# Patient Record
Sex: FEMALE | Race: WHITE | NOT HISPANIC OR LATINO | Employment: OTHER | URBAN - METROPOLITAN AREA
[De-identification: names, ages, dates, MRNs, and addresses within clinical notes are randomized per-mention and may not be internally consistent; named-entity substitution may affect disease eponyms.]

---

## 2017-01-04 PROBLEM — R42 DIZZINESS: Status: ACTIVE | Noted: 2017-01-04

## 2017-01-13 PROBLEM — K21.9 GASTROESOPHAGEAL REFLUX DISEASE WITHOUT ESOPHAGITIS: Status: ACTIVE | Noted: 2017-01-13

## 2017-03-03 ENCOUNTER — OFFICE VISIT (OUTPATIENT)
Dept: PAIN MEDICINE | Facility: CLINIC | Age: 72
End: 2017-03-03
Payer: MEDICARE

## 2017-03-03 VITALS — DIASTOLIC BLOOD PRESSURE: 71 MMHG | HEIGHT: 61 IN | SYSTOLIC BLOOD PRESSURE: 147 MMHG | HEART RATE: 63 BPM

## 2017-03-03 DIAGNOSIS — M51.36 DDD (DEGENERATIVE DISC DISEASE), LUMBAR: ICD-10-CM

## 2017-03-03 DIAGNOSIS — M51.36 DDD (DEGENERATIVE DISC DISEASE), LUMBAR: Primary | ICD-10-CM

## 2017-03-03 PROCEDURE — 99213 OFFICE O/P EST LOW 20 MIN: CPT | Mod: PBBFAC,PO | Performed by: PHYSICIAN ASSISTANT

## 2017-03-03 PROCEDURE — 99999 PR PBB SHADOW E&M-EST. PATIENT-LVL III: CPT | Mod: PBBFAC,,, | Performed by: PHYSICIAN ASSISTANT

## 2017-03-03 PROCEDURE — 99214 OFFICE O/P EST MOD 30 MIN: CPT | Mod: S$PBB,,, | Performed by: PHYSICIAN ASSISTANT

## 2017-03-03 RX ORDER — TRAMADOL HYDROCHLORIDE 50 MG/1
50 TABLET ORAL EVERY 8 HOURS PRN
Qty: 60 TABLET | Refills: 2 | Status: SHIPPED | OUTPATIENT
Start: 2017-03-28 | End: 2017-05-31 | Stop reason: SDUPTHER

## 2017-03-03 NOTE — MR AVS SNAPSHOT
[facility]  [address] [place] [address]  [address]  [place] [address]  Phone: [phone]                  Suki Macias   3/3/2017 11:00 AM   Office Visit    Description:  Female : 1945   Provider:  Reyna Becker PA-C   Department:  [facility]           Reason for Visit     Follow-up           Diagnoses this Visit        Comments    DDD (degenerative disc disease), lumbar    -  Primary            To Do List           Future Appointments        Provider Department Dept Phone    2017 11:00 AM MD [facility] [phone]      Goals (5 Years of Data)     None      [facility]     [facility] On [facility] Care Line -  Assistance  Registered nurses in the [facility] Center provide clinical advisement, health education, appointment booking, and other advisory services.  Call for this free service at [phone].             Medications           Message regarding Medications     Verify the changes and/or additions to your medication regime listed below are the same as discussed with your clinician today.  If any of these changes or additions are incorrect, please notify your healthcare provider.             Verify that the below list of medications is an accurate representation of the medications you are currently taking.  If none reported, the list may be blank. If incorrect, please contact your healthcare provider. Carry this list with you in case of emergency.           Current Medications     aspirin (ECOTRIN) 81 MG EC tablet Take 81 mg by mouth once daily.    celecoxib (CELEBREX) 200 MG capsule Take 1 capsule (200 mg total) by mouth 2 (two) times daily as needed for Pain.    estrogens, conjugated, (PREMARIN) 0.9 MG Tab Take 0.9 mg by mouth once daily.    fenofibric acid (FIBRICOR) 135 mg CpDR Take 1 capsule (135 mg total) by mouth once daily.    levothyroxine (SYNTHROID) 125 MCG tablet TAKE 1 TABLET EVERY MORNING ON AN EMPTY  "STOMACH FOR THYROID    lisinopril (PRINIVIL,ZESTRIL) 2.5 MG tablet Take 2.5 mg by mouth once daily.    omeprazole (PRILOSEC) 40 MG capsule Take 1 capsule (40 mg total) by mouth once daily.    pitavastatin (LIVALO) 2 mg Tab tablet Take 1 tablet (2 mg total) by mouth once daily.    tramadol (ULTRAM) 50 mg tablet Starting on Mar 28, 2017. Take 1 tablet (50 mg total) by mouth every 8 (eight) hours as needed for Pain.    azelastine (ASTELIN) 137 mcg (0.1 %) nasal spray 1 spray (137 mcg total) by Nasal route 2 (two) times daily.           Clinical Reference Information           Your Vitals Were     BP Pulse Height             147/71 (BP Location: Right arm, Patient Position: Sitting, BP Method: Automatic) 63 5' 1" (1.549 m)         Blood Pressure          Most Recent Value    BP  (!)  147/71      Allergies as of 3/3/2017     Codeine    Crestor [Rosuvastatin]    Lipitor [Atorvastatin]      Immunizations Administered on Date of Encounter - 3/3/2017     None      [facility] Sign-Up     Activating your [facility] account is as easy as 1-2-3!     1) Visit [email or web address], select Sign Up Now, enter this activation code and your date of birth, then select Next.  [email or web address]  Expires: 4/17/2017 11:02 AM      2) Create a username and password to use when you visit [facility] in the future and select a security question in case you lose your password and select Next.    3) Enter your e-mail address and click Sign Up!    Additional Information  If you have questions, please e-mail [email or web address] or call [phone] to talk to our [facility] staff. Remember, [facility] is NOT to be used for urgent needs. For medical emergencies, dial 911.         Language Assistance Services     ATTENTION: Language assistance services are available, free of charge. Please call [phone].      ATENCIÓN: Si habla español, tiene a ramesh disposición servicios gratuitos de asistencia lingüística. Llame al [phone].     ROGERS Ý: N?u b?n nói " Ti?ng Vi?t, có các d?ch v? h? tr? ngôn ng? mi?n phí dành cho b?n. G?i s? [phone].         [facility] complies with applicable Federal civil rights laws and does not discriminate on the basis of race, color, national origin, age, disability, or sex.

## 2017-03-03 NOTE — PROGRESS NOTES
Referring Physician: No ref. provider found    PCP: Sera Rodriguez III, MD      CC: Lower back pain    Interval History:  Ms. Macias is a 72 y.o. female with chronic low back pain who presents today for f/u and medication refills. Pain is unchanged in quality or location. Tramadol 50 mg q 12 h and Celebrex provides moderate benefit. She is able to perform her ADLs and play golf. She is happy with her pain control and does not desire any interventions. Pain today is rated 0/10.  Pt has been seen in the clinic before, however pt is new to me.     History below per Dr. Starr    HPI:   She is a 71-year-old female referred to us for lower back pain.  Is been present for over 10 years.  She states having constant aching pain in her lower back.  Pain radiates to her right hip at times.  Pain worsens with standing, walking and getting up.  She's had moderate benefit from physical therapy in the past.  She's also had lumbar BLAKE's in the past with moderate benefit.  Pain is currently tolerable with medications.  She takes tramadol 50 mg every 12 hours as needed.  She is able to perform her ADLs with the medication.  She also takes Celebrex with mild benefits.  Tramadol was provided by her PCP until recently.  She states her current PCP is unwilling to continue her tramadol even though he has prescribed to her for over 2 years.  She denies any weakness.  No bowel bladder changes.  She rates her pain 1/10 today.    ROS:  CONSTITUTIONAL: No fevers, chills, night sweats, wt. loss, appetite changes  SKIN: no rashes or itching  ENT: No headaches, head trauma, vision changes, or eye pain  LYMPH NODES: None noticed   CV: No chest pain, palpitations.   RESP: No shortness of breath, dyspnea on exertion, cough, wheezing, or hemoptysis  GI: No nausea, emesis, diarrhea, constipation, melena, hematochezia, pain.    : No dysuria, hematuria, urgency, or frequency   HEME: No easy bruising, bleeding problems  PSYCHIATRIC: No depression, anxiety,  "psychosis, hallucinations.  NEURO: No seizures, memory loss, dizziness or difficulty sleeping  MSK: + History of present illness      Past Medical History:   Diagnosis Date    Hypertension     Hypothyroid     Hypothyroidism, postablative      Past Surgical History:   Procedure Laterality Date    BACK SURGERY  1973    BLADDER SUSPENSION  1987    COLONOSCOPY  2011    procedure aborted. unable to complete    HYSTERECTOMY  1985    bso     Family History   Problem Relation Age of Onset    Colon cancer Mother     Lung cancer Father     Breast cancer Sister     Lung cancer Brother     Heart failure Brother     Stroke Brother      Social History     Social History    Marital status: Unknown     Spouse name: N/A    Number of children: N/A    Years of education: N/A     Social History Main Topics    Smoking status: Former Smoker     Quit date: 1/1/1970    Smokeless tobacco: None    Alcohol use None    Drug use: None    Sexual activity: Not Asked     Other Topics Concern    None     Social History Narrative         Medications/Allergies: See med card    Vitals:    03/03/17 1113   BP: (!) 147/71   Pulse: 63   Height: 5' 1" (1.549 m)   PainSc: 0-No pain         Physical exam:    GENERAL: A and O x3, the patient appears well groomed and is in no acute distress.  Skin: No rashes or obvious lesions  HEENT: normocephalic, atraumatic  CARDIOVASCULAR:  RRR  LUNGS: non labored breathing  ABDOMEN: soft, nontender   UPPER EXTREMITIES: Normal alignment, normal range of motion, no atrophy, no skin changes,  hair growth and nail growth normal and equal bilaterally. No swelling, no tenderness.    LOWER EXTREMITIES:  Normal alignment, normal range of motion, no atrophy, no skin changes,  hair growth and nail growth normal and equal bilaterally. No swelling, no tenderness.    LUMBAR SPINE  Lumbar spine: ROM is full with flexion extension and oblique extension with mild increased pain.    Andry's test causes no increased " pain on either side.    Supine straight leg raise is negative bilaterally.    Internal and external rotation of the hip causes no increased pain on either side.  Myofascial exam: No tenderness to palpation across lumbar paraspinous muscles.      MENTAL STATUS: normal orientation, speech, language, and fund of knowledge for social situation.  Emotional state appropriate.    CRANIAL NERVES:  II:  PERRL bilaterally,   III,IV,VI: EOMI.    V:  Facial sensation equal bilaterally  VII:  Facial motor function normal.  VIII:  Hearing equal to finger rub bilaterally  IX/X: Gag normal, palate symmetric  XI:  Shoulder shrug equal, head turn equal  XII:  Tongue midline without fasciculations      MOTOR: Tone and bulk: normal bilateral upper and lower Strength: normal   Delt Bi Tri WE WF     R 5 5 5 5 5 5   L 5 5 5 5 5 5     IP ADD ABD Quad TA Gas HAM  R 5 5 5 5 5 5 5  L 5 5 5 5 5 5 5    SENSATION: Light touch and pinprick intact bilaterally  REFLEXES: normal, symmetric, nonbrisk.  Toes down, no clonus. No hoffmans.  GAIT: normal rise, base, steps, and arm swing.        Imaging:  None available    Assessment:  Ms. Macias is a 72 y.o. female with low back pain  1. DDD (degenerative disc disease), lumbar        Plan:  1. I have stressed the importance of physical activity and exercise to improve overall health  2. Tramadol 50 mg twice a day as needed.  This is very reasonable.   reviewed.  No aberrant behavior.   3. She has had moderate benefit from lumbar BLAKE's in the past. Pain is currently tolerable. She does not desire interventions currently. May consider repeat procedure in future  4. F/u 3 months or sooner  All medication management was performed by Dr. Kashif Starr

## 2017-03-27 PROBLEM — R19.7 DIARRHEA: Status: ACTIVE | Noted: 2017-03-27

## 2017-05-31 DIAGNOSIS — M51.36 DDD (DEGENERATIVE DISC DISEASE), LUMBAR: ICD-10-CM

## 2017-05-31 RX ORDER — TRAMADOL HYDROCHLORIDE 50 MG/1
50 TABLET ORAL EVERY 8 HOURS PRN
Qty: 60 TABLET | Refills: 2 | Status: SHIPPED | OUTPATIENT
Start: 2017-06-05 | End: 2017-07-04

## 2017-06-05 ENCOUNTER — OFFICE VISIT (OUTPATIENT)
Dept: PAIN MEDICINE | Facility: CLINIC | Age: 72
End: 2017-06-05
Payer: MEDICARE

## 2017-06-05 VITALS
HEART RATE: 64 BPM | DIASTOLIC BLOOD PRESSURE: 69 MMHG | WEIGHT: 107 LBS | HEIGHT: 61 IN | SYSTOLIC BLOOD PRESSURE: 144 MMHG | BODY MASS INDEX: 20.2 KG/M2

## 2017-06-05 DIAGNOSIS — M51.36 DDD (DEGENERATIVE DISC DISEASE), LUMBAR: Primary | ICD-10-CM

## 2017-06-05 PROCEDURE — 99214 OFFICE O/P EST MOD 30 MIN: CPT | Mod: S$PBB,,, | Performed by: PHYSICIAN ASSISTANT

## 2017-06-05 PROCEDURE — 99999 PR PBB SHADOW E&M-EST. PATIENT-LVL III: CPT | Mod: PBBFAC,,, | Performed by: PHYSICIAN ASSISTANT

## 2017-06-05 PROCEDURE — 99213 OFFICE O/P EST LOW 20 MIN: CPT | Mod: PBBFAC,PO | Performed by: PHYSICIAN ASSISTANT

## 2017-06-05 PROCEDURE — 1125F AMNT PAIN NOTED PAIN PRSNT: CPT | Mod: ,,, | Performed by: PHYSICIAN ASSISTANT

## 2017-06-05 PROCEDURE — 1159F MED LIST DOCD IN RCRD: CPT | Mod: ,,, | Performed by: PHYSICIAN ASSISTANT

## 2017-06-05 RX ORDER — LEVOTHYROXINE SODIUM 125 UG/1
TABLET ORAL
COMMUNITY
Start: 2017-02-27 | End: 2017-08-31 | Stop reason: DRUGHIGH

## 2017-06-05 NOTE — PROGRESS NOTES
Referring Physician: No ref. provider found    PCP: Sera Rodriguez III, MD      CC: Lower back pain    Interval History:  Ms. Macias is a 72 y.o. female with chronic low back pain who presents today for f/u and medication refills. Pain is unchanged in quality or location. Tramadol 50 mg q 12 h and Celebrex provides moderate benefit. She is able to perform her ADLs and play golf. She is happy with her pain control and does not desire any interventions. Pain today is rated 4/10.    HPI:   She is a 71-year-old female referred to us for lower back pain.  Is been present for over 10 years.  She states having constant aching pain in her lower back.  Pain radiates to her right hip at times.  Pain worsens with standing, walking and getting up.  She's had moderate benefit from physical therapy in the past.  She's also had lumbar BLAKE's in the past with moderate benefit.  Pain is currently tolerable with medications.  She takes tramadol 50 mg every 12 hours as needed.  She is able to perform her ADLs with the medication.  She also takes Celebrex with mild benefits.  Tramadol was provided by her PCP until recently.  She states her current PCP is unwilling to continue her tramadol even though he has prescribed to her for over 2 years.  She denies any weakness.  No bowel bladder changes.  She rates her pain 1/10 today.    ROS:  CONSTITUTIONAL: No fevers, chills, night sweats, wt. loss, appetite changes  SKIN: no rashes or itching  ENT: No headaches, head trauma, vision changes, or eye pain  LYMPH NODES: None noticed   CV: No chest pain, palpitations.   RESP: No shortness of breath, dyspnea on exertion, cough, wheezing, or hemoptysis  GI: No nausea, emesis, diarrhea, constipation, melena, hematochezia, pain.    : No dysuria, hematuria, urgency, or frequency   HEME: No easy bruising, bleeding problems  PSYCHIATRIC: No depression, anxiety, psychosis, hallucinations.  NEURO: No seizures, memory loss, dizziness or difficulty  "sleeping  MSK: + History of present illness      Past Medical History:   Diagnosis Date    Hypertension     Hypothyroid     Hypothyroidism, postablative      Past Surgical History:   Procedure Laterality Date    BACK SURGERY  1973    BLADDER SUSPENSION  1987    COLONOSCOPY  2011    procedure aborted. unable to complete    HYSTERECTOMY  1985    bso     Family History   Problem Relation Age of Onset    Colon cancer Mother     Lung cancer Father     Breast cancer Sister     Lung cancer Brother     Heart failure Brother     Stroke Brother      Social History     Social History    Marital status: Unknown     Spouse name: N/A    Number of children: N/A    Years of education: N/A     Social History Main Topics    Smoking status: Former Smoker     Quit date: 1/1/1970    Smokeless tobacco: None    Alcohol use None    Drug use: Unknown    Sexual activity: Not Asked     Other Topics Concern    None     Social History Narrative    None         Medications/Allergies: See med card    Vitals:    06/05/17 1011   BP: (!) 144/69   Pulse: 64   Weight: 48.5 kg (107 lb)   Height: 5' 1" (1.549 m)   PainSc:   4   PainLoc: Back         Physical exam:    GENERAL: A and O x3, the patient appears well groomed and is in no acute distress.  Skin: No rashes or obvious lesions  HEENT: normocephalic, atraumatic  CARDIOVASCULAR:  RRR  LUNGS: non labored breathing  ABDOMEN: soft, nontender   UPPER EXTREMITIES: Normal alignment, normal range of motion, no atrophy, no skin changes,  hair growth and nail growth normal and equal bilaterally. No swelling, no tenderness.    LOWER EXTREMITIES:  Normal alignment, normal range of motion, no atrophy, no skin changes,  hair growth and nail growth normal and equal bilaterally. No swelling, no tenderness.    LUMBAR SPINE  Lumbar spine: ROM is full with flexion extension and oblique extension with mild increased pain.    Andry's test causes no increased pain on either side.    Supine " straight leg raise is negative bilaterally.    Internal and external rotation of the hip causes no increased pain on either side.  Myofascial exam: No tenderness to palpation across lumbar paraspinous muscles.      MENTAL STATUS: normal orientation, speech, language, and fund of knowledge for social situation.  Emotional state appropriate.    CRANIAL NERVES:  II:  PERRL bilaterally,   III,IV,VI: EOMI.    V:  Facial sensation equal bilaterally  VII:  Facial motor function normal.  VIII:  Hearing equal to finger rub bilaterally  IX/X: Gag normal, palate symmetric  XI:  Shoulder shrug equal, head turn equal  XII:  Tongue midline without fasciculations      MOTOR: Tone and bulk: normal bilateral upper and lower Strength: normal   Delt Bi Tri WE WF     R 5 5 5 5 5 5   L 5 5 5 5 5 5     IP ADD ABD Quad TA Gas HAM  R 5 5 5 5 5 5 5  L 5 5 5 5 5 5 5    SENSATION: Light touch and pinprick intact bilaterally  REFLEXES: normal, symmetric, nonbrisk.  Toes down, no clonus. No hoffmans.  GAIT: normal rise, base, steps, and arm swing.        Imaging:  None available    Assessment:  Ms. Macias is a 72 y.o. female with low back pain  1. DDD (degenerative disc disease), lumbar        Plan:  1. I have stressed the importance of physical activity and exercise to improve overall health  2. Tramadol 50 mg twice a day as needed.  2 refills.   reviewed.  No aberrant behavior.   3. She has had moderate benefit from lumbar BLAKE's in the past. Pain is currently tolerable. She does not desire interventions currently. May consider repeat procedure in future  4.  Follow up with PCP regarding elevated BP  5. F/u 3 months or sooner  All medication management was performed by Dr. Kashif Starr

## 2017-07-10 PROBLEM — R09.82 POSTNASAL DRIP: Status: ACTIVE | Noted: 2017-07-10

## 2017-07-10 PROBLEM — R05.9 COUGH: Status: ACTIVE | Noted: 2017-07-10

## 2017-07-10 PROBLEM — R06.7 SNEEZING: Status: ACTIVE | Noted: 2017-07-10

## 2017-07-10 PROBLEM — J34.89 RHINORRHEA: Status: ACTIVE | Noted: 2017-07-10

## 2017-07-10 PROBLEM — J02.9 SORE THROAT: Status: ACTIVE | Noted: 2017-07-10

## 2017-10-02 DIAGNOSIS — M51.36 DDD (DEGENERATIVE DISC DISEASE), LUMBAR: ICD-10-CM

## 2017-10-02 RX ORDER — TRAMADOL HYDROCHLORIDE 50 MG/1
50 TABLET ORAL EVERY 8 HOURS PRN
Qty: 60 TABLET | Refills: 2 | Status: CANCELLED | OUTPATIENT
Start: 2017-10-02 | End: 2017-10-31

## 2017-10-09 ENCOUNTER — OFFICE VISIT (OUTPATIENT)
Dept: PAIN MEDICINE | Facility: CLINIC | Age: 72
End: 2017-10-09
Payer: MEDICARE

## 2017-10-09 VITALS
HEIGHT: 61 IN | WEIGHT: 107 LBS | DIASTOLIC BLOOD PRESSURE: 74 MMHG | SYSTOLIC BLOOD PRESSURE: 171 MMHG | HEART RATE: 57 BPM | BODY MASS INDEX: 20.2 KG/M2

## 2017-10-09 DIAGNOSIS — M51.36 DDD (DEGENERATIVE DISC DISEASE), LUMBAR: Primary | ICD-10-CM

## 2017-10-09 DIAGNOSIS — M47.896 OTHER SPONDYLOSIS, LUMBAR REGION: ICD-10-CM

## 2017-10-09 PROCEDURE — 99213 OFFICE O/P EST LOW 20 MIN: CPT | Mod: PBBFAC,PO | Performed by: PHYSICIAN ASSISTANT

## 2017-10-09 PROCEDURE — 99214 OFFICE O/P EST MOD 30 MIN: CPT | Mod: S$PBB,,, | Performed by: PHYSICIAN ASSISTANT

## 2017-10-09 PROCEDURE — 99999 PR PBB SHADOW E&M-EST. PATIENT-LVL III: CPT | Mod: PBBFAC,,, | Performed by: PHYSICIAN ASSISTANT

## 2017-10-09 RX ORDER — TRAMADOL HYDROCHLORIDE 50 MG/1
TABLET ORAL
COMMUNITY
Start: 2017-10-02 | End: 2017-10-30 | Stop reason: SDUPTHER

## 2017-10-09 NOTE — PROGRESS NOTES
Referring Physician: No ref. provider found    PCP: Sera Rodriguez III, MD      CC: Lower back pain    Interval History:  Ms. Macias is a 72 y.o. female with chronic low back pain who presents today for f/u and medication refills. Pain is unchanged in quality or location. Tramadol 50 mg q 8 h and Celebrex provides moderate benefit. Sje reports waking up in pain. She is able to perform her ADLs and play golf. She is happy with her pain control and does not desire any interventions. Pain today is rated 6/10.    HPI:   She is a 71-year-old female referred to us for lower back pain.  Is been present for over 10 years.  She states having constant aching pain in her lower back.  Pain radiates to her right hip at times.  Pain worsens with standing, walking and getting up.  She's had moderate benefit from physical therapy in the past.  She's also had lumbar BLAKE's in the past with moderate benefit.  Pain is currently tolerable with medications.  She takes tramadol 50 mg every 12 hours as needed.  She is able to perform her ADLs with the medication.  She also takes Celebrex with mild benefits.  Tramadol was provided by her PCP until recently.  She states her current PCP is unwilling to continue her tramadol even though he has prescribed to her for over 2 years.  She denies any weakness.  No bowel bladder changes.  She rates her pain 1/10 today.    ROS:  CONSTITUTIONAL: No fevers, chills, night sweats, wt. loss, appetite changes  SKIN: no rashes or itching  ENT: No headaches, head trauma, vision changes, or eye pain  LYMPH NODES: None noticed   CV: No chest pain, palpitations.   RESP: No shortness of breath, dyspnea on exertion, cough, wheezing, or hemoptysis  GI: No nausea, emesis, diarrhea, constipation, melena, hematochezia, pain.    : No dysuria, hematuria, urgency, or frequency   HEME: No easy bruising, bleeding problems  PSYCHIATRIC: No depression, anxiety, psychosis, hallucinations.  NEURO: No seizures, memory loss,  "dizziness or difficulty sleeping  MSK: + History of present illness      Past Medical History:   Diagnosis Date    Hypertension     Hypothyroid     Hypothyroidism, postablative      Past Surgical History:   Procedure Laterality Date    BACK SURGERY  1973    BLADDER SUSPENSION  1987    COLONOSCOPY  2011    procedure aborted. unable to complete    HYSTERECTOMY  1985    bso     Family History   Problem Relation Age of Onset    Colon cancer Mother     Lung cancer Father     Breast cancer Sister     Lung cancer Brother     Heart failure Brother     Stroke Brother      Social History     Social History    Marital status: Unknown     Spouse name: N/A    Number of children: N/A    Years of education: N/A     Social History Main Topics    Smoking status: Former Smoker     Quit date: 1/1/1970    Smokeless tobacco: Former User    Alcohol use None    Drug use: Unknown    Sexual activity: Not Asked     Other Topics Concern    None     Social History Narrative    None         Medications/Allergies: See med card    Vitals:    10/09/17 1309   BP: (!) 171/74   Pulse: (!) 57   Weight: 48.5 kg (107 lb)   Height: 5' 1" (1.549 m)   PainSc:   6   PainLoc: Back         Physical exam:    GENERAL: A and O x3, the patient appears well groomed and is in no acute distress.  Skin: No rashes or obvious lesions  HEENT: normocephalic, atraumatic  CARDIOVASCULAR:  Bradycardia  LUNGS: non labored breathing  ABDOMEN: soft, nontender   UPPER EXTREMITIES: Normal alignment, normal range of motion, no atrophy, no skin changes,  hair growth and nail growth normal and equal bilaterally. No swelling, no tenderness.    LOWER EXTREMITIES:  Normal alignment, normal range of motion, no atrophy, no skin changes,  hair growth and nail growth normal and equal bilaterally. No swelling, no tenderness.    LUMBAR SPINE  Lumbar spine: ROM is full with flexion extension and oblique extension with mild increased pain.    Andry's test causes no " increased pain on either side.    Supine straight leg raise is negative bilaterally.    Internal and external rotation of the hip causes no increased pain on either side.  Myofascial exam: No tenderness to palpation across lumbar paraspinous muscles.      MENTAL STATUS: normal orientation, speech, language, and fund of knowledge for social situation.  Emotional state appropriate.    CRANIAL NERVES:  II:  PERRL bilaterally,   III,IV,VI: EOMI.    V:  Facial sensation equal bilaterally  VII:  Facial motor function normal.  VIII:  Hearing equal to finger rub bilaterally  IX/X: Gag normal, palate symmetric  XI:  Shoulder shrug equal, head turn equal  XII:  Tongue midline without fasciculations      MOTOR: Tone and bulk: normal bilateral upper and lower Strength: normal   Delt Bi Tri WE WF     R 5 5 5 5 5 5   L 5 5 5 5 5 5     IP ADD ABD Quad TA Gas HAM  R 5 5 5 5 5 5 5  L 5 5 5 5 5 5 5    SENSATION: Light touch and pinprick intact bilaterally  REFLEXES: normal, symmetric, nonbrisk.  Toes down, no clonus. No hoffmans.  GAIT: normal rise, base, steps, and arm swing.      Imaging:  None available    Assessment:  Ms. Macias is a 72 y.o. female with low back pain  1. DDD (degenerative disc disease), lumbar    2. Other spondylosis, lumbar region      Plan:  1. I have stressed the importance of physical activity and exercise to improve overall health  2. Increase to Tramadol 50 mg am and 100 mg nightly as needed. .   reviewed.  No aberrant behavior. Will call for prescription with 2 refills  3. She has had moderate benefit from lumbar BLAKE's in the past. Pain is currently tolerable. She does not desire interventions currently. May consider repeat procedure in future  4.  Follow up with PCP regarding elevated BP  5. F/u February  All medication management was performed by Dr. Kashif Starr

## 2017-10-30 RX ORDER — TRAMADOL HYDROCHLORIDE 50 MG/1
TABLET ORAL
Qty: 90 TABLET | Refills: 1 | Status: SHIPPED | OUTPATIENT
Start: 2017-10-30 | End: 2017-12-22 | Stop reason: SDUPTHER

## 2017-12-22 RX ORDER — TRAMADOL HYDROCHLORIDE 50 MG/1
TABLET ORAL
Qty: 90 TABLET | Refills: 1 | Status: SHIPPED | OUTPATIENT
Start: 2017-12-22 | End: 2018-02-14 | Stop reason: SDUPTHER

## 2018-01-19 PROBLEM — R06.7 SNEEZING: Status: RESOLVED | Noted: 2017-07-10 | Resolved: 2018-01-19

## 2018-01-19 PROBLEM — R05.9 COUGH: Status: RESOLVED | Noted: 2017-07-10 | Resolved: 2018-01-19

## 2018-01-19 PROBLEM — R09.82 POSTNASAL DRIP: Status: RESOLVED | Noted: 2017-07-10 | Resolved: 2018-01-19

## 2018-01-19 PROBLEM — J02.9 SORE THROAT: Status: RESOLVED | Noted: 2017-07-10 | Resolved: 2018-01-19

## 2018-01-19 PROBLEM — R19.7 DIARRHEA: Status: RESOLVED | Noted: 2017-03-27 | Resolved: 2018-01-19

## 2018-01-30 ENCOUNTER — OFFICE VISIT (OUTPATIENT)
Dept: DERMATOLOGY | Facility: CLINIC | Age: 73
End: 2018-01-30
Payer: MEDICARE

## 2018-01-30 VITALS — HEIGHT: 61 IN | BODY MASS INDEX: 20.39 KG/M2 | WEIGHT: 108 LBS

## 2018-01-30 DIAGNOSIS — L82.1 SEBORRHEIC KERATOSES: ICD-10-CM

## 2018-01-30 DIAGNOSIS — D22.9 MULTIPLE BENIGN NEVI: ICD-10-CM

## 2018-01-30 DIAGNOSIS — L23.2 ALLERGIC CONTACT DERMATITIS DUE TO COSMETICS: ICD-10-CM

## 2018-01-30 DIAGNOSIS — L81.4 SOLAR LENTIGO: ICD-10-CM

## 2018-01-30 DIAGNOSIS — D48.9 NEOPLASM OF UNCERTAIN BEHAVIOR: Primary | ICD-10-CM

## 2018-01-30 DIAGNOSIS — D18.01 CHERRY ANGIOMA: ICD-10-CM

## 2018-01-30 PROCEDURE — 11100 PR BIOPSY OF SKIN LESION: CPT | Mod: S$PBB,,, | Performed by: DERMATOLOGY

## 2018-01-30 PROCEDURE — 99213 OFFICE O/P EST LOW 20 MIN: CPT | Mod: PBBFAC,PO | Performed by: DERMATOLOGY

## 2018-01-30 PROCEDURE — 1126F AMNT PAIN NOTED NONE PRSNT: CPT | Mod: ,,, | Performed by: DERMATOLOGY

## 2018-01-30 PROCEDURE — 11100 PR BIOPSY OF SKIN LESION: CPT | Mod: PBBFAC,PO | Performed by: DERMATOLOGY

## 2018-01-30 PROCEDURE — 1159F MED LIST DOCD IN RCRD: CPT | Mod: ,,, | Performed by: DERMATOLOGY

## 2018-01-30 PROCEDURE — 88305 TISSUE EXAM BY PATHOLOGIST: CPT | Performed by: PATHOLOGY

## 2018-01-30 PROCEDURE — 99999 PR PBB SHADOW E&M-EST. PATIENT-LVL III: CPT | Mod: PBBFAC,,, | Performed by: DERMATOLOGY

## 2018-01-30 PROCEDURE — 99203 OFFICE O/P NEW LOW 30 MIN: CPT | Mod: 25,S$PBB,, | Performed by: DERMATOLOGY

## 2018-01-30 RX ORDER — AMOXICILLIN 500 MG
CAPSULE ORAL DAILY
COMMUNITY
End: 2020-12-10

## 2018-01-30 NOTE — PATIENT INSTRUCTIONS
Shave Biopsy Wound Care    Your doctor has performed a shave biopsy today.  A band aid and vaseline ointment has been placed over the site.  This should remain in place for 24 hours.  It is recommended that you keep the area dry for the first 24 hours.  After 24 hours, you may remove the band aid and wash the area with warm soap and water and apply Vaseline jelly.  Many patients prefer to use Neosporin or Bacitracin ointment.  This is acceptable; however, know that you can develop an allergy to this medication even if you have used it safely for years.  It is important to keep the area moist.  Letting it dry out and get air slows healing time, and will worsen the scar.  Band aid is optional after first 24 hours.      If you notice increasing redness, tenderness, pain, or yellow drainage at the biopsy site, please notify your doctor.  These are signs of an infection.    If your biopsy site is bleeding, apply firm pressure for 15 minutes straight.  Repeat for another 15 minutes, if it is still bleeding.   If the surgical site continues to bleed, then please contact your doctor.       [facility]  [facility]  [address] [place] [address]  Dept: [phone]

## 2018-01-30 NOTE — PROGRESS NOTES
"  Subjective:       Patient ID:  Suki Macias is a 73 y.o. 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   Skin: Positive for rash, activity-related sunscreen use and wears hat. Negative for daily sunscreen use.   Hematologic/Lymphatic: Bruises/bleeds easily.        Objective:    Physical Exam   Constitutional: She appears well-developed and well-nourished. No distress.   Neurological: She is alert and oriented to person, place, and time. She is not disoriented.   Psychiatric: She has a normal mood and affect.   Skin:   Areas Examined (abnormalities noted in diagram):   Scalp / Hair Palpated and Inspected  Head / Face Inspection Performed  Neck Inspection Performed  Chest / Axilla Inspection Performed  Abdomen Inspection Performed  Genitals / Buttocks / Groin Inspection Performed  Back Inspection Performed  RUE Inspected  LUE Inspection Performed  RLE Inspected  LLE Inspection Performed  Nails and Digits Inspection Performed                   Diagram Legend     Erythematous scaling macule/papule c/w actinic keratosis       Vascular papule c/w angioma      Pigmented verrucoid papule/plaque c/w seborrheic keratosis      Yellow umbilicated papule c/w sebaceous hyperplasia      Irregularly " shaped tan macule c/w lentigo     1-2 mm smooth white papules consistent with Milia      Movable subcutaneous cyst with punctum c/w epidermal inclusion cyst      Subcutaneous movable cyst c/w pilar cyst      Firm pink to brown papule c/w dermatofibroma      Pedunculated fleshy papule(s) c/w skin tag(s)      Evenly pigmented macule c/w junctional nevus     Mildly variegated pigmented, slightly irregular-bordered macule c/w mildly atypical nevus      Flesh colored to evenly pigmented papule c/w intradermal nevus       Pink pearly papule/plaque c/w basal cell carcinoma      Erythematous hyperkeratotic cursted plaque c/w SCC      Surgical scar with no sign of skin cancer recurrence      Open and closed comedones      Inflammatory papules and pustules      Verrucoid papule consistent consistent with wart     Erythematous eczematous patches and plaques     Dystrophic onycholytic nail with subungual debris c/w onychomycosis     Umbilicated papule    Erythematous-base heme-crusted tan verrucoid plaque consistent with inflamed seborrheic keratosis     Erythematous Silvery Scaling Plaque c/w Psoriasis     See annotation          Assessment / Plan:      Pathology Orders:     Normal Orders This Visit    Tissue Specimen To Pathology, Dermatology     Questions:    Directional Terms:  Other(comment)    Clinical information:  Hyperkeratotic papule, ISK vs SCC    Specific Site:  anterior scalp        Neoplasm of uncertain behavior  -     Tissue Specimen To Pathology, Dermatology  Shave biopsy procedure note:    Shave biopsy performed after verbal consent including risk of infection, scar, recurrence, need for additional treatment of site. Area prepped with alcohol, anesthetized with approximately 1.0cc of 1% lidocaine with epinephrine. Lesional tissue shaved with razor blade. Hemostasis achieved with application of aluminum chloride followed by hyfrecation. No complications. Dressing applied. Wound care explained.    Solar  lentigo  This is a benign hyperpigmented sun induced lesion. Daily sun protection will reduce the number of new lesions. Treatment of these benign lesions are considered cosmetic.    Seborrheic keratoses  These are benign inherited growths without a malignant potential. Reassurance given to patient. No treatment is necessary.     Cherry angioma  This is a benign vascular lesion. Reassurance given. No treatment required.     Multiple benign nevi  Monitor for new mole or moles that are becoming bigger, darker, irritated, or developing irregular borders.     Allergic contact dermatitis due to cosmetics  Stop boom new product  No marked eruption today,   vanicream moisturizer or lite lotion    Patient instructed in importance in daily sun protection of at least spf 30. Sun avoidance and topical protection discussed.   Recommend Elta MD (physician office) COTZ sensitive (available online) for daily use on face and neck.  Patient encouraged to wear hat for all outdoor exposure.   Also discussed sun protective clothing.             Follow-up in about 1 year (around 1/30/2019).

## 2018-02-14 RX ORDER — TRAMADOL HYDROCHLORIDE 50 MG/1
50 TABLET ORAL EVERY 8 HOURS PRN
Qty: 90 TABLET | Refills: 2 | Status: SHIPPED | OUTPATIENT
Start: 2018-02-14 | End: 2018-02-16 | Stop reason: SDUPTHER

## 2018-02-16 RX ORDER — TRAMADOL HYDROCHLORIDE 50 MG/1
50 TABLET ORAL EVERY 8 HOURS PRN
Qty: 90 TABLET | Refills: 2 | Status: SHIPPED | OUTPATIENT
Start: 2018-02-16 | End: 2018-05-14 | Stop reason: SDUPTHER

## 2018-02-19 ENCOUNTER — OFFICE VISIT (OUTPATIENT)
Dept: PAIN MEDICINE | Facility: CLINIC | Age: 73
End: 2018-02-19
Payer: MEDICARE

## 2018-02-19 VITALS
DIASTOLIC BLOOD PRESSURE: 71 MMHG | SYSTOLIC BLOOD PRESSURE: 149 MMHG | WEIGHT: 108 LBS | BODY MASS INDEX: 20.39 KG/M2 | HEIGHT: 61 IN | HEART RATE: 65 BPM

## 2018-02-19 DIAGNOSIS — M51.36 DDD (DEGENERATIVE DISC DISEASE), LUMBAR: Primary | ICD-10-CM

## 2018-02-19 DIAGNOSIS — M47.896 OTHER SPONDYLOSIS, LUMBAR REGION: ICD-10-CM

## 2018-02-19 PROCEDURE — 1125F AMNT PAIN NOTED PAIN PRSNT: CPT | Mod: ,,, | Performed by: PHYSICIAN ASSISTANT

## 2018-02-19 PROCEDURE — 99213 OFFICE O/P EST LOW 20 MIN: CPT | Mod: PBBFAC,PO | Performed by: PHYSICIAN ASSISTANT

## 2018-02-19 PROCEDURE — 1159F MED LIST DOCD IN RCRD: CPT | Mod: ,,, | Performed by: PHYSICIAN ASSISTANT

## 2018-02-19 PROCEDURE — 99999 PR PBB SHADOW E&M-EST. PATIENT-LVL III: CPT | Mod: PBBFAC,,, | Performed by: PHYSICIAN ASSISTANT

## 2018-02-19 PROCEDURE — 99214 OFFICE O/P EST MOD 30 MIN: CPT | Mod: S$PBB,,, | Performed by: PHYSICIAN ASSISTANT

## 2018-02-19 RX ORDER — IMIQUIMOD 12.5 MG/.25G
CREAM TOPICAL
COMMUNITY
Start: 2018-02-15 | End: 2018-11-19 | Stop reason: ALTCHOICE

## 2018-02-19 NOTE — PROGRESS NOTES
Referring Physician: No ref. provider found    PCP: Sera Rodriguez III, MD      CC: Lower back pain    Interval History:  Ms. Macias is a 73 y.o. female with chronic low back pain who presents today for f/u and medication refills. Pain is unchanged in quality or location. Tramadol 50 mg q 8 h and Celebrex provides moderate benefit. She reports waking up in pain. She is able to perform her ADLs and play golf. She is happy with her pain control and does not desire any interventions. Pain today is rated 4/10.    HPI:   She is a 71-year-old female referred to us for lower back pain.  Is been present for over 10 years.  She states having constant aching pain in her lower back.  Pain radiates to her right hip at times.  Pain worsens with standing, walking and getting up.  She's had moderate benefit from physical therapy in the past.  She's also had lumbar BLAKE's in the past with moderate benefit.  Pain is currently tolerable with medications.  She takes tramadol 50 mg every 12 hours as needed.  She is able to perform her ADLs with the medication.  She also takes Celebrex with mild benefits.  Tramadol was provided by her PCP until recently.  She states her current PCP is unwilling to continue her tramadol even though he has prescribed to her for over 2 years.  She denies any weakness.  No bowel bladder changes.  She rates her pain 1/10 today.    ROS:  CONSTITUTIONAL: No fevers, chills, night sweats, wt. loss, appetite changes  SKIN: no rashes or itching  ENT: No headaches, head trauma, vision changes, or eye pain  LYMPH NODES: None noticed   CV: No chest pain, palpitations.   RESP: No shortness of breath, dyspnea on exertion, cough, wheezing, or hemoptysis  GI: No nausea, emesis, diarrhea, constipation, melena, hematochezia, pain.    : No dysuria, hematuria, urgency, or frequency   HEME: No easy bruising, bleeding problems  PSYCHIATRIC: No depression, anxiety, psychosis, hallucinations.  NEURO: No seizures, memory loss,  "dizziness or difficulty sleeping  MSK: + History of present illness      Past Medical History:   Diagnosis Date    Hypertension     Hypothyroid     Hypothyroidism, postablative      Past Surgical History:   Procedure Laterality Date    BACK SURGERY  1973    BLADDER SUSPENSION  1987    COLONOSCOPY  2011    procedure aborted. unable to complete    HYSTERECTOMY  1985    bso     Family History   Problem Relation Age of Onset    Colon cancer Mother     Lung cancer Father     Breast cancer Sister     Lung cancer Brother     Heart failure Brother     Stroke Brother     Melanoma Neg Hx     Psoriasis Neg Hx     Lupus Neg Hx     Eczema Neg Hx      Social History     Social History    Marital status: Unknown     Spouse name: N/A    Number of children: N/A    Years of education: N/A     Social History Main Topics    Smoking status: Former Smoker     Quit date: 1/1/1970    Smokeless tobacco: Former User    Alcohol use None    Drug use: Unknown    Sexual activity: Not Asked     Other Topics Concern    None     Social History Narrative    None         Medications/Allergies: See med card    Vitals:    02/19/18 0957   BP: (!) 149/71   Pulse: 65   Weight: 49 kg (108 lb)   Height: 5' 1" (1.549 m)   PainSc:   4   PainLoc: Back         Physical exam:    GENERAL: A and O x3, the patient appears well groomed and is in no acute distress.  Skin: No rashes or obvious lesions  HEENT: normocephalic, atraumatic  CARDIOVASCULAR:  RRR  LUNGS: non labored breathing  ABDOMEN: soft, nontender   UPPER EXTREMITIES: Normal alignment, normal range of motion, no atrophy, no skin changes,  hair growth and nail growth normal and equal bilaterally. No swelling, no tenderness.    LOWER EXTREMITIES:  Normal alignment, normal range of motion, no atrophy, no skin changes,  hair growth and nail growth normal and equal bilaterally. No swelling, no tenderness.    LUMBAR SPINE  Lumbar spine: ROM is full with flexion extension and oblique " extension with mild increased pain.    Andry's test causes no increased pain on either side.    Supine straight leg raise is negative bilaterally.    Internal and external rotation of the hip causes no increased pain on either side.  Myofascial exam: No tenderness to palpation across lumbar paraspinous muscles.      MENTAL STATUS: normal orientation, speech, language, and fund of knowledge for social situation.  Emotional state appropriate.    CRANIAL NERVES:  II:  PERRL bilaterally,   III,IV,VI: EOMI.    V:  Facial sensation equal bilaterally  VII:  Facial motor function normal.  VIII:  Hearing equal to finger rub bilaterally  IX/X: Gag normal, palate symmetric  XI:  Shoulder shrug equal, head turn equal  XII:  Tongue midline without fasciculations      MOTOR: Tone and bulk: normal bilateral upper and lower Strength: normal   Delt Bi Tri WE WF     R 5 5 5 5 5 5   L 5 5 5 5 5 5     IP ADD ABD Quad TA Gas HAM  R 5 5 5 5 5 5 5  L 5 5 5 5 5 5 5    SENSATION: Light touch and pinprick intact bilaterally  REFLEXES: normal, symmetric, nonbrisk.  Toes down, no clonus. No hoffmans.  GAIT: normal rise, base, steps, and arm swing.      Imaging:  None available    Assessment:  Ms. Macias is a 73 y.o. female with low back pain  1. DDD (degenerative disc disease), lumbar    2. Other spondylosis, lumbar region      Plan:  1. I have stressed the importance of physical activity and exercise to improve overall health  2. Increase to Tramadol 50 mg am and 100 mg nightly as needed. .   reviewed.  No aberrant behavior.   3. She has had moderate benefit from lumbar BLAKE's in the past. Pain is currently tolerable. She does not desire interventions currently. May consider repeat procedure in future  4. F/u 3 months  All medication management was performed by Dr. Kashif Starr

## 2018-02-25 RX ORDER — TRAMADOL HYDROCHLORIDE 50 MG/1
TABLET ORAL
Qty: 90 TABLET | OUTPATIENT
Start: 2018-02-25

## 2018-03-19 ENCOUNTER — TELEPHONE (OUTPATIENT)
Dept: DERMATOLOGY | Facility: CLINIC | Age: 73
End: 2018-03-19

## 2018-03-19 NOTE — TELEPHONE ENCOUNTER
----- Message from Renetta Lucas sent at 3/19/2018 10:58 AM CDT -----  Contact: pt  Pt state the Dr gave her a cream and told her to follow up in 6 weeks which would be the end of march,nothing is coming up until after May...[phone]

## 2018-03-19 NOTE — TELEPHONE ENCOUNTER
Spoke to pt. Pt is upset because her 3 month f/u is scheduled 5/28/18, feels it should be sooner. Reminded pt I spoke with her on 2/15/2018. Discussed biopsy result and plan to treat with imiquimod cream for 6 weeks and f/u in 3 months. Pt verbalized understanding, at that time I scheduled her to come back 5/28/2018. Today pt is upset, states she has 2 weeks of treatment left and wanted to be seen once completed. Pt has a robust reaction as expected, informed Dr Car will need to assess once the site is healed and not inflamed. Pt states she will discuss with Dr Car when she comes in

## 2018-05-14 ENCOUNTER — OFFICE VISIT (OUTPATIENT)
Dept: PAIN MEDICINE | Facility: CLINIC | Age: 73
End: 2018-05-14
Payer: MEDICARE

## 2018-05-14 VITALS
WEIGHT: 108 LBS | HEART RATE: 64 BPM | HEIGHT: 61 IN | BODY MASS INDEX: 20.39 KG/M2 | DIASTOLIC BLOOD PRESSURE: 61 MMHG | SYSTOLIC BLOOD PRESSURE: 176 MMHG

## 2018-05-14 DIAGNOSIS — M47.896 OTHER SPONDYLOSIS, LUMBAR REGION: ICD-10-CM

## 2018-05-14 DIAGNOSIS — M51.36 DDD (DEGENERATIVE DISC DISEASE), LUMBAR: Primary | ICD-10-CM

## 2018-05-14 PROCEDURE — 99214 OFFICE O/P EST MOD 30 MIN: CPT | Mod: S$PBB,,, | Performed by: PHYSICIAN ASSISTANT

## 2018-05-14 PROCEDURE — 99999 PR PBB SHADOW E&M-EST. PATIENT-LVL III: CPT | Mod: PBBFAC,,, | Performed by: PHYSICIAN ASSISTANT

## 2018-05-14 PROCEDURE — 99213 OFFICE O/P EST LOW 20 MIN: CPT | Mod: PBBFAC,PN | Performed by: PHYSICIAN ASSISTANT

## 2018-05-14 RX ORDER — TRAMADOL HYDROCHLORIDE 50 MG/1
50 TABLET ORAL EVERY 8 HOURS PRN
Qty: 90 TABLET | Refills: 2 | Status: SHIPPED | OUTPATIENT
Start: 2018-06-07 | End: 2018-08-03 | Stop reason: ALTCHOICE

## 2018-05-14 RX ORDER — CELECOXIB 200 MG/1
CAPSULE ORAL
COMMUNITY
Start: 2018-03-27 | End: 2018-07-11 | Stop reason: SDUPTHER

## 2018-05-14 RX ORDER — ESTROGENS, CONJUGATED 0.9 MG/1
TABLET, FILM COATED ORAL
COMMUNITY
Start: 2018-03-27 | End: 2018-07-23 | Stop reason: SDUPTHER

## 2018-05-14 NOTE — PROGRESS NOTES
Referring Physician: No ref. provider found    PCP: Sera Rodriguez III, MD      CC: Lower back pain    Interval History:  Ms. Macias is a 73 y.o. female with chronic low back pain who presents today for f/u and medication refills. Pain is unchanged in quality or location. Tramadol 50 mg q 8 h and Celebrex provides moderate benefit. She reports waking up in pain. She is able to perform her ADLs and play golf. She is happy with her pain control and does not desire any interventions. Pain today is rated 6/10.    HPI:   She is a 71-year-old female referred to us for lower back pain.  Is been present for over 10 years.  She states having constant aching pain in her lower back.  Pain radiates to her right hip at times.  Pain worsens with standing, walking and getting up.  She's had moderate benefit from physical therapy in the past.  She's also had lumbar BLAKE's in the past with moderate benefit.  Pain is currently tolerable with medications.  She takes tramadol 50 mg every 12 hours as needed.  She is able to perform her ADLs with the medication.  She also takes Celebrex with mild benefits.  Tramadol was provided by her PCP until recently.  She states her current PCP is unwilling to continue her tramadol even though he has prescribed to her for over 2 years.  She denies any weakness.  No bowel bladder changes.  She rates her pain 1/10 today.    ROS:  CONSTITUTIONAL: No fevers, chills, night sweats, wt. loss, appetite changes  SKIN: no rashes or itching  ENT: No headaches, head trauma, vision changes, or eye pain  LYMPH NODES: None noticed   CV: No chest pain, palpitations.   RESP: No shortness of breath, dyspnea on exertion, cough, wheezing, or hemoptysis  GI: No nausea, emesis, diarrhea, constipation, melena, hematochezia, pain.    : No dysuria, hematuria, urgency, or frequency   HEME: No easy bruising, bleeding problems  PSYCHIATRIC: No depression, anxiety, psychosis, hallucinations.  NEURO: No seizures, memory loss,  "dizziness or difficulty sleeping  MSK: + History of present illness      Past Medical History:   Diagnosis Date    Hypertension     Hypothyroid     Hypothyroidism, postablative      Past Surgical History:   Procedure Laterality Date    BACK SURGERY  1973    BLADDER SUSPENSION  1987    COLONOSCOPY  2011    procedure aborted. unable to complete    HYSTERECTOMY  1985    bso     Family History   Problem Relation Age of Onset    Colon cancer Mother     Lung cancer Father     Breast cancer Sister     Lung cancer Brother     Heart failure Brother     Stroke Brother     Melanoma Neg Hx     Psoriasis Neg Hx     Lupus Neg Hx     Eczema Neg Hx      Social History     Social History    Marital status: Unknown     Spouse name: N/A    Number of children: N/A    Years of education: N/A     Social History Main Topics    Smoking status: Former Smoker     Quit date: 1/1/1970    Smokeless tobacco: Former User    Alcohol use None    Drug use: Unknown    Sexual activity: Not Asked     Other Topics Concern    None     Social History Narrative    None         Medications/Allergies: See med card    Vitals:    05/14/18 1010   BP: (!) 176/61   Pulse: 64   Weight: 49 kg (108 lb)   Height: 5' 1" (1.549 m)   PainSc:   6   PainLoc: Back         Physical exam:    GENERAL: A and O x3, the patient appears well groomed and is in no acute distress.  Skin: No rashes or obvious lesions  HEENT: normocephalic, atraumatic  CARDIOVASCULAR:  RRR  LUNGS: non labored breathing  ABDOMEN: soft, nontender   UPPER EXTREMITIES: Normal alignment, normal range of motion, no atrophy, no skin changes,  hair growth and nail growth normal and equal bilaterally. No swelling, no tenderness.    LOWER EXTREMITIES:  Normal alignment, normal range of motion, no atrophy, no skin changes,  hair growth and nail growth normal and equal bilaterally. No swelling, no tenderness.    LUMBAR SPINE  Lumbar spine: ROM is full with flexion extension and oblique " extension with mild increased pain.    Andry's test causes no increased pain on either side.    Supine straight leg raise is negative bilaterally.    Internal and external rotation of the hip causes no increased pain on either side.  Myofascial exam: No tenderness to palpation across lumbar paraspinous muscles.      MENTAL STATUS: normal orientation, speech, language, and fund of knowledge for social situation.  Emotional state appropriate.    CRANIAL NERVES:  II:  PERRL bilaterally,   III,IV,VI: EOMI.    V:  Facial sensation equal bilaterally  VII:  Facial motor function normal.  VIII:  Hearing equal to finger rub bilaterally  IX/X: Gag normal, palate symmetric  XI:  Shoulder shrug equal, head turn equal  XII:  Tongue midline without fasciculations      MOTOR: Tone and bulk: normal bilateral upper and lower Strength: normal   Delt Bi Tri WE WF     R 5 5 5 5 5 5   L 5 5 5 5 5 5     IP ADD ABD Quad TA Gas HAM  R 5 5 5 5 5 5 5  L 5 5 5 5 5 5 5    SENSATION: Light touch and pinprick intact bilaterally  REFLEXES: normal, symmetric, nonbrisk.  Toes down, no clonus. No hoffmans.  GAIT: normal rise, base, steps, and arm swing.      Imaging:  None available    Assessment:  Ms. Macias is a 73 y.o. female with low back pain  1. DDD (degenerative disc disease), lumbar    2. Other spondylosis, lumbar region      Plan:  1. I have stressed the importance of physical activity and exercise to improve overall health  2.  Tramadol 50 mg q 8 h prn.   reviewed.  No aberrant behavior.   3. She has had moderate benefit from lumbar BLAKE's in the past. Pain is currently tolerable. She does not desire interventions currently. May consider repeat procedure in future  4.  Follow up with PCP regarding elevated BP  5. F/u 3 months  All medication management was performed by Dr. Kashif Starr

## 2018-05-28 ENCOUNTER — OFFICE VISIT (OUTPATIENT)
Dept: DERMATOLOGY | Facility: CLINIC | Age: 73
End: 2018-05-28
Payer: MEDICARE

## 2018-05-28 VITALS — HEIGHT: 61 IN | BODY MASS INDEX: 20.39 KG/M2 | WEIGHT: 108 LBS

## 2018-05-28 DIAGNOSIS — D04.4 SQUAMOUS CELL CARCINOMA IN SITU (SCCIS) OF SCALP: Primary | ICD-10-CM

## 2018-05-28 PROCEDURE — 99999 PR PBB SHADOW E&M-EST. PATIENT-LVL III: CPT | Mod: PBBFAC,,, | Performed by: DERMATOLOGY

## 2018-05-28 PROCEDURE — 99213 OFFICE O/P EST LOW 20 MIN: CPT | Mod: S$PBB,,, | Performed by: DERMATOLOGY

## 2018-05-28 PROCEDURE — 99213 OFFICE O/P EST LOW 20 MIN: CPT | Mod: PBBFAC,PO | Performed by: DERMATOLOGY

## 2018-05-28 NOTE — PROGRESS NOTES
"  Subjective:       Patient ID:  Suki Macias is a 73 y.o. female who presents for   Chief Complaint   Patient presents with    Follow-up     chemo cream follow up on anterior scalp     Patient last seen 01/30/2018)  Lesion biopsied on scalp  FINAL PATHOLOGIC DIAGNOSIS  1. Skin, anterior scalp, shave biopsy:  - SQUAMOUS CELL CARCINOMA IN SITU.  - THE TUMOR EXTENDS TO THE LATERAL BIOPSY MARGINS.    Treated with imiquimod 5 times weekly for 6 weeks, with robust reaction (last applied mid march)  C/o hair loss at the site      No prior skin cancer, no lesion biopsied in the past, golfer, intense sun exposure  C/o skin "flaking" on face, changed makeup to "supposedly organic", boomstick mositurizer, color and glimmer  Acute rash the next morning, brought list of ingredients, wishes to know which one caused it            Review of Systems   Constitutional: Negative for fever, chills, weight loss, weight gain, fatigue, night sweats and malaise.   Skin: Positive for daily sunscreen use, activity-related sunscreen use and wears hat.   Hematologic/Lymphatic: Bruises/bleeds easily.        Objective:    Physical Exam   Constitutional: She appears well-developed and well-nourished. No distress.   Neurological: She is alert and oriented to person, place, and time. She is not disoriented.   Psychiatric: She has a normal mood and affect.   Skin:   Areas Examined (abnormalities noted in diagram):   Scalp / Hair Palpated and Inspected  Head / Face Inspection Performed                       Diagram Legend     Erythematous scaling macule/papule c/w actinic keratosis       Vascular papule c/w angioma      Pigmented verrucoid papule/plaque c/w seborrheic keratosis      Yellow umbilicated papule c/w sebaceous hyperplasia      Irregularly shaped tan macule c/w lentigo     1-2 mm smooth white papules consistent with Milia      Movable subcutaneous cyst with punctum c/w epidermal inclusion cyst      Subcutaneous movable cyst c/w " pilar cyst      Firm pink to brown papule c/w dermatofibroma      Pedunculated fleshy papule(s) c/w skin tag(s)      Evenly pigmented macule c/w junctional nevus     Mildly variegated pigmented, slightly irregular-bordered macule c/w mildly atypical nevus      Flesh colored to evenly pigmented papule c/w intradermal nevus       Pink pearly papule/plaque c/w basal cell carcinoma      Erythematous hyperkeratotic cursted plaque c/w SCC      Surgical scar with no sign of skin cancer recurrence      Open and closed comedones      Inflammatory papules and pustules      Verrucoid papule consistent consistent with wart     Erythematous eczematous patches and plaques     Dystrophic onycholytic nail with subungual debris c/w onychomycosis     Umbilicated papule    Erythematous-base heme-crusted tan verrucoid plaque consistent with inflamed seborrheic keratosis     Erythematous Silvery Scaling Plaque c/w Psoriasis     See annotation              Assessment / Plan:        Squamous cell carcinoma in situ (SCCIS) of scalp    post imiquimod 5 times weekly x 6 weeks  Area of alopecia bothersome to patient  Circular yet not AA on dermoscopy  Case reports of imiquimod induced TE   Area of daron thinning at the time of biopsy  rogaine 5% solution or foam           Follow-up in about 6 months (around 11/28/2018).

## 2018-08-01 ENCOUNTER — HOSPITAL ENCOUNTER (OUTPATIENT)
Dept: RADIOLOGY | Facility: HOSPITAL | Age: 73
Discharge: HOME | End: 2018-08-01
Attending: NURSE PRACTITIONER
Payer: MEDICARE

## 2018-08-01 VITALS — HEIGHT: 62 IN | WEIGHT: 108 LBS | BODY MASS INDEX: 19.88 KG/M2

## 2018-08-01 DIAGNOSIS — Z12.31 ENCOUNTER FOR SCREENING MAMMOGRAM FOR BREAST CANCER: ICD-10-CM

## 2018-08-01 PROCEDURE — 77067 SCR MAMMO BI INCL CAD: CPT | Mod: TC

## 2018-08-01 PROCEDURE — 77067 SCR MAMMO BI INCL CAD: CPT | Mod: 26,,, | Performed by: RADIOLOGY

## 2018-08-27 ENCOUNTER — OFFICE VISIT (OUTPATIENT)
Dept: PAIN MEDICINE | Facility: CLINIC | Age: 73
End: 2018-08-27
Payer: MEDICARE

## 2018-08-27 VITALS
BODY MASS INDEX: 20.39 KG/M2 | WEIGHT: 108 LBS | HEART RATE: 54 BPM | DIASTOLIC BLOOD PRESSURE: 77 MMHG | HEIGHT: 61 IN | SYSTOLIC BLOOD PRESSURE: 160 MMHG

## 2018-08-27 DIAGNOSIS — M47.896 OTHER SPONDYLOSIS, LUMBAR REGION: ICD-10-CM

## 2018-08-27 DIAGNOSIS — M51.36 DDD (DEGENERATIVE DISC DISEASE), LUMBAR: Primary | ICD-10-CM

## 2018-08-27 PROCEDURE — 99999 PR PBB SHADOW E&M-EST. PATIENT-LVL III: CPT | Mod: PBBFAC,,, | Performed by: PHYSICIAN ASSISTANT

## 2018-08-27 PROCEDURE — 99214 OFFICE O/P EST MOD 30 MIN: CPT | Mod: S$PBB,,, | Performed by: PHYSICIAN ASSISTANT

## 2018-08-27 PROCEDURE — 99213 OFFICE O/P EST LOW 20 MIN: CPT | Mod: PBBFAC,PN | Performed by: PHYSICIAN ASSISTANT

## 2018-08-27 RX ORDER — TRAMADOL HYDROCHLORIDE 50 MG/1
50 TABLET ORAL EVERY 8 HOURS PRN
Qty: 90 TABLET | Refills: 2 | Status: SHIPPED | OUTPATIENT
Start: 2018-08-27 | End: 2018-11-26 | Stop reason: SDUPTHER

## 2018-08-27 NOTE — PROGRESS NOTES
Referring Physician: No ref. provider found    PCP: Sera Rodriguez III, MD      CC: Lower back pain    Interval History:  Ms. Macias is a 73 y.o. female with chronic low back pain who presents today for f/u and medication refills. Pain is unchanged in quality or location. Tramadol 50 mg q 8 h and Celebrex provides moderate benefit. She reports waking up in pain. She is able to perform her ADLs and play golf. She is happy with her pain control and does not desire any interventions. Pain today is rated 5/10.    HPI:   She is a 71-year-old female referred to us for lower back pain.  Is been present for over 10 years.  She states having constant aching pain in her lower back.  Pain radiates to her right hip at times.  Pain worsens with standing, walking and getting up.  She's had moderate benefit from physical therapy in the past.  She's also had lumbar BLAKE's in the past with moderate benefit.  Pain is currently tolerable with medications.  She takes tramadol 50 mg every 12 hours as needed.  She is able to perform her ADLs with the medication.  She also takes Celebrex with mild benefits.  Tramadol was provided by her PCP until recently.  She states her current PCP is unwilling to continue her tramadol even though he has prescribed to her for over 2 years.  She denies any weakness.  No bowel bladder changes.  She rates her pain 1/10 today.    ROS:  CONSTITUTIONAL: No fevers, chills, night sweats, wt. loss, appetite changes  SKIN: no rashes or itching  ENT: No headaches, head trauma, vision changes, or eye pain  LYMPH NODES: None noticed   CV: No chest pain, palpitations.   RESP: No shortness of breath, dyspnea on exertion, cough, wheezing, or hemoptysis  GI: No nausea, emesis, diarrhea, constipation, melena, hematochezia, pain.    : No dysuria, hematuria, urgency, or frequency   HEME: No easy bruising, bleeding problems  PSYCHIATRIC: No depression, anxiety, psychosis, hallucinations.  NEURO: No seizures, memory loss,  "dizziness or difficulty sleeping  MSK: + History of present illness      Past Medical History:   Diagnosis Date    Hypertension     Hypothyroid     Hypothyroidism, postablative      Past Surgical History:   Procedure Laterality Date    BACK SURGERY      BLADDER SUSPENSION      COLONOSCOPY      procedure aborted. unable to complete    HYSTERECTOMY      bso    OOPHORECTOMY       Family History   Problem Relation Age of Onset    Colon cancer Mother     Lung cancer Father     Breast cancer Sister     Lung cancer Brother     Heart failure Brother     Stroke Brother     Breast cancer Sister     Melanoma Neg Hx     Psoriasis Neg Hx     Lupus Neg Hx     Eczema Neg Hx      Social History     Socioeconomic History    Marital status: Unknown     Spouse name: None    Number of children: None    Years of education: None    Highest education level: None   Social Needs    Financial resource strain: None    Food insecurity - worry: None    Food insecurity - inability: None    Transportation needs - medical: None    Transportation needs - non-medical: None   Occupational History    None   Tobacco Use    Smoking status: Former Smoker     Last attempt to quit: 1970     Years since quittin.6    Smokeless tobacco: Former User   Substance and Sexual Activity    Alcohol use: None    Drug use: None    Sexual activity: None   Other Topics Concern    Are you pregnant or think you may be? Not Asked    Breast-feeding Not Asked   Social History Narrative    None         Medications/Allergies: See med card    Vitals:    18 1029   BP: (!) 160/77   Pulse: (!) 54   Weight: 49 kg (108 lb)   Height: 5' 1" (1.549 m)   PainSc:   5   PainLoc: Back         Physical exam:    GENERAL: A and O x3, the patient appears well groomed and is in no acute distress.  Skin: No rashes or obvious lesions  HEENT: normocephalic, atraumatic  CARDIOVASCULAR:  bradycardia  LUNGS: non labored " breathing  ABDOMEN: soft, nontender   UPPER EXTREMITIES: Normal alignment, normal range of motion, no atrophy, no skin changes,  hair growth and nail growth normal and equal bilaterally. No swelling, no tenderness.    LOWER EXTREMITIES:  Normal alignment, normal range of motion, no atrophy, no skin changes,  hair growth and nail growth normal and equal bilaterally. No swelling, no tenderness.    LUMBAR SPINE  Lumbar spine: ROM is full with flexion extension and oblique extension with mild increased pain.    Andry's test causes no increased pain on either side.    Supine straight leg raise is negative bilaterally.    Internal and external rotation of the hip causes no increased pain on either side.  Myofascial exam: No tenderness to palpation across lumbar paraspinous muscles.      MENTAL STATUS: normal orientation, speech, language, and fund of knowledge for social situation.  Emotional state appropriate.    CRANIAL NERVES:  II:  PERRL bilaterally,   III,IV,VI: EOMI.    V:  Facial sensation equal bilaterally  VII:  Facial motor function normal.  VIII:  Hearing equal to finger rub bilaterally  IX/X: Gag normal, palate symmetric  XI:  Shoulder shrug equal, head turn equal  XII:  Tongue midline without fasciculations      MOTOR: Tone and bulk: normal bilateral upper and lower Strength: normal   Delt Bi Tri WE WF     R 5 5 5 5 5 5   L 5 5 5 5 5 5     IP ADD ABD Quad TA Gas HAM  R 5 5 5 5 5 5 5  L 5 5 5 5 5 5 5    SENSATION: Light touch and pinprick intact bilaterally  REFLEXES: normal, symmetric, nonbrisk.  Toes down, no clonus. No hoffmans.  GAIT: normal rise, base, steps, and arm swing.      Imaging:  None available    Assessment:  Ms. Macias is a 73 y.o. female with low back pain  1. DDD (degenerative disc disease), lumbar    2. Other spondylosis, lumbar region      Plan:  1. I have stressed the importance of physical activity and exercise to improve overall health  2.  Tramadol 50 mg q 8 h prn.   reviewed.   No aberrant behavior.   3. She has had moderate benefit from lumbar BLAKE's in the past. Pain is currently tolerable. She does not desire interventions currently. May consider repeat procedure in future  4. Follow up with PCP regarding elevated BP  5. F/u 3 months  All medication management was performed by Dr. Kashif Starr

## 2018-11-26 ENCOUNTER — OFFICE VISIT (OUTPATIENT)
Dept: PAIN MEDICINE | Facility: CLINIC | Age: 73
End: 2018-11-26
Payer: MEDICARE

## 2018-11-26 VITALS
BODY MASS INDEX: 20.58 KG/M2 | DIASTOLIC BLOOD PRESSURE: 73 MMHG | SYSTOLIC BLOOD PRESSURE: 154 MMHG | WEIGHT: 109 LBS | HEART RATE: 61 BPM | HEIGHT: 61 IN

## 2018-11-26 DIAGNOSIS — M51.36 DDD (DEGENERATIVE DISC DISEASE), LUMBAR: Primary | ICD-10-CM

## 2018-11-26 DIAGNOSIS — M47.896 OTHER SPONDYLOSIS, LUMBAR REGION: ICD-10-CM

## 2018-11-26 PROCEDURE — 99214 OFFICE O/P EST MOD 30 MIN: CPT | Mod: S$PBB,,, | Performed by: PHYSICIAN ASSISTANT

## 2018-11-26 PROCEDURE — 99214 OFFICE O/P EST MOD 30 MIN: CPT | Mod: PBBFAC,PN | Performed by: PHYSICIAN ASSISTANT

## 2018-11-26 PROCEDURE — 99999 PR PBB SHADOW E&M-EST. PATIENT-LVL IV: CPT | Mod: PBBFAC,,, | Performed by: PHYSICIAN ASSISTANT

## 2018-11-26 RX ORDER — TRAMADOL HYDROCHLORIDE 50 MG/1
50 TABLET ORAL EVERY 8 HOURS PRN
Qty: 90 TABLET | Refills: 2 | Status: SHIPPED | OUTPATIENT
Start: 2018-11-26 | End: 2018-12-25

## 2018-11-26 NOTE — PROGRESS NOTES
Referring Physician: No ref. provider found    PCP: Sera Rodriguez III, MD      CC: Lower back pain    Interval History:  Ms. Macias is a 73 y.o. female with chronic low back pain who presents today for f/u and medication refills. Pain is unchanged in quality or location. Tramadol 50 mg q 8 h and Celebrex provides moderate benefit. She reports waking up in pain. She is able to perform her ADLs and play golf. She is happy with her pain control and does not desire any interventions. Pain today is rated 6/10.    HPI:   She is a 71-year-old female referred to us for lower back pain.  Is been present for over 10 years.  She states having constant aching pain in her lower back.  Pain radiates to her right hip at times.  Pain worsens with standing, walking and getting up.  She's had moderate benefit from physical therapy in the past.  She's also had lumbar BLAKE's in the past with moderate benefit.  Pain is currently tolerable with medications.  She takes tramadol 50 mg every 12 hours as needed.  She is able to perform her ADLs with the medication.  She also takes Celebrex with mild benefits.  Tramadol was provided by her PCP until recently.  She states her current PCP is unwilling to continue her tramadol even though he has prescribed to her for over 2 years.  She denies any weakness.  No bowel bladder changes.  She rates her pain 1/10 today.    ROS:  CONSTITUTIONAL: No fevers, chills, night sweats, wt. loss, appetite changes  SKIN: no rashes or itching  ENT: No headaches, head trauma, vision changes, or eye pain  LYMPH NODES: None noticed   CV: No chest pain, palpitations.   RESP: No shortness of breath, dyspnea on exertion, cough, wheezing, or hemoptysis  GI: No nausea, emesis, diarrhea, constipation, melena, hematochezia, pain.    : No dysuria, hematuria, urgency, or frequency   HEME: No easy bruising, bleeding problems  PSYCHIATRIC: No depression, anxiety, psychosis, hallucinations.  NEURO: No seizures, memory loss,  "dizziness or difficulty sleeping  MSK: + History of present illness      Past Medical History:   Diagnosis Date    Hypertension     Hypothyroid     Hypothyroidism, postablative      Past Surgical History:   Procedure Laterality Date    BACK SURGERY      BLADDER SUSPENSION      COLONOSCOPY      procedure aborted. unable to complete    HYSTERECTOMY      bso    OOPHORECTOMY       Family History   Problem Relation Age of Onset    Colon cancer Mother     Lung cancer Father     Breast cancer Sister     Lung cancer Brother     Heart failure Brother     Stroke Brother     Breast cancer Sister     Melanoma Neg Hx     Psoriasis Neg Hx     Lupus Neg Hx     Eczema Neg Hx      Social History     Socioeconomic History    Marital status: Unknown     Spouse name: None    Number of children: None    Years of education: None    Highest education level: None   Social Needs    Financial resource strain: None    Food insecurity - worry: None    Food insecurity - inability: None    Transportation needs - medical: None    Transportation needs - non-medical: None   Occupational History    None   Tobacco Use    Smoking status: Former Smoker     Last attempt to quit: 1970     Years since quittin.9    Smokeless tobacco: Former User   Substance and Sexual Activity    Alcohol use: None    Drug use: None    Sexual activity: None   Other Topics Concern    Are you pregnant or think you may be? Not Asked    Breast-feeding Not Asked   Social History Narrative    None         Medications/Allergies: See med card    Vitals:    18 1040   BP: (!) 154/73   Pulse: 61   Weight: 49.4 kg (109 lb)   Height: 5' 1" (1.549 m)   PainSc:   6   PainLoc: Back         Physical exam:    GENERAL: A and O x3, the patient appears well groomed and is in no acute distress.  Skin: No rashes or obvious lesions  HEENT: normocephalic, atraumatic  CARDIOVASCULAR:  RRR  LUNGS: non labored breathing  ABDOMEN: " soft, nontender   UPPER EXTREMITIES: Normal alignment, normal range of motion, no atrophy, no skin changes,  hair growth and nail growth normal and equal bilaterally. No swelling, no tenderness.    LOWER EXTREMITIES:  Normal alignment, normal range of motion, no atrophy, no skin changes,  hair growth and nail growth normal and equal bilaterally. No swelling, no tenderness.    LUMBAR SPINE  Lumbar spine: ROM is full with flexion extension and oblique extension with mild increased pain.    Andry's test causes no increased pain on either side.    Supine straight leg raise is negative bilaterally.    Internal and external rotation of the hip causes no increased pain on either side.  Myofascial exam: No tenderness to palpation across lumbar paraspinous muscles.      MENTAL STATUS: normal orientation, speech, language, and fund of knowledge for social situation.  Emotional state appropriate.    CRANIAL NERVES:  II:  PERRL bilaterally,   III,IV,VI: EOMI.    V:  Facial sensation equal bilaterally  VII:  Facial motor function normal.  VIII:  Hearing equal to finger rub bilaterally  IX/X: Gag normal, palate symmetric  XI:  Shoulder shrug equal, head turn equal  XII:  Tongue midline without fasciculations      MOTOR: Tone and bulk: normal bilateral upper and lower Strength: normal   Delt Bi Tri WE WF     R 5 5 5 5 5 5   L 5 5 5 5 5 5     IP ADD ABD Quad TA Gas HAM  R 5 5 5 5 5 5 5  L 5 5 5 5 5 5 5    SENSATION: Light touch and pinprick intact bilaterally  REFLEXES: normal, symmetric, nonbrisk.  Toes down, no clonus. No hoffmans.  GAIT: normal rise, base, steps, and arm swing.      Imaging:  None available    Assessment:  Ms. Macias is a 73 y.o. female with low back pain  1. DDD (degenerative disc disease), lumbar    2. Other spondylosis, lumbar region      Plan:  1. I have stressed the importance of physical activity and exercise to improve overall health  2.  Tramadol 50 mg q 8 h prn.   reviewed.  No aberrant  behavior.   3. She has had moderate benefit from lumbar BLAKE's in the past. Pain is currently tolerable. She does not desire interventions currently. May consider repeat procedure in future  4. F/u 3 months  All medication management was performed by Dr. Kashif Starr

## 2018-12-03 ENCOUNTER — OFFICE VISIT (OUTPATIENT)
Dept: DERMATOLOGY | Facility: CLINIC | Age: 73
End: 2018-12-03
Payer: MEDICARE

## 2018-12-03 DIAGNOSIS — D22.9 MULTIPLE BENIGN NEVI: ICD-10-CM

## 2018-12-03 DIAGNOSIS — D18.01 CHERRY ANGIOMA: ICD-10-CM

## 2018-12-03 DIAGNOSIS — L82.1 SEBORRHEIC KERATOSES: ICD-10-CM

## 2018-12-03 DIAGNOSIS — L57.0 ACTINIC KERATOSES: Primary | ICD-10-CM

## 2018-12-03 DIAGNOSIS — Z85.828 HISTORY OF NONMELANOMA SKIN CANCER: ICD-10-CM

## 2018-12-03 DIAGNOSIS — L81.4 SOLAR LENTIGO: ICD-10-CM

## 2018-12-03 PROCEDURE — 17003 DESTRUCT PREMALG LES 2-14: CPT | Mod: S$PBB,,, | Performed by: DERMATOLOGY

## 2018-12-03 PROCEDURE — 17000 DESTRUCT PREMALG LESION: CPT | Mod: S$PBB,,, | Performed by: DERMATOLOGY

## 2018-12-03 PROCEDURE — 99999 PR PBB SHADOW E&M-EST. PATIENT-LVL II: CPT | Mod: PBBFAC,,, | Performed by: DERMATOLOGY

## 2018-12-03 PROCEDURE — 99212 OFFICE O/P EST SF 10 MIN: CPT | Mod: PBBFAC,PO | Performed by: DERMATOLOGY

## 2018-12-03 PROCEDURE — 17003 DESTRUCT PREMALG LES 2-14: CPT | Mod: PBBFAC,PO | Performed by: DERMATOLOGY

## 2018-12-03 PROCEDURE — 99213 OFFICE O/P EST LOW 20 MIN: CPT | Mod: 25,S$PBB,, | Performed by: DERMATOLOGY

## 2018-12-03 PROCEDURE — 17000 DESTRUCT PREMALG LESION: CPT | Mod: PBBFAC,PO | Performed by: DERMATOLOGY

## 2018-12-03 NOTE — PROGRESS NOTES
Subjective:       Patient ID:  Suki Macias is a 73 y.o. female who presents for   Chief Complaint   Patient presents with    Skin Check     FU, TBSE    Spot     Chest      Patient last seen 5/2018  H/o SCCIS scalp, s/p imiquimod with resultant patchy alopecia, improved since last visit  This is a high risk patient here to check for the development of new lesions.  Avid golfer, lifelong intense sun exposure        Spot  - Follow-up  Diagnosis: Spot.  Symptom course: unchanged  Affected locations: chest  Signs / symptoms: asymptomatic  Severity: mild        Review of Systems   Constitutional: Negative for fever, chills and fatigue.   Skin: Positive for daily sunscreen use, activity-related sunscreen use and wears hat.   Hematologic/Lymphatic: Bruises/bleeds easily.        Objective:    Physical Exam   Constitutional: She appears well-developed and well-nourished. No distress.   Neurological: She is alert and oriented to person, place, and time. She is not disoriented.   Psychiatric: She has a normal mood and affect.   Skin:   Areas Examined (abnormalities noted in diagram):   Scalp / Hair Palpated and Inspected  Head / Face Inspection Performed  Neck Inspection Performed  Chest / Axilla Inspection Performed  Abdomen Inspection Performed  Genitals / Buttocks / Groin Inspection Performed  Back Inspection Performed  RUE Inspected  LUE Inspection Performed  RLE Inspected  LLE Inspection Performed  Nails and Digits Inspection Performed                       Diagram Legend     Erythematous scaling macule/papule c/w actinic keratosis       Vascular papule c/w angioma      Pigmented verrucoid papule/plaque c/w seborrheic keratosis      Yellow umbilicated papule c/w sebaceous hyperplasia      Irregularly shaped tan macule c/w lentigo     1-2 mm smooth white papules consistent with Milia      Movable subcutaneous cyst with punctum c/w epidermal inclusion cyst      Subcutaneous movable cyst c/w pilar cyst      Firm  pink to brown papule c/w dermatofibroma      Pedunculated fleshy papule(s) c/w skin tag(s)      Evenly pigmented macule c/w junctional nevus     Mildly variegated pigmented, slightly irregular-bordered macule c/w mildly atypical nevus      Flesh colored to evenly pigmented papule c/w intradermal nevus       Pink pearly papule/plaque c/w basal cell carcinoma      Erythematous hyperkeratotic cursted plaque c/w SCC      Surgical scar with no sign of skin cancer recurrence      Open and closed comedones      Inflammatory papules and pustules      Verrucoid papule consistent consistent with wart     Erythematous eczematous patches and plaques     Dystrophic onycholytic nail with subungual debris c/w onychomycosis     Umbilicated papule    Erythematous-base heme-crusted tan verrucoid plaque consistent with inflamed seborrheic keratosis     Erythematous Silvery Scaling Plaque c/w Psoriasis     See annotation      Assessment / Plan:        Actinic keratoses, chest  Cryosurgery Procedure Note    Verbal consent from the patient is obtained and the patient is aware of the precancerous quality and need for treatment of these lesions. Liquid nitrogen cryosurgery is applied to the 2 actinic keratoses, as detailed in the physical exam, to produce a freeze injury. The patient is aware that blisters may form and is instructed on wound care with gentle cleansing and use of vaseline ointment to keep moist until healed. The patient is supplied a handout on cryosurgery and is instructed to call if lesions do not completely resolve.    Seborrheic keratoses  These are benign inherited growths without a malignant potential. Reassurance given to patient. No treatment is necessary.     History of nonmelanoma skin cancer  Area of previous SCCIS (ant scalp) examined. Site well healed with no signs of recurrence.  Total body skin examination performed today including at least 12 points as noted in physical examination. No lesions suspicious for  malignancy noted.    Solar lentigo  This is a benign hyperpigmented sun induced lesion. Daily sun protection will reduce the number of new lesions. Treatment of these benign lesions are considered cosmetic.    Cherry angioma  This is a benign vascular lesion. Reassurance given. No treatment required.     Multiple benign nevi  Monitor for new mole or moles that are becoming bigger, darker, irritated, or developing irregular borders.     Patient instructed in importance in daily sun protection of at least spf 30. Mineral sunscreen ingredients preferred (Zinc +/- Titanium).   Recommend Elta MD for daily use on face and neck.  Patient encouraged to wear hat for all outdoor exposure.   Also discussed sun avoidance and use of protective clothing.               Follow-up in about 1 year (around 12/3/2019), or if symptoms worsen or fail to improve.

## 2018-12-03 NOTE — PATIENT INSTRUCTIONS

## 2019-01-22 ENCOUNTER — HOSPITAL ENCOUNTER (OUTPATIENT)
Dept: RADIOLOGY | Facility: HOSPITAL | Age: 74
Discharge: HOME | End: 2019-01-22
Attending: INTERNAL MEDICINE
Payer: MEDICARE

## 2019-01-22 ENCOUNTER — OFFICE VISIT (OUTPATIENT)
Dept: RHEUMATOLOGY | Facility: CLINIC | Age: 74
End: 2019-01-22
Payer: MEDICARE

## 2019-01-22 VITALS
BODY MASS INDEX: 20.19 KG/M2 | DIASTOLIC BLOOD PRESSURE: 74 MMHG | SYSTOLIC BLOOD PRESSURE: 151 MMHG | HEIGHT: 61 IN | WEIGHT: 106.94 LBS | HEART RATE: 72 BPM

## 2019-01-22 DIAGNOSIS — M25.50 POLYARTHRALGIA: Primary | ICD-10-CM

## 2019-01-22 DIAGNOSIS — M15.1 HEBERDEN'S NODES: ICD-10-CM

## 2019-01-22 DIAGNOSIS — M25.542 ARTHRALGIA OF BOTH HANDS: ICD-10-CM

## 2019-01-22 DIAGNOSIS — M25.541 ARTHRALGIA OF BOTH HANDS: ICD-10-CM

## 2019-01-22 DIAGNOSIS — M25.50 POLYARTHRALGIA: ICD-10-CM

## 2019-01-22 DIAGNOSIS — M79.10 MYALGIA: ICD-10-CM

## 2019-01-22 DIAGNOSIS — M35.00 SICCA COMPLEX: ICD-10-CM

## 2019-01-22 DIAGNOSIS — R53.83 FATIGUE, UNSPECIFIED TYPE: ICD-10-CM

## 2019-01-22 DIAGNOSIS — M15.2 BOUCHARD'S NODES: ICD-10-CM

## 2019-01-22 PROCEDURE — 99204 OFFICE O/P NEW MOD 45 MIN: CPT | Mod: S$PBB,,, | Performed by: INTERNAL MEDICINE

## 2019-01-22 PROCEDURE — 73130 X-RAY EXAM OF HAND: CPT | Mod: 26,RT,, | Performed by: RADIOLOGY

## 2019-01-22 PROCEDURE — 99999 PR PBB SHADOW E&M-EST. PATIENT-LVL III: CPT | Mod: PBBFAC,,, | Performed by: INTERNAL MEDICINE

## 2019-01-22 PROCEDURE — 73130 X-RAY EXAM OF HAND: CPT | Mod: 50,TC,FY

## 2019-01-22 PROCEDURE — 99999 PR PBB SHADOW E&M-EST. PATIENT-LVL III: ICD-10-PCS | Mod: PBBFAC,,, | Performed by: INTERNAL MEDICINE

## 2019-01-22 PROCEDURE — 99213 OFFICE O/P EST LOW 20 MIN: CPT | Mod: PBBFAC,25,PO | Performed by: INTERNAL MEDICINE

## 2019-01-22 PROCEDURE — 99204 PR OFFICE/OUTPT VISIT, NEW, LEVL IV, 45-59 MIN: ICD-10-PCS | Mod: S$PBB,,, | Performed by: INTERNAL MEDICINE

## 2019-01-22 PROCEDURE — 73130 X-RAY EXAM OF HAND: CPT | Mod: 26,59,LT, | Performed by: RADIOLOGY

## 2019-01-22 PROCEDURE — 73130 PR  X-RAY HAND 3+ VW: ICD-10-PCS | Mod: 26,59,LT, | Performed by: RADIOLOGY

## 2019-01-22 RX ORDER — TRAMADOL HYDROCHLORIDE 50 MG/1
TABLET ORAL
COMMUNITY
Start: 2019-01-16 | End: 2019-03-20 | Stop reason: SDUPTHER

## 2019-01-22 ASSESSMENT — ROUTINE ASSESSMENT OF PATIENT INDEX DATA (RAPID3)
TOTAL RAPID3 SCORE: 4.17
MDHAQ FUNCTION SCORE: .3
FATIGUE SCORE: 5
WHEN YOU AWAKENED IN THE MORNING OVER THE LAST WEEK, PLEASE INDICATE THE AMOUNT OF TIME IT TAKES UNTIL YOU ARE AS LIMBER AS YOU WILL BE FOR THE DAY: ALL DAY
PSYCHOLOGICAL DISTRESS SCORE: 0
PAIN SCORE: 5
AM STIFFNESS SCORE: 1, YES
PATIENT GLOBAL ASSESSMENT SCORE: 6.5

## 2019-01-22 NOTE — PROGRESS NOTES
"Subjective:       Patient ID: Suki Macias is a 74 y.o. female.    Chief Complaint: Polyarthralgia   HPI 73 yo female with hypothyroidism and HTN is seen in consultation for joint pain. Patient complains of polyarthralgia for the last 10 years.  Hands hurt the most. Pain is aching type, continuous,  worse as the day progresses, worse with activity, better with rest.  Denies any joint effusion.  Early morning stiffness lasts for less than 1hr.  +SICCA symptoms   She denies fever, weight loss,  photosensitivity, rash, ulcers, Raynaud's phenomenon, alopecia, dysphagia, diarrhea or blood in the stools    Reviewed PMH, FH, SH and past labs      Review of Systems   Constitutional: Positive for fatigue. Negative for fever.   HENT: Negative for ear discharge and ear pain.    Eyes: Negative for pain and redness.   Respiratory: Negative for cough and shortness of breath.    Cardiovascular: Negative for chest pain and palpitations.   Gastrointestinal: Negative for abdominal distention and abdominal pain.   Genitourinary: Negative for genital sores and hematuria.   Musculoskeletal: Positive for arthralgias and myalgias.   Skin: Negative for color change and wound.   Neurological: Negative for tremors and seizures.   Psychiatric/Behavioral: Negative for agitation and hallucinations.         Objective:   BP (!) 151/74 (BP Location: Left arm, Patient Position: Sitting)   Pulse 72   Ht 5' 1" (1.549 m)   Wt 48.5 kg (106 lb 14.8 oz)   BMI 20.20 kg/m²      Physical Exam   Nursing note and vitals reviewed.  Constitutional: She is oriented to person, place, and time and well-developed, well-nourished, and in no distress.   HENT:   Head: Normocephalic and atraumatic.   Eyes: Conjunctivae and EOM are normal. Pupils are equal, round, and reactive to light.   Neck: Normal range of motion. Neck supple. No thyromegaly present.   Cardiovascular: Normal rate and regular rhythm.  Exam reveals no friction rub.    Pulmonary/Chest: Effort " normal and breath sounds normal.   Abdominal: Soft. Bowel sounds are normal. She exhibits no mass.   Neurological: She is alert and oriented to person, place, and time. She exhibits normal muscle tone.   Skin: Skin is warm and dry.     Psychiatric: Memory and affect normal.   Musculoskeletal: She exhibits no edema.   Neck with decreased range of motion in all directions   Bilateral shoulders with intact ROM   Elbows without any swelling or tenderness  Bilateral first CMC tenderness  Diffused Krystal's and Heberden's nodes  Bilateral hips with external rotation 25° and  internal rotation 15°   Bilateral knee crepitus  Both ankles and feet nontender, without synovitis           Lab Results   Component Value Date    WBC 7.3 10/29/2018    HGB 12.8 10/29/2018    HCT 38.8 10/29/2018    MCV 93.3 10/29/2018     10/29/2018     CMP  Sodium   Date Value Ref Range Status   10/29/2018 139 135 - 146 mmol/L Final     Potassium   Date Value Ref Range Status   10/29/2018 4.6 3.5 - 5.3 mmol/L Final     Chloride   Date Value Ref Range Status   10/29/2018 103 98 - 110 mmol/L Final     CO2   Date Value Ref Range Status   10/29/2018 27 20 - 32 mmol/L Final     Glucose   Date Value Ref Range Status   10/29/2018 157 (H) 65 - 99 mg/dL Final     Comment:                   Fasting reference interval     For someone without known diabetes, a glucose  value >125 mg/dL indicates that they may have  diabetes and this should be confirmed with a  follow-up test.          BUN, Bld   Date Value Ref Range Status   10/29/2018 30 (H) 7 - 25 mg/dL Final     Creatinine   Date Value Ref Range Status   10/29/2018 1.01 (H) 0.60 - 0.93 mg/dL Final     Comment:     For patients >49 years of age, the reference limit  for Creatinine is approximately 13% higher for people  identified as -American.          Calcium   Date Value Ref Range Status   10/29/2018 11.2 (H) 8.6 - 10.4 mg/dL Final     Total Protein   Date Value Ref Range Status   10/29/2018  6.2 6.1 - 8.1 g/dL Final     Albumin   Date Value Ref Range Status   10/29/2018 4.0 3.6 - 5.1 g/dL Final     Total Bilirubin   Date Value Ref Range Status   10/29/2018 0.4 0.2 - 1.2 mg/dL Final     Alkaline Phosphatase   Date Value Ref Range Status   10/29/2018 48 33 - 130 U/L Final     AST   Date Value Ref Range Status   10/29/2018 16 10 - 35 U/L Final     ALT   Date Value Ref Range Status   10/29/2018 10 6 - 29 U/L Final     eGFR if    Date Value Ref Range Status   10/29/2018 64 > OR = 60 mL/min/1.73m2 Final     eGFR if non    Date Value Ref Range Status   10/29/2018 55 (L) > OR = 60 mL/min/1.73m2 Final     Lab Results   Component Value Date    SEDRATE 1 07/23/2018     Lab Results   Component Value Date    CRP 3.3 07/23/2018        Results for SUSAN MICHELLE (MRN [id number]) as of 1/22/2019 09:55   Ref. 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

## 2019-01-22 NOTE — LETTER
January 25, 2019      Kashif Starr MD  [address] [place] [address] [address]  [address]  [place] [address]           [place] [facility]  [facility] [facility]  [place] [address]  Phone: [phone]  Fax: [phone]          Patient: Suki Macias   MR Number: [id number]   YOB: 1945   Date of Visit: 1/22/2019       Dear Dr. Kashif Starr:    Thank you for referring Suki Macias to me for evaluation. Attached you will find relevant portions of my assessment and plan of care.    If you have questions, please do not hesitate to call me. I look forward to following Suki Macias along with you.    Sincerely,    uRby Krueger MD    Enclosure  CC:  No Recipients    If you would like to receive this communication electronically, please contact [email or web address] or [phone] to request more information on [employer] access.    For providers and/or their staff who would like to refer a patient to [facility], please contact us through our one-stop-shop provider referral line, [facility], at [phone].    If you feel you have received this communication in error or would no longer like to receive these types of communications, please e-mail [email or web address]

## 2019-01-23 ENCOUNTER — TELEPHONE (OUTPATIENT)
Dept: RHEUMATOLOGY | Facility: CLINIC | Age: 74
End: 2019-01-23

## 2019-01-28 ENCOUNTER — HOSPITAL ENCOUNTER (OUTPATIENT)
Dept: RADIOLOGY | Facility: HOSPITAL | Age: 74
Discharge: HOME | End: 2019-01-28
Attending: NURSE PRACTITIONER
Payer: MEDICARE

## 2019-01-28 DIAGNOSIS — R19.7 DIARRHEA, UNSPECIFIED TYPE: ICD-10-CM

## 2019-01-28 DIAGNOSIS — R68.83 CHILLS: ICD-10-CM

## 2019-01-28 DIAGNOSIS — R10.33 PERIUMBILICAL ABDOMINAL PAIN: ICD-10-CM

## 2019-01-28 DIAGNOSIS — R53.83 FATIGUE, UNSPECIFIED TYPE: ICD-10-CM

## 2019-01-28 DIAGNOSIS — Z87.19 HISTORY OF COLITIS: ICD-10-CM

## 2019-01-28 PROCEDURE — 25500020 PHARM REV CODE 255: Performed by: NURSE PRACTITIONER

## 2019-01-28 PROCEDURE — 74177 CT ABD & PELVIS W/CONTRAST: CPT | Mod: 26,,, | Performed by: RADIOLOGY

## 2019-01-28 PROCEDURE — 74177 CT ABD & PELVIS W/CONTRAST: CPT | Mod: TC

## 2019-01-28 PROCEDURE — 74177 CT ABDOMEN PELVIS WITH CONTRAST: ICD-10-PCS | Mod: 26,,, | Performed by: RADIOLOGY

## 2019-01-28 RX ADMIN — IOHEXOL 30 ML: 300 INJECTION, SOLUTION INTRAVENOUS at 09:01

## 2019-01-28 RX ADMIN — IOHEXOL 50 ML: 350 INJECTION, SOLUTION INTRAVENOUS at 11:01

## 2019-02-18 ENCOUNTER — OFFICE VISIT (OUTPATIENT)
Dept: PAIN MEDICINE | Facility: CLINIC | Age: 74
End: 2019-02-18
Payer: MEDICARE

## 2019-02-18 VITALS
SYSTOLIC BLOOD PRESSURE: 128 MMHG | DIASTOLIC BLOOD PRESSURE: 65 MMHG | HEIGHT: 61 IN | BODY MASS INDEX: 20.01 KG/M2 | HEART RATE: 70 BPM | WEIGHT: 106 LBS

## 2019-02-18 DIAGNOSIS — M47.896 OTHER SPONDYLOSIS, LUMBAR REGION: ICD-10-CM

## 2019-02-18 DIAGNOSIS — M51.36 DDD (DEGENERATIVE DISC DISEASE), LUMBAR: Primary | ICD-10-CM

## 2019-02-18 PROCEDURE — 99213 OFFICE O/P EST LOW 20 MIN: CPT | Mod: PBBFAC,PN | Performed by: PHYSICIAN ASSISTANT

## 2019-02-18 PROCEDURE — 99214 PR OFFICE/OUTPT VISIT, EST, LEVL IV, 30-39 MIN: ICD-10-PCS | Mod: S$PBB,,, | Performed by: PHYSICIAN ASSISTANT

## 2019-02-18 PROCEDURE — 99214 OFFICE O/P EST MOD 30 MIN: CPT | Mod: S$PBB,,, | Performed by: PHYSICIAN ASSISTANT

## 2019-02-18 PROCEDURE — 99999 PR PBB SHADOW E&M-EST. PATIENT-LVL III: CPT | Mod: PBBFAC,,, | Performed by: PHYSICIAN ASSISTANT

## 2019-02-18 PROCEDURE — 99999 PR PBB SHADOW E&M-EST. PATIENT-LVL III: ICD-10-PCS | Mod: PBBFAC,,, | Performed by: PHYSICIAN ASSISTANT

## 2019-02-18 RX ORDER — DICLOFENAC SODIUM 10 MG/G
2 GEL TOPICAL DAILY
Qty: 1 TUBE | Refills: 5 | Status: SHIPPED | OUTPATIENT
Start: 2019-02-18 | End: 2019-12-13

## 2019-02-18 RX ORDER — TRAMADOL HYDROCHLORIDE 50 MG/1
50 TABLET ORAL EVERY 8 HOURS PRN
Qty: 90 TABLET | Refills: 1 | Status: SHIPPED | OUTPATIENT
Start: 2019-02-18 | End: 2019-03-20

## 2019-02-18 NOTE — PROGRESS NOTES
Referring Physician: No ref. provider found    PCP: Sera Rodriguez III, MD      CC: Lower back pain    Interval History:  Ms. Macias is a 74 y.o. female with chronic low back pain who presents today for f/u and medication refills. Pain is unchanged in quality or location. Tramadol 50 mg q 8 h and Celebrex provides moderate benefit. She reports waking up in pain. She is able to perform her ADLs and play golf. She is happy with her pain control and does not desire any interventions. Pain today is rated 6/10.    HPI:   She is a 71-year-old female referred to us for lower back pain.  Is been present for over 10 years.  She states having constant aching pain in her lower back.  Pain radiates to her right hip at times.  Pain worsens with standing, walking and getting up.  She's had moderate benefit from physical therapy in the past.  She's also had lumbar BLAKE's in the past with moderate benefit.  Pain is currently tolerable with medications.  She takes tramadol 50 mg every 12 hours as needed.  She is able to perform her ADLs with the medication.  She also takes Celebrex with mild benefits.  Tramadol was provided by her PCP until recently.  She states her current PCP is unwilling to continue her tramadol even though he has prescribed to her for over 2 years.  She denies any weakness.  No bowel bladder changes.  She rates her pain 1/10 today.    ROS:  CONSTITUTIONAL: No fevers, chills, night sweats, wt. loss, appetite changes  SKIN: no rashes or itching  ENT: No headaches, head trauma, vision changes, or eye pain  LYMPH NODES: None noticed   CV: No chest pain, palpitations.   RESP: No shortness of breath, dyspnea on exertion, cough, wheezing, or hemoptysis  GI: No nausea, emesis, diarrhea, constipation, melena, hematochezia, pain.    : No dysuria, hematuria, urgency, or frequency   HEME: No easy bruising, bleeding problems  PSYCHIATRIC: No depression, anxiety, psychosis, hallucinations.  NEURO: No seizures, memory loss,  dizziness or difficulty sleeping  MSK: + History of present illness      Past Medical History:   Diagnosis Date    Hypertension     Hypothyroid     Hypothyroidism, postablative     Squamous cell carcinoma 01/2018    SCCIS anterior scalp, imiquimod     Past Surgical History:   Procedure Laterality Date    BACK SURGERY  1973    BLADDER SUSPENSION  1987    COLONOSCOPY  2011    procedure aborted. unable to complete    HYSTERECTOMY  1985    bso    OOPHORECTOMY      SPINE SURGERY       Family History   Problem Relation Age of Onset    Colon cancer Mother     Thyroid disease Mother     Cancer Mother     Diabetes Mellitus Mother     Kidney disease Mother     Hypertension Mother     Heart disease Mother     Lung cancer Father     Rheum arthritis Father     Cancer Father     Breast cancer Sister     Hypertension Sister     Hyperlipidemia Sister     Lung cancer Brother     Heart failure Brother     Stroke Brother     Cancer Brother     COPD Brother     Heart disease Brother     Hyperlipidemia Brother     Breast cancer Sister     Osteoarthritis Sister     Hypertension Sister     Hyperlipidemia Sister     Rheum arthritis Daughter     Diabetes Mellitus Maternal Aunt     Rheum arthritis Paternal Aunt     Melanoma Neg Hx     Psoriasis Neg Hx     Lupus Neg Hx     Eczema Neg Hx     Inflammatory bowel disease Neg Hx     Chronic back pain Neg Hx     Asthma Neg Hx     Alcohol abuse Neg Hx      Social History     Socioeconomic History    Marital status: Unknown     Spouse name: None    Number of children: None    Years of education: None    Highest education level: None   Social Needs    Financial resource strain: None    Food insecurity - worry: None    Food insecurity - inability: None    Transportation needs - medical: None    Transportation needs - non-medical: None   Occupational History    None   Tobacco Use    Smoking status: Former Smoker     Last attempt to quit: 1/1/1970  "    Years since quittin.1    Smokeless tobacco: Former User   Substance and Sexual Activity    Alcohol use: Yes     Alcohol/week: 0.0 oz     Frequency: Never     Comment: RARELY    Drug use: No    Sexual activity: Not Currently   Other Topics Concern    Are you pregnant or think you may be? Not Asked    Breast-feeding Not Asked   Social History Narrative    None         Medications/Allergies: See med card    Vitals:    19 1312   BP: 128/65   Pulse: 70   Weight: 48.1 kg (106 lb)   Height: 5' 1" (1.549 m)   PainSc:   4   PainLoc: Back         Physical exam:    GENERAL: A and O x3, the patient appears well groomed and is in no acute distress.  Skin: No rashes or obvious lesions  HEENT: normocephalic, atraumatic  CARDIOVASCULAR:  RRR  LUNGS: non labored breathing  ABDOMEN: soft, nontender   UPPER EXTREMITIES: Normal alignment, normal range of motion, no atrophy, no skin changes,  hair growth and nail growth normal and equal bilaterally. No swelling, no tenderness.    LOWER EXTREMITIES:  Normal alignment, normal range of motion, no atrophy, no skin changes,  hair growth and nail growth normal and equal bilaterally. No swelling, no tenderness.    LUMBAR SPINE  Lumbar spine: ROM is full with flexion extension and oblique extension with mild increased pain.    Andry's test causes no increased pain on either side.    Supine straight leg raise is negative bilaterally.    Internal and external rotation of the hip causes no increased pain on either side.  Myofascial exam: No tenderness to palpation across lumbar paraspinous muscles.      MENTAL STATUS: normal orientation, speech, language, and fund of knowledge for social situation.  Emotional state appropriate.    CRANIAL NERVES:  II:  PERRL bilaterally,   III,IV,VI: EOMI.    V:  Facial sensation equal bilaterally  VII:  Facial motor function normal.  VIII:  Hearing equal to finger rub bilaterally  IX/X: Gag normal, palate symmetric  XI:  Shoulder shrug " equal, head turn equal  XII:  Tongue midline without fasciculations      MOTOR: Tone and bulk: normal bilateral upper and lower Strength: normal   Delt Bi Tri WE WF     R 5 5 5 5 5 5   L 5 5 5 5 5 5     IP ADD ABD Quad TA Gas HAM  R 5 5 5 5 5 5 5  L 5 5 5 5 5 5 5    SENSATION: Light touch and pinprick intact bilaterally  REFLEXES: normal, symmetric, nonbrisk.  Toes down, no clonus. No hoffmans.  GAIT: normal rise, base, steps, and arm swing.      Imaging:  None available    Assessment:  Ms. Macias is a 74 y.o. female with low back pain  1. DDD (degenerative disc disease), lumbar    2. Other spondylosis, lumbar region       Plan:  1. I have stressed the importance of physical activity and exercise to improve overall health  2.  Tramadol 50 mg q 8 h prn.   reviewed.  No aberrant behavior.   3. She has had moderate benefit from lumbar BLAKE's in the past. Pain is currently tolerable. She does not desire interventions currently. May consider repeat procedure in future  4. F/u 3 months  All medication management was performed by Dr. Kashif Starr

## 2019-05-13 ENCOUNTER — OFFICE VISIT (OUTPATIENT)
Dept: PAIN MEDICINE | Facility: CLINIC | Age: 74
End: 2019-05-13
Payer: MEDICARE

## 2019-05-13 VITALS
SYSTOLIC BLOOD PRESSURE: 112 MMHG | BODY MASS INDEX: 19.63 KG/M2 | WEIGHT: 104 LBS | HEIGHT: 61 IN | DIASTOLIC BLOOD PRESSURE: 66 MMHG | HEART RATE: 76 BPM

## 2019-05-13 DIAGNOSIS — M25.551 RIGHT HIP PAIN: Primary | ICD-10-CM

## 2019-05-13 DIAGNOSIS — M51.36 DDD (DEGENERATIVE DISC DISEASE), LUMBAR: ICD-10-CM

## 2019-05-13 DIAGNOSIS — M47.896 OTHER SPONDYLOSIS, LUMBAR REGION: ICD-10-CM

## 2019-05-13 PROCEDURE — 99214 OFFICE O/P EST MOD 30 MIN: CPT | Mod: S$PBB,,, | Performed by: PHYSICIAN ASSISTANT

## 2019-05-13 PROCEDURE — 99999 PR PBB SHADOW E&M-EST. PATIENT-LVL IV: ICD-10-PCS | Mod: PBBFAC,,, | Performed by: PHYSICIAN ASSISTANT

## 2019-05-13 PROCEDURE — 99999 PR PBB SHADOW E&M-EST. PATIENT-LVL IV: CPT | Mod: PBBFAC,,, | Performed by: PHYSICIAN ASSISTANT

## 2019-05-13 PROCEDURE — 99214 PR OFFICE/OUTPT VISIT, EST, LEVL IV, 30-39 MIN: ICD-10-PCS | Mod: S$PBB,,, | Performed by: PHYSICIAN ASSISTANT

## 2019-05-13 PROCEDURE — 99214 OFFICE O/P EST MOD 30 MIN: CPT | Mod: PBBFAC,PN | Performed by: PHYSICIAN ASSISTANT

## 2019-05-13 RX ORDER — TRAMADOL HYDROCHLORIDE 50 MG/1
50 TABLET ORAL EVERY 8 HOURS PRN
Qty: 90 TABLET | Refills: 2 | Status: SHIPPED | OUTPATIENT
Start: 2019-05-13 | End: 2019-06-11

## 2019-05-13 NOTE — PROGRESS NOTES
Referring Physician: No ref. provider found    PCP: Sera Rodriguez III, MD    CC: Lower back pain    Interval History:  Ms. Macias is a 74 y.o. female with chronic low back pain who presents today for f/u and medication refills. Pain is unchanged in quality or location. Tramadol 50 mg q 8 h and Celebrex provides moderate benefit. She reports waking up in pain. She is able to perform her ADLs and play golf. She is happy with her pain control and does not desire any interventions. She is trying CBD capsules.  Pain today is rated 5/10.    HPI:   She is a 71-year-old female referred to us for lower back pain.  Is been present for over 10 years.  She states having constant aching pain in her lower back.  Pain radiates to her right hip at times.  Pain worsens with standing, walking and getting up.  She's had moderate benefit from physical therapy in the past.  She's also had lumbar BLAKE's in the past with moderate benefit.  Pain is currently tolerable with medications.  She takes tramadol 50 mg every 12 hours as needed.  She is able to perform her ADLs with the medication.  She also takes Celebrex with mild benefits.  Tramadol was provided by her PCP until recently.  She states her current PCP is unwilling to continue her tramadol even though he has prescribed to her for over 2 years.  She denies any weakness.  No bowel bladder changes.  She rates her pain 1/10 today.    ROS:  CONSTITUTIONAL: No fevers, chills, night sweats, wt. loss, appetite changes  SKIN: no rashes or itching  ENT: No headaches, head trauma, vision changes, or eye pain  LYMPH NODES: None noticed   CV: No chest pain, palpitations.   RESP: No shortness of breath, dyspnea on exertion, cough, wheezing, or hemoptysis  GI: No nausea, emesis, diarrhea, constipation, melena, hematochezia, pain.    : No dysuria, hematuria, urgency, or frequency   HEME: No easy bruising, bleeding problems  PSYCHIATRIC: No depression, anxiety, psychosis, hallucinations.  NEURO: No  seizures, memory loss, dizziness or difficulty sleeping  MSK: + History of present illness      Past Medical History:   Diagnosis Date    Hypertension     Hypothyroid     Hypothyroidism, postablative     Squamous cell carcinoma 01/2018    SCCIS anterior scalp, imiquimod     Past Surgical History:   Procedure Laterality Date    BACK SURGERY  1973    BLADDER SUSPENSION  1987    COLONOSCOPY  2011    procedure aborted. unable to complete    HYSTERECTOMY  1985    bso    OOPHORECTOMY      SPINE SURGERY       Family History   Problem Relation Age of Onset    Colon cancer Mother     Thyroid disease Mother     Cancer Mother     Diabetes Mellitus Mother     Kidney disease Mother     Hypertension Mother     Heart disease Mother     Lung cancer Father     Rheum arthritis Father     Cancer Father     Breast cancer Sister     Hypertension Sister     Hyperlipidemia Sister     Lung cancer Brother     Heart failure Brother     Stroke Brother     Cancer Brother     COPD Brother     Heart disease Brother     Hyperlipidemia Brother     Breast cancer Sister     Osteoarthritis Sister     Hypertension Sister     Hyperlipidemia Sister     Rheum arthritis Daughter     Diabetes Mellitus Maternal Aunt     Rheum arthritis Paternal Aunt     Melanoma Neg Hx     Psoriasis Neg Hx     Lupus Neg Hx     Eczema Neg Hx     Inflammatory bowel disease Neg Hx     Chronic back pain Neg Hx     Asthma Neg Hx     Alcohol abuse Neg Hx      Social History     Socioeconomic History    Marital status: Unknown     Spouse name: Not on file    Number of children: Not on file    Years of education: Not on file    Highest education level: Not on file   Occupational History    Not on file   Social Needs    Financial resource strain: Not on file    Food insecurity:     Worry: Not on file     Inability: Not on file    Transportation needs:     Medical: Not on file     Non-medical: Not on file   Tobacco Use    Smoking  "status: Former Smoker     Last attempt to quit: 1970     Years since quittin.3    Smokeless tobacco: Former User   Substance and Sexual Activity    Alcohol use: Yes     Alcohol/week: 0.0 oz     Frequency: Never     Comment: RARELY    Drug use: No    Sexual activity: Not Currently   Lifestyle    Physical activity:     Days per week: Not on file     Minutes per session: Not on file    Stress: Not on file   Relationships    Social connections:     Talks on phone: Not on file     Gets together: Not on file     Attends Restorationist service: Not on file     Active member of club or organization: Not on file     Attends meetings of clubs or organizations: Not on file     Relationship status: Not on file   Other Topics Concern    Are you pregnant or think you may be? Not Asked    Breast-feeding Not Asked   Social History Narrative    Not on file         Medications/Allergies: See med card    Vitals:    19 1333   BP: 112/66   Pulse: 76   Weight: 47.2 kg (104 lb)   Height: 5' 1" (1.549 m)   PainSc:   5   PainLoc: Back         Physical exam:    GENERAL: A and O x3, the patient appears well groomed and is in no acute distress.  Skin: No rashes or obvious lesions  HEENT: normocephalic, atraumatic  CARDIOVASCULAR:  RRR  LUNGS: non labored breathing  ABDOMEN: soft, nontender   UPPER EXTREMITIES: Normal alignment, normal range of motion, no atrophy, no skin changes,  hair growth and nail growth normal and equal bilaterally. No swelling, no tenderness.    LOWER EXTREMITIES:  Normal alignment, normal range of motion, no atrophy, no skin changes,  hair growth and nail growth normal and equal bilaterally. No swelling, no tenderness.    LUMBAR SPINE  Lumbar spine: ROM is full with flexion extension and oblique extension with mild increased pain.    Andry's test causes no increased pain on either side.    Supine straight leg raise is negative bilaterally.    Internal and external rotation of the hip causes no " increased pain on either side.  Myofascial exam: No tenderness to palpation across lumbar paraspinous muscles.      MENTAL STATUS: normal orientation, speech, language, and fund of knowledge for social situation.  Emotional state appropriate.    CRANIAL NERVES:  II:  PERRL bilaterally,   III,IV,VI: EOMI.    V:  Facial sensation equal bilaterally  VII:  Facial motor function normal.  VIII:  Hearing equal to finger rub bilaterally  IX/X: Gag normal, palate symmetric  XI:  Shoulder shrug equal, head turn equal  XII:  Tongue midline without fasciculations      MOTOR: Tone and bulk: normal bilateral upper and lower Strength: normal   Delt Bi Tri WE WF     R 5 5 5 5 5 5   L 5 5 5 5 5 5     IP ADD ABD Quad TA Gas HAM  R 5 5 5 5 5 5 5  L 5 5 5 5 5 5 5    SENSATION: Light touch and pinprick intact bilaterally  REFLEXES: normal, symmetric, nonbrisk.  Toes down, no clonus. No hoffmans.  GAIT: normal rise, base, steps, and arm swing.      Imaging:  None available    Assessment:  Ms. Macias is a 74 y.o. female with low back pain  1. Right hip pain    2. DDD (degenerative disc disease), lumbar    3. Other spondylosis, lumbar region       Plan:  1. I have stressed the importance of physical activity and exercise to improve overall health  2.  Tramadol 50 mg q 8 h prn.   reviewed.  No aberrant behavior. UDS next visit. She does use CBD tablets.   3. She has had moderate benefit from lumbar BLAKE's in the past. Pain is currently tolerable. She does not desire interventions currently. May consider repeat procedure in future  4. F/u 3 months  All medication management was performed by Dr. Kashif Starr

## 2019-05-15 ENCOUNTER — OFFICE VISIT (OUTPATIENT)
Dept: GASTROENTEROLOGY | Facility: CLINIC | Age: 74
End: 2019-05-15
Payer: MEDICARE

## 2019-05-15 VITALS
HEIGHT: 61 IN | OXYGEN SATURATION: 98 % | BODY MASS INDEX: 20.01 KG/M2 | SYSTOLIC BLOOD PRESSURE: 146 MMHG | HEART RATE: 70 BPM | DIASTOLIC BLOOD PRESSURE: 75 MMHG | WEIGHT: 106 LBS

## 2019-05-15 DIAGNOSIS — K52.9 CHRONIC DIARRHEA: Primary | ICD-10-CM

## 2019-05-15 DIAGNOSIS — K44.9 HIATAL HERNIA: ICD-10-CM

## 2019-05-15 DIAGNOSIS — Z87.19 HISTORY OF ULCERATIVE COLITIS: Primary | ICD-10-CM

## 2019-05-15 DIAGNOSIS — R19.7 DIARRHEA, UNSPECIFIED TYPE: ICD-10-CM

## 2019-05-15 DIAGNOSIS — K21.9 GASTROESOPHAGEAL REFLUX DISEASE WITHOUT ESOPHAGITIS: ICD-10-CM

## 2019-05-15 PROCEDURE — 99213 OFFICE O/P EST LOW 20 MIN: CPT | Mod: PBBFAC,PN | Performed by: INTERNAL MEDICINE

## 2019-05-15 PROCEDURE — 99203 PR OFFICE/OUTPT VISIT, NEW, LEVL III, 30-44 MIN: ICD-10-PCS | Mod: S$PBB,,, | Performed by: INTERNAL MEDICINE

## 2019-05-15 PROCEDURE — 99999 PR PBB SHADOW E&M-EST. PATIENT-LVL III: ICD-10-PCS | Mod: PBBFAC,,, | Performed by: INTERNAL MEDICINE

## 2019-05-15 PROCEDURE — 99999 PR PBB SHADOW E&M-EST. PATIENT-LVL III: CPT | Mod: PBBFAC,,, | Performed by: INTERNAL MEDICINE

## 2019-05-15 PROCEDURE — 99203 OFFICE O/P NEW LOW 30 MIN: CPT | Mod: S$PBB,,, | Performed by: INTERNAL MEDICINE

## 2019-05-15 NOTE — PROGRESS NOTES
Subjective:       Patient ID: Suki Macias is a 74 y.o. female.    Chief Complaint: Diarrhea    She is here because of diarrhea.  She states in January she had the onset of urgency diarrhea flatulence and crampiness but she did not have hematochezia or melena.  She states that the episode lasted for 5 days and then she was given prednisone.  To 3 weeks after this she had another episode was given prednisone again and just recently had another episode of the diarrhea and is on a tapering prednisone dose   When she was in her 30s she had abdominal pain cramping and diarrhea and was told she had ulcerative colitis.  About 5 or 6 years ago she had at colonoscopy which was unsuccessful she has had multiple pelvic surgeries and she was told that the colonoscopy could not be performed. She had very minimal which he stated was unrevealing although probably show diverticulosis  she cannot pinpoint a precipitating factor.  She denies fever chills but she is eating less.  She has lost a few lb.  She has severe arthritis the neck back hips hands and knees.  She was told she had osteoarthritis.  She is on Celebrex twice a day.  She is trying to decrease that with alternating CBD oil she thinks this is helping her joints. This CT scan showed the she has a hiatal hernia. She does complain of pyrosis in the evenings.  She denies dysphagia or aspiration or cardiopulmonary symptoms   her mother  of colon cancer in her 80s      Allergies:  Review of patient's allergies indicates:   Allergen Reactions    Codeine Other (See Comments)     Hallucinations    Crestor [rosuvastatin]     Doxycycline Diarrhea    Lipitor [atorvastatin]        Medications:    Current Outpatient Medications:     aspirin (ECOTRIN) 81 MG EC tablet, Take 81 mg by mouth once daily., Disp: , Rfl:     azelastine (ASTELIN) 137 mcg (0.1 %) nasal spray, 1 spray (137 mcg total) by Nasal route 2 (two) times daily as needed for Rhinitis., Disp: 90 mL, Rfl:  3    celecoxib (CELEBREX) 200 MG capsule, Take 1 capsule (200 mg total) by mouth 2 (two) times daily as needed., Disp: 180 capsule, Rfl: 3    co-enzyme Q-10 30 mg capsule, Take 30 mg by mouth 3 (three) times daily., Disp: , Rfl:     fenofibric acid (FIBRICOR) 135 mg CpDR, TAKE 1 CAPSULE ONCE DAILY WITH EVENING MEAL FOR CHOLESTEROL, Disp: 90 capsule, Rfl: 3    levothyroxine (SYNTHROID) 112 MCG tablet, TAKE 1 TABLET EVERY MORNING ON AN EMPTY STOMACH FOR THYROID, Disp: 90 tablet, Rfl: 3    lisinopril (PRINIVIL,ZESTRIL) 2.5 MG tablet, Take 1 tablet (2.5 mg total) by mouth once daily., Disp: 90 tablet, Rfl: 3    predniSONE (DELTASONE) 20 MG tablet, Take 1 tablet (20 mg total) by mouth once daily. for 14 days, Disp: 14 tablet, Rfl: 0    PREMARIN 0.9 mg Tab, Take 1 tablet (0.9 mg total) by mouth once daily., Disp: 90 tablet, Rfl: 3    traMADol (ULTRAM) 50 mg tablet, Take 1 tablet (50 mg total) by mouth every 8 (eight) hours as needed for Pain., Disp: 90 tablet, Rfl: 2    BIOTIN ORAL, Take 5,000 mcg by mouth once daily., Disp: , Rfl:     cyanocobalamin (VITAMIN B-12) 1000 MCG tablet, Take 100 mcg by mouth once daily., Disp: , Rfl:     diclofenac sodium (VOLTAREN) 1 % Gel, Apply 2 g topically once daily., Disp: 1 Tube, Rfl: 5    fish oil-omega-3 fatty acids 300-1,000 mg capsule, Take by mouth once daily., Disp: , Rfl:     Past Medical History:   Diagnosis Date    Chronic diarrhea     Hypertension     Hypothyroid     Hypothyroidism, postablative     Squamous cell carcinoma 01/2018    SCCIS anterior scalp, imiquimod       Past Surgical History:   Procedure Laterality Date    BACK SURGERY  1973    BLADDER SUSPENSION  1987    COLONOSCOPY  2011    procedure aborted. unable to complete    HYSTERECTOMY  1985    bso    OOPHORECTOMY      SPINE SURGERY           Review of Systems   Constitutional: Positive for fatigue. Negative for appetite change, fever and unexpected weight change.   HENT: Negative for trouble  swallowing.         No jaundice.   Respiratory: Negative for cough, shortness of breath and wheezing.         No Rales, Rhonchi, or Dyspnea.   Cardiovascular: Negative for chest pain.         hypertension hyperlipidemia well controlled   Gastrointestinal: Positive for abdominal distention, abdominal pain and diarrhea. Negative for constipation.        She complains of the cramping is diarrhea and urgency with the episodes.  They generally lasts 5-8 days.  The prednisone did help     prior to January she did not have any bowel difficulties and had daily bowel movements   Endocrine:        She has hypothyroidism and she states that she is well controlled with her current medication   Genitourinary:        She has had a bladder suspension   Musculoskeletal: Positive for arthralgias and back pain. Negative for neck pain.   Skin: Negative for pallor and rash.   Neurological: Negative for dizziness, seizures, syncope, speech difficulty, weakness and numbness.   Hematological: Negative for adenopathy.   Psychiatric/Behavioral: Negative for confusion.       Objective:      Physical Exam   Constitutional: She is oriented to person, place, and time.   The an elderly appearing nonicteric white female   HENT:   Head: Normocephalic.   Eyes: Pupils are equal, round, and reactive to light. EOM are normal.   Neck: Normal range of motion. Neck supple. No tracheal deviation present. No thyromegaly present.   Cardiovascular: Normal rate, regular rhythm and normal heart sounds.   Pulmonary/Chest: Effort normal and breath sounds normal.   Abdominal: Soft. Bowel sounds are normal. She exhibits no distension and no mass. There is tenderness. There is no rebound and no guarding.   The abdomen is soft it is on plane with healed surgical scars.  There is mild tenderness in both lower quadrants.  If the masses or organomegaly not detected.  Bowel sounds are normal   Musculoskeletal:   She has symmetrical arthritic deformity of the hands wrist  and knees there is decreased range of motion   Lymphadenopathy:     She has no cervical adenopathy.   Neurological: She is alert and oriented to person, place, and time. No cranial nerve deficit.   Skin: Skin is warm and dry.   Psychiatric: She has a normal mood and affect. Her behavior is normal.   Vitals reviewed.        Plan:       There are no diagnoses linked to this encounter.

## 2019-05-15 NOTE — LETTER
May 15, 2019      Fiordaliza Martinez, NP-C  [address]  [place] [address]           [facility]  [place] [facility]  [address]  [place] [address]  Phone: [phone]  Fax: [phone]          Patient: Suki Macias   MR Number: [id number]   YOB: 1945   Date of Visit: 5/15/2019       Dear Fiordaliza Martinez:    Thank you for referring Suki Macias to me for evaluation. Attached you will find relevant portions of my assessment and plan of care.    If you have questions, please do not hesitate to call me. I look forward to following Suki Macias along with you.    Sincerely,    Ishaan Mayfield MD    Enclosure  CC:  No Recipients    If you would like to receive this communication electronically, please contact [email or web address] or [phone] to request more information on [employer] access.    For providers and/or their staff who would like to refer a patient to [facility], please contact us through our one-stop-shop provider referral line, [facility], at [phone].    If you feel you have received this communication in error or would no longer like to receive these types of communications, please e-mail [email or web address]

## 2019-05-15 NOTE — PATIENT INSTRUCTIONS
She will continue current medication diet and activities.  A sigmoidoscopy will be scheduled.  Labs will be obtained looking for ulcerative colitis

## 2019-05-21 ENCOUNTER — ANESTHESIA (OUTPATIENT)
Dept: SURGERY | Facility: HOSPITAL | Age: 74
End: 2019-05-21
Payer: MEDICARE

## 2019-05-21 ENCOUNTER — HOSPITAL ENCOUNTER (OUTPATIENT)
Facility: HOSPITAL | Age: 74
Discharge: HOME | End: 2019-05-21
Attending: INTERNAL MEDICINE | Admitting: INTERNAL MEDICINE
Payer: MEDICARE

## 2019-05-21 ENCOUNTER — ANESTHESIA EVENT (OUTPATIENT)
Dept: SURGERY | Facility: HOSPITAL | Age: 74
End: 2019-05-21
Payer: MEDICARE

## 2019-05-21 VITALS
SYSTOLIC BLOOD PRESSURE: 120 MMHG | WEIGHT: 105 LBS | RESPIRATION RATE: 14 BRPM | TEMPERATURE: 97 F | OXYGEN SATURATION: 100 % | HEIGHT: 61 IN | BODY MASS INDEX: 19.83 KG/M2 | DIASTOLIC BLOOD PRESSURE: 58 MMHG | HEART RATE: 56 BPM

## 2019-05-21 DIAGNOSIS — K52.9 CHRONIC DIARRHEA: ICD-10-CM

## 2019-05-21 PROBLEM — K57.30 DIVERTICULOSIS OF LARGE INTESTINE WITHOUT HEMORRHAGE: Status: ACTIVE | Noted: 2019-05-21

## 2019-05-21 PROBLEM — K64.8 INTERNAL HEMORRHOIDS: Status: ACTIVE | Noted: 2019-05-21

## 2019-05-21 LAB
C DIFF GDH STL QL: NEGATIVE
C DIFF TOX A+B STL QL IA: NEGATIVE

## 2019-05-21 PROCEDURE — 25000003 PHARM REV CODE 250: Performed by: ANESTHESIOLOGY

## 2019-05-21 PROCEDURE — 88305 TISSUE SPECIMEN TO PATHOLOGY - SURGERY: ICD-10-PCS | Mod: 26,,, | Performed by: PATHOLOGY

## 2019-05-21 PROCEDURE — 93005 ELECTROCARDIOGRAM TRACING: CPT

## 2019-05-21 PROCEDURE — 45380 PR COLONOSCOPY,BIOPSY: ICD-10-PCS | Mod: 52,,, | Performed by: INTERNAL MEDICINE

## 2019-05-21 PROCEDURE — 45331 SIGMOIDOSCOPY AND BIOPSY: CPT | Performed by: INTERNAL MEDICINE

## 2019-05-21 PROCEDURE — 45380 COLONOSCOPY AND BIOPSY: CPT | Mod: 52,,, | Performed by: INTERNAL MEDICINE

## 2019-05-21 PROCEDURE — 87449 NOS EACH ORGANISM AG IA: CPT

## 2019-05-21 PROCEDURE — 27201012 HC FORCEPS, HOT/COLD, DISP: Performed by: INTERNAL MEDICINE

## 2019-05-21 PROCEDURE — D9220A PRA ANESTHESIA: Mod: ANES,,, | Performed by: ANESTHESIOLOGY

## 2019-05-21 PROCEDURE — 88305 TISSUE EXAM BY PATHOLOGIST: CPT | Performed by: PATHOLOGY

## 2019-05-21 PROCEDURE — S0028 INJECTION, FAMOTIDINE, 20 MG: HCPCS | Performed by: ANESTHESIOLOGY

## 2019-05-21 PROCEDURE — 37000008 HC ANESTHESIA 1ST 15 MINUTES: Performed by: INTERNAL MEDICINE

## 2019-05-21 PROCEDURE — 87046 STOOL CULTR AEROBIC BACT EA: CPT | Mod: 59

## 2019-05-21 PROCEDURE — D9220A PRA ANESTHESIA: ICD-10-PCS | Mod: ANES,,, | Performed by: ANESTHESIOLOGY

## 2019-05-21 PROCEDURE — 87329 GIARDIA AG IA: CPT

## 2019-05-21 PROCEDURE — 87427 SHIGA-LIKE TOXIN AG IA: CPT

## 2019-05-21 PROCEDURE — 25000003 PHARM REV CODE 250: Performed by: NURSE ANESTHETIST, CERTIFIED REGISTERED

## 2019-05-21 PROCEDURE — 88305 TISSUE EXAM BY PATHOLOGIST: CPT | Mod: 26,,, | Performed by: PATHOLOGY

## 2019-05-21 PROCEDURE — 63600175 PHARM REV CODE 636 W HCPCS: Performed by: NURSE ANESTHETIST, CERTIFIED REGISTERED

## 2019-05-21 PROCEDURE — 87045 FECES CULTURE AEROBIC BACT: CPT

## 2019-05-21 PROCEDURE — D9220A PRA ANESTHESIA: Mod: CRNA,,, | Performed by: NURSE ANESTHETIST, CERTIFIED REGISTERED

## 2019-05-21 PROCEDURE — D9220A PRA ANESTHESIA: ICD-10-PCS | Mod: CRNA,,, | Performed by: NURSE ANESTHETIST, CERTIFIED REGISTERED

## 2019-05-21 RX ORDER — SODIUM CHLORIDE, SODIUM LACTATE, POTASSIUM CHLORIDE, CALCIUM CHLORIDE 600; 310; 30; 20 MG/100ML; MG/100ML; MG/100ML; MG/100ML
INJECTION, SOLUTION INTRAVENOUS CONTINUOUS
Status: DISCONTINUED | OUTPATIENT
Start: 2019-05-21 | End: 2019-05-21 | Stop reason: HOSPADM

## 2019-05-21 RX ORDER — FAMOTIDINE 10 MG/ML
20 INJECTION INTRAVENOUS ONCE
Status: COMPLETED | OUTPATIENT
Start: 2019-05-21 | End: 2019-05-21

## 2019-05-21 RX ORDER — SODIUM CHLORIDE, SODIUM LACTATE, POTASSIUM CHLORIDE, CALCIUM CHLORIDE 600; 310; 30; 20 MG/100ML; MG/100ML; MG/100ML; MG/100ML
INJECTION, SOLUTION INTRAVENOUS CONTINUOUS PRN
Status: DISCONTINUED | OUTPATIENT
Start: 2019-05-21 | End: 2019-05-21

## 2019-05-21 RX ORDER — PROPOFOL 10 MG/ML
VIAL (ML) INTRAVENOUS
Status: DISCONTINUED | OUTPATIENT
Start: 2019-05-21 | End: 2019-05-21

## 2019-05-21 RX ORDER — LIDOCAINE HYDROCHLORIDE 10 MG/ML
1 INJECTION, SOLUTION EPIDURAL; INFILTRATION; INTRACAUDAL; PERINEURAL ONCE
Status: DISCONTINUED | OUTPATIENT
Start: 2019-05-21 | End: 2019-05-21 | Stop reason: HOSPADM

## 2019-05-21 RX ORDER — ONDANSETRON 2 MG/ML
4 INJECTION INTRAMUSCULAR; INTRAVENOUS DAILY PRN
Status: DISCONTINUED | OUTPATIENT
Start: 2019-05-21 | End: 2019-05-21 | Stop reason: HOSPADM

## 2019-05-21 RX ORDER — FAMOTIDINE 10 MG/ML
INJECTION INTRAVENOUS
Status: DISCONTINUED
Start: 2019-05-21 | End: 2019-05-21 | Stop reason: HOSPADM

## 2019-05-21 RX ORDER — SODIUM CHLORIDE, SODIUM LACTATE, POTASSIUM CHLORIDE, CALCIUM CHLORIDE 600; 310; 30; 20 MG/100ML; MG/100ML; MG/100ML; MG/100ML
125 INJECTION, SOLUTION INTRAVENOUS CONTINUOUS
Status: DISCONTINUED | OUTPATIENT
Start: 2019-05-21 | End: 2019-05-21 | Stop reason: HOSPADM

## 2019-05-21 RX ORDER — DIPHENHYDRAMINE HYDROCHLORIDE 50 MG/ML
12.5 INJECTION INTRAMUSCULAR; INTRAVENOUS
Status: DISCONTINUED | OUTPATIENT
Start: 2019-05-21 | End: 2019-05-21 | Stop reason: HOSPADM

## 2019-05-21 RX ADMIN — FAMOTIDINE 20 MG: 10 INJECTION INTRAVENOUS at 09:05

## 2019-05-21 RX ADMIN — PROPOFOL 100 MG: 10 INJECTION, EMULSION INTRAVENOUS at 12:05

## 2019-05-21 RX ADMIN — SODIUM CHLORIDE, POTASSIUM CHLORIDE, SODIUM LACTATE AND CALCIUM CHLORIDE: 600; 310; 30; 20 INJECTION, SOLUTION INTRAVENOUS at 11:05

## 2019-05-21 NOTE — H&P
The patient was evaluated in the office in the history and physical have not changed.  She is experience gastroenteritis with diarrhea and told she possibly has colitis.  She has good health nursing she understands the procedures of sigmoidoscopy.  Specifically she realizes that she is extremely small incidence of bleeding perforation or aspiration

## 2019-05-21 NOTE — DISCHARGE SUMMARY
She will continue current medications diet and activities.  She will follow up in the office now review her cultures stool studies and biopsies with her.  She is clinically stable. She tolerated a sigmoidoscopy without difficulty. Final diagnosis.  Diverticulosis 2.  Internal hemorrhoids 3.  Possible gastroenteritis although inflammatory bowel disease is not been excluded because she had been known prednisone

## 2019-05-21 NOTE — ANESTHESIA POSTPROCEDURE EVALUATION
Anesthesia Post Evaluation    Patient: Suki Macias    Procedure(s) Performed: Procedure(s) (LRB):  sigmoid flex (N/A)    Final Anesthesia Type: general  Patient location during evaluation: PACU  Patient participation: Yes- Able to Participate  Level of consciousness: awake and alert  Post-procedure vital signs: reviewed and stable  Pain management: adequate  Airway patency: patent  PONV status at discharge: No PONV  Anesthetic complications: no      Cardiovascular status: blood pressure returned to baseline  Respiratory status: unassisted  Hydration status: euvolemic  Follow-up not needed.          Vitals Value Taken Time   BP 99/75 5/21/2019 12:33 PM   Temp 36.2 °C (97.2 °F) 5/21/2019 12:15 PM   Pulse 56 5/21/2019 12:38 PM   Resp 13 5/21/2019 12:38 PM   SpO2 100 % 5/21/2019 12:38 PM   Vitals shown include unvalidated device data.      No case tracking events are documented in the log.      Pain/Zully Score: Zully Score: 10 (5/21/2019 12:45 PM)

## 2019-05-21 NOTE — OP NOTE
Procedure sigmoidoscopy.  Indications diarrhea.  She had the onset of diarrhea in January.  She denies hematochezia or melena.  With the patient in the procedure room in left lateral position the Pentax colonoscope was passed to the mid sigmoid.  Stool samples and biopsies were obtained. The mucosa appeared to be normal. The stool was brownish in color.  Preparation was on with the Fleet's enema.  She she did not want a colonoscopy because of fear of perforation.  Final diagnosis 1.  History of diarrhea 2.  Diverticulosis 3.  Internal hemorrhoids

## 2019-05-21 NOTE — ANESTHESIA PREPROCEDURE EVALUATION
05/21/2019  Suki Macias is a 74 y.o., female.    Anesthesia Evaluation    I have reviewed the Patient Summary Reports.    I have reviewed the Nursing Notes.   I have reviewed the Medications.     Review of Systems  Social:  Non-Smoker    Hematology/Oncology:  Hematology Normal   Oncology Normal     EENT/Dental:EENT/Dental Normal   Cardiovascular:   Hypertension    Pulmonary:  Pulmonary Normal    Hepatic/GI:   GERD    Musculoskeletal:   Arthritis     Neurological:  Neurology Normal    Endocrine:   Hypothyroidism        Physical Exam  General:  Well nourished    Airway/Jaw/Neck:  Airway Findings: Mouth Opening: Normal Tongue: Normal  General Airway Assessment: Adult  Mallampati: II  TM Distance: Normal, at least 6 cm      Dental:  Dental Findings:    Chest/Lungs:  Chest/Lungs Findings: Clear to auscultation     Heart/Vascular:  Heart Findings: Rate: Normal  Rhythm: Regular Rhythm        Mental Status:  Mental Status Findings:  Cooperative, Alert and Oriented         Anesthesia Plan  Type of Anesthesia, risks & benefits discussed:  Anesthesia Type:  general  Patient's Preference:   Intra-op Monitoring Plan: standard ASA monitors  Intra-op Monitoring Plan Comments:   Post Op Pain Control Plan: IV/PO Opioids PRN  Post Op Pain Control Plan Comments:   Induction:   IV  Beta Blocker:  Patient is not currently on a Beta-Blocker (No further documentation required).       Informed Consent: Patient understands risks and agrees with Anesthesia plan.  Questions answered. Anesthesia consent signed with patient.  ASA Score: 2     Day of Surgery Review of History & Physical: I have interviewed and examined the patient. I have reviewed the patient's H&P dated:            Ready For Surgery From Anesthesia Perspective.

## 2019-05-21 NOTE — DISCHARGE INSTRUCTIONS
Sigmoidoscopy    Sigmoidoscopy is a procedure used to view the lower colon and rectum. This test can help find the source of belly pain, rectal bleeding, and changes in bowel habits. Sigmoidoscopy is also used as part of the screening for colorectal cancer. It is done using a sigmoidoscope, a flexible tube with a viewing lens and light.  If youre 50 or over, the American Cancer Society recommends having this test in addition to stool tests, every 5 years to screen for colorectal cancer. Your healthcare provider may also recommend other colon cancer screening methods such as colonoscopy.   Getting ready  Here is how to prepare:  · Be sure to tell your healthcare provider about any medicines you take. Also tell your healthcare provider about any health conditions you may have.  · Ask your healthcare provider about the risks of the test. These include bleeding and bowel puncture.  · Your rectum and colon must be empty for the test, so be sure to follow the diet and bowel prep instructions. Otherwise the test may need to be rescheduled.  During the test  Here is what to expect:  · The test is done in the healthcare providers office or in a hospital endoscopy unit. You may wear a gown or a drape over your lower body.  · The procedure usually takes 10 to 20 minutes.  · The healthcare provider does a digital rectal exam to check for anal and rectal problems. The rectum is lubricated and the scope inserted.  · You may have a feeling similar to needing to have a bowel movement. You may also feel pressure when air is pumped into the colon This is done so that the healthcare provider can get a better view. Its expected that you will pass gas during the procedure.  After the test  Here is what to expect:  · Usually youll discuss the results with your healthcare provider right away, unless youre having other tests.  · If biopsies (tissue samples) were taken, you'll want to ask when to contact the for results.   · Try to  pass all the gas right after the test. Otherwise you may have bloating and cramping.  · After the test you can go back to your normal eating and other activities.  When to call your healthcare provider  Call if you have any of the following after the procedure:  · Pain in your belly  · Fever  · Dizziness or weakness  · Excessive rectal bleeding. Slight bleeding or spotting is normal, especially if a biopsy was taken.   Date Last Reviewed: 7/1/2016 © 2000-2017 [employer]. [address] [place] [id number]. All rights reserved. This information is not intended as a substitute for professional medical care. Always follow your healthcare professional's instructions.

## 2019-05-21 NOTE — PLAN OF CARE
at bedside discussing procedure and results with pt and pts significant other, questions answered, both express understanding

## 2019-05-21 NOTE — PLAN OF CARE
Cont sridhar dc/d. piv dc/d. Pt assisted with change of clothes at bedside, up in w/c. Pt to auto with SO driving, both encouraged to call with questions or concerns

## 2019-05-21 NOTE — [employer] PATIENT INSTRUCTIONS
Discharge Summary/Instructions after an Endoscopic Procedure  Patient Name: Suki Macias  Patient MRN: [id number]  Patient YOB: 1945  Tuesday, May 21, 2019  Ishaan Mayfield MD  RESTRICTIONS:  During your procedure today, you received medications for sedation.  These   medications may affect your judgment, balance and coordination.  Therefore,   for 24 hours, you have the following restrictions:   - DO NOT drive a car, operate machinery, make legal/financial decisions,   sign important papers or drink alcohol.    ACTIVITY:  Today: no heavy lifting, straining or running due to procedural   sedation/anesthesia.  The following day: return to full activity including work.  DIET:  Eat and drink normally unless instructed otherwise.     TREATMENT FOR COMMON SIDE EFFECTS:  - Mild abdominal pain, nausea, belching, bloating or excessive gas:  rest,   eat lightly and use a heating pad.  - Sore Throat: treat with throat lozenges and/or gargle with warm salt   water.  - Because air was used during the procedure, expelling large amounts of air   from your rectum or belching is normal.  - If a bowel prep was taken, you may not have a bowel movement for 1-3 days.    This is normal.  SYMPTOMS TO WATCH FOR AND REPORT TO YOUR PHYSICIAN:  1. Abdominal pain or bloating, other than gas cramps.  2. Chest pain.  3. Back pain.  4. Signs of infection such as: chills or fever occurring within 24 hours   after the procedure.  5. Rectal bleeding, which would show as bright red, maroon, or black stools.   (A tablespoon of blood from the rectum is not serious, especially if   hemorrhoids are present.)  6. Vomiting.  7. Weakness or dizziness.  GO DIRECTLY TO THE NEAREST EMERGENCY ROOM IF YOU HAVE ANY OF THE FOLLOWING:      Difficulty breathing              Chills and/or fever over 101 F   Persistent vomiting and/or vomiting blood   Severe abdominal pain   Severe chest pain   Black, tarry stools   Bleeding- more than one tablespoon   Any other  symptom or condition that you feel may need urgent attention  Your doctor recommends these additional instructions:  If any biopsies were taken, your doctors clinic will contact you in 1 to 2   weeks with any results.  - Discharge patient to home (ambulatory).  For questions, problems or results please call your physician - Ishaan Mayfield MD at Work:  [phone].  [facility] EMERGENCY ROOM PHONE NUMBER: [phone]  IF A COMPLICATION OR EMERGENCY SITUATION ARISES AND YOU ARE UNABLE TO REACH   YOUR PHYSICIAN - GO DIRECTLY TO THE EMERGENCY ROOM.  MD Ishaan Avilez MD  5/21/2019 12:24:37 PM  This report has been verified and signed electronically.  [employer]

## 2019-05-21 NOTE — TRANSFER OF CARE
"Anesthesia Transfer of Care Note    Patient: Suki Macias    Procedure(s) Performed: Procedure(s) (LRB):  sigmoid flex (N/A)    Patient location: PACU    Anesthesia Type: general    Transport from OR: Transported from OR on room air with adequate spontaneous ventilation    Post pain: adequate analgesia    Post assessment: no apparent anesthetic complications and tolerated procedure well    Post vital signs: stable    Level of consciousness: sedated and responds to stimulation    Nausea/Vomiting: no nausea/vomiting    Complications: none    Transfer of care protocol was followed      Last vitals:   Visit Vitals  BP (!) 170/76 (BP Location: Right arm, Patient Position: Lying)   Pulse 63   Temp 36.6 °C (97.8 °F) (Oral)   Resp 18   Ht 5' 1" (1.549 m)   Wt 47.6 kg (105 lb)   SpO2 98%   Breastfeeding? No   BMI 19.84 kg/m²     "

## 2019-05-22 LAB
CRYPTOSP AG STL QL IA: NEGATIVE
E COLI SXT1 STL QL IA: NEGATIVE
E COLI SXT2 STL QL IA: NEGATIVE
G LAMBLIA AG STL QL IA: NEGATIVE

## 2019-05-24 LAB — BACTERIA STL CULT: NORMAL

## 2019-05-29 ENCOUNTER — TELEPHONE (OUTPATIENT)
Dept: GASTROENTEROLOGY | Facility: CLINIC | Age: 74
End: 2019-05-29

## 2019-05-29 ENCOUNTER — OFFICE VISIT (OUTPATIENT)
Dept: GASTROENTEROLOGY | Facility: CLINIC | Age: 74
End: 2019-05-29
Payer: MEDICARE

## 2019-05-29 ENCOUNTER — LAB VISIT (OUTPATIENT)
Dept: LAB | Facility: HOSPITAL | Age: 74
End: 2019-05-29
Attending: INTERNAL MEDICINE
Payer: MEDICARE

## 2019-05-29 VITALS
BODY MASS INDEX: 20.2 KG/M2 | WEIGHT: 107 LBS | HEART RATE: 73 BPM | OXYGEN SATURATION: 95 % | HEIGHT: 61 IN | DIASTOLIC BLOOD PRESSURE: 72 MMHG | SYSTOLIC BLOOD PRESSURE: 127 MMHG

## 2019-05-29 DIAGNOSIS — K57.30 DIVERTICULOSIS OF LARGE INTESTINE WITHOUT HEMORRHAGE: ICD-10-CM

## 2019-05-29 DIAGNOSIS — R73.9 HYPERGLYCEMIA: ICD-10-CM

## 2019-05-29 DIAGNOSIS — E83.52 HYPERCALCEMIA: ICD-10-CM

## 2019-05-29 DIAGNOSIS — K52.9 CHRONIC DIARRHEA: Primary | ICD-10-CM

## 2019-05-29 DIAGNOSIS — Z87.19 HISTORY OF ULCERATIVE COLITIS: ICD-10-CM

## 2019-05-29 DIAGNOSIS — K64.0 GRADE I HEMORRHOIDS: ICD-10-CM

## 2019-05-29 LAB
ANION GAP SERPL CALC-SCNC: 8 MMOL/L (ref 8–16)
BASOPHILS # BLD AUTO: 0.04 K/UL (ref 0–0.2)
BASOPHILS NFR BLD: 0.5 % (ref 0–1.9)
BUN SERPL-MCNC: 20 MG/DL (ref 8–23)
CALCIUM SERPL-MCNC: 10.8 MG/DL (ref 8.7–10.5)
CHLORIDE SERPL-SCNC: 105 MMOL/L (ref 95–110)
CO2 SERPL-SCNC: 28 MMOL/L (ref 23–29)
CREAT SERPL-MCNC: 1.1 MG/DL (ref 0.5–1.4)
DIFFERENTIAL METHOD: ABNORMAL
EOSINOPHIL # BLD AUTO: 0.1 K/UL (ref 0–0.5)
EOSINOPHIL NFR BLD: 1.2 % (ref 0–8)
ERYTHROCYTE [DISTWIDTH] IN BLOOD BY AUTOMATED COUNT: 13.8 % (ref 11.5–14.5)
ERYTHROCYTE [SEDIMENTATION RATE] IN BLOOD BY WESTERGREN METHOD: 10 MM/HR (ref 0–20)
EST. GFR  (AFRICAN AMERICAN): 56.8 ML/MIN/1.73 M^2
EST. GFR  (NON AFRICAN AMERICAN): 49.2 ML/MIN/1.73 M^2
GLUCOSE SERPL-MCNC: 143 MG/DL (ref 70–110)
HCT VFR BLD AUTO: 39.3 % (ref 37–48.5)
HGB BLD-MCNC: 12.5 G/DL (ref 12–16)
IMM GRANULOCYTES # BLD AUTO: 0.03 K/UL (ref 0–0.04)
IMM GRANULOCYTES NFR BLD AUTO: 0.4 % (ref 0–0.5)
LYMPHOCYTES # BLD AUTO: 1.4 K/UL (ref 1–4.8)
LYMPHOCYTES NFR BLD: 17.2 % (ref 18–48)
MCH RBC QN AUTO: 30.5 PG (ref 27–31)
MCHC RBC AUTO-ENTMCNC: 31.8 G/DL (ref 32–36)
MCV RBC AUTO: 96 FL (ref 82–98)
MONOCYTES # BLD AUTO: 0.9 K/UL (ref 0.3–1)
MONOCYTES NFR BLD: 10.8 % (ref 4–15)
NEUTROPHILS # BLD AUTO: 5.8 K/UL (ref 1.8–7.7)
NEUTROPHILS NFR BLD: 69.9 % (ref 38–73)
NRBC BLD-RTO: 0 /100 WBC
PLATELET # BLD AUTO: 293 K/UL (ref 150–350)
PMV BLD AUTO: 11.2 FL (ref 9.2–12.9)
POTASSIUM SERPL-SCNC: 4.4 MMOL/L (ref 3.5–5.1)
RBC # BLD AUTO: 4.1 M/UL (ref 4–5.4)
SODIUM SERPL-SCNC: 141 MMOL/L (ref 136–145)
WBC # BLD AUTO: 8.33 K/UL (ref 3.9–12.7)

## 2019-05-29 PROCEDURE — 80048 BASIC METABOLIC PNL TOTAL CA: CPT

## 2019-05-29 PROCEDURE — 99999 PR PBB SHADOW E&M-EST. PATIENT-LVL III: ICD-10-PCS | Mod: PBBFAC,,, | Performed by: INTERNAL MEDICINE

## 2019-05-29 PROCEDURE — 99213 OFFICE O/P EST LOW 20 MIN: CPT | Mod: PBBFAC,PN | Performed by: INTERNAL MEDICINE

## 2019-05-29 PROCEDURE — 99213 PR OFFICE/OUTPT VISIT, EST, LEVL III, 20-29 MIN: ICD-10-PCS | Mod: S$PBB,,, | Performed by: INTERNAL MEDICINE

## 2019-05-29 PROCEDURE — 85651 RBC SED RATE NONAUTOMATED: CPT

## 2019-05-29 PROCEDURE — 99213 OFFICE O/P EST LOW 20 MIN: CPT | Mod: S$PBB,,, | Performed by: INTERNAL MEDICINE

## 2019-05-29 PROCEDURE — 36415 COLL VENOUS BLD VENIPUNCTURE: CPT

## 2019-05-29 PROCEDURE — 85025 COMPLETE CBC W/AUTO DIFF WBC: CPT

## 2019-05-29 PROCEDURE — 99999 PR PBB SHADOW E&M-EST. PATIENT-LVL III: CPT | Mod: PBBFAC,,, | Performed by: INTERNAL MEDICINE

## 2019-05-29 PROCEDURE — 86255 FLUORESCENT ANTIBODY SCREEN: CPT | Mod: 91

## 2019-05-29 RX ORDER — DIPHENOXYLATE HYDROCHLORIDE AND ATROPINE SULFATE 2.5; .025 MG/1; MG/1
1 TABLET ORAL 4 TIMES DAILY PRN
Qty: 50 TABLET | Refills: 0 | Status: SHIPPED | OUTPATIENT
Start: 2019-05-29 | End: 2019-06-08

## 2019-05-29 NOTE — PATIENT INSTRUCTIONS
She  will continue current medications and vitamins and minerals.  She will use Lomotil for the diarrhea in the mornings.  She is started on the xafaxan if the diarrhea continues she will need to have a barium enema.  The CT scan revealed that she only had diverticulosis.  The sigmoidoscopy was unrevealing.

## 2019-05-29 NOTE — LETTER
May 29, 2019      Caleb Morse MD  [address] [facility]  [place] [address]           [facility]  [address]  [place] [address]  Phone: [phone]  Fax: [phone]          Patient: Suki Macias   MR Number: [id number]   YOB: 1945   Date of Visit: 5/29/2019       Dear Dr. Caleb Morse:    Thank you for referring Suki Macias to me for evaluation. Attached you will find relevant portions of my assessment and plan of care.    If you have questions, please do not hesitate to call me. I look forward to following Suki Macias along with you.    Sincerely,    Ishaan Mayfield MD    Enclosure  CC:  No Recipients    If you would like to receive this communication electronically, please contact [email or web address] or [phone] to request more information on [employer] Link access.    For providers and/or their staff who would like to refer a patient to [facility], please contact us through our one-stop-shop provider referral line, [facility], at [phone].    If you feel you have received this communication in error or would no longer like to receive these types of communications, please e-mail [email or web address]

## 2019-05-29 NOTE — TELEPHONE ENCOUNTER
----- Message from Jamilah Echeverria sent at 5/29/2019  1:01 PM CDT -----  Type:  Pharmacy Calling to Clarify an RX    Name of Caller:  Aleja  Pharmacy Name:     [facility] [place] - [address]  [place] [address]  Phone: [phone] Fax: [phone]    Prescription Name:  Antibiotic is not available at the pharmacy  What do they need to clarify?:  Does  want to change it ?   Best Call Back Number:     Additional Information:

## 2019-05-29 NOTE — PROGRESS NOTES
Subjective:       Patient ID: Suki Macias is a 74 y.o. female.    Chief Complaint: No chief complaint on file.    She states yesterday the diarrhea started again.  She describes her bowel function as having a loose stool with associated flatulence in the morning.  Then she does not have further bowel movements.  She denies bleeding jaundice fecal incontinence or abdominal pain. The sigmoidoscopy revealed hemorrhoids.  The biopsies showed mild changes not consistent with Crohn's disease or ulcerative colitis.  The stool studies were negative.  The previous CT scan showed diverticulosis.  She denies fever chills or precipitating factor.  She is able to perform her usual activities although she has generalized arthritis which is limited her activities to a degree.  In retrospect she believes possibly she could have had some infection in January the precipitated the symptoms.  She denies bleeding.  The previous stool studies were negative in addition.  She stopped her vitamins and supplements and states it is not influenced her symptoms.      Allergies:  Review of patient's allergies indicates:   Allergen Reactions    Codeine Other (See Comments)     Hallucinations    Crestor [rosuvastatin]     Doxycycline Diarrhea    Lipitor [atorvastatin]        Medications:    Current Outpatient Medications:     aspirin (ECOTRIN) 81 MG EC tablet, Take 81 mg by mouth once daily., Disp: , Rfl:     azelastine (ASTELIN) 137 mcg (0.1 %) nasal spray, 1 spray (137 mcg total) by Nasal route 2 (two) times daily as needed for Rhinitis., Disp: 90 mL, Rfl: 3    BIOTIN ORAL, Take 5,000 mcg by mouth once daily., Disp: , Rfl:     celecoxib (CELEBREX) 200 MG capsule, Take 1 capsule (200 mg total) by mouth 2 (two) times daily as needed., Disp: 180 capsule, Rfl: 3    co-enzyme Q-10 30 mg capsule, Take 30 mg by mouth 3 (three) times daily., Disp: , Rfl:     cyanocobalamin (VITAMIN B-12) 1000 MCG tablet, Take 100 mcg by mouth once  daily., Disp: , Rfl:     fenofibric acid (FIBRICOR) 135 mg CpDR, TAKE 1 CAPSULE ONCE DAILY WITH EVENING MEAL FOR CHOLESTEROL, Disp: 90 capsule, Rfl: 3    fish oil-omega-3 fatty acids 300-1,000 mg capsule, Take by mouth once daily., Disp: , Rfl:     levothyroxine (SYNTHROID) 112 MCG tablet, TAKE 1 TABLET EVERY MORNING ON AN EMPTY STOMACH FOR THYROID, Disp: 90 tablet, Rfl: 3    lisinopril (PRINIVIL,ZESTRIL) 2.5 MG tablet, Take 1 tablet (2.5 mg total) by mouth once daily., Disp: 90 tablet, Rfl: 3    PREMARIN 0.9 mg Tab, Take 1 tablet (0.9 mg total) by mouth once daily., Disp: 90 tablet, Rfl: 3    traMADol (ULTRAM) 50 mg tablet, Take 1 tablet (50 mg total) by mouth every 8 (eight) hours as needed for Pain., Disp: 90 tablet, Rfl: 2    diclofenac sodium (VOLTAREN) 1 % Gel, Apply 2 g topically once daily., Disp: 1 Tube, Rfl: 5    diphenoxylate-atropine 2.5-0.025 mg (LOMOTIL) 2.5-0.025 mg per tablet, Take 1 tablet by mouth 4 (four) times daily as needed for Diarrhea., Disp: 50 tablet, Rfl: 0    rifAXIMin (XIFAXAN) 550 mg Tab, Take 1 tablet (550 mg total) by mouth 2 (two) times daily., Disp: 20 tablet, Rfl: 1    Past Medical History:   Diagnosis Date    Chronic diarrhea     Hypertension     Hypothyroid     Hypothyroidism, postablative     Squamous cell carcinoma 01/2018    SCCIS anterior scalp, imiquimod       Past Surgical History:   Procedure Laterality Date    BACK SURGERY  1973    BLADDER SUSPENSION  1987    COLONOSCOPY  2011    procedure aborted. unable to complete    HYSTERECTOMY  1985    bso    OOPHORECTOMY      sigmoid flex N/A 5/21/2019    Per[facility]    SPINE SURGERY           Review of Systems   Constitutional: Negative for appetite change, fever and unexpected weight change.   HENT: Negative for trouble swallowing.         No jaundice.   Respiratory: Negative for cough, shortness of breath and wheezing.         No Rales, Rhonchi, or Dyspnea.   Cardiovascular:  Negative for chest pain.   Gastrointestinal: Positive for diarrhea. Negative for abdominal distention, abdominal pain, anal bleeding and blood in stool.   Endocrine:        History of hypo thyroidism which she states is well controlled.   Musculoskeletal: Positive for arthralgias and back pain. Negative for neck pain.        She has severe osteoarthritis of the hands hips and knees and ankles.   Skin: Negative for pallor and rash.   Neurological: Negative for dizziness, seizures, syncope, speech difficulty, weakness and numbness.   Hematological: Negative for adenopathy.   Psychiatric/Behavioral: Negative for confusion.       Objective:      Physical Exam   Constitutional: She is oriented to person, place, and time.   Well-nourished well-hydrated thin elderly nonicteric white female   HENT:   Head: Normocephalic.   Eyes: Pupils are equal, round, and reactive to light. EOM are normal.   Neck: Normal range of motion. Neck supple. No tracheal deviation present. No thyromegaly present.   Cardiovascular: Normal rate and regular rhythm.   Pulmonary/Chest: Effort normal and breath sounds normal.   Abdominal: Soft. Bowel sounds are normal.   Abdomen is soft it is on plane without tenderness masses or organomegaly.  Bowel sounds are normal.  The multiple healed scars.   Musculoskeletal: Normal range of motion.   Lymphadenopathy:     She has no cervical adenopathy.   Neurological: She is alert and oriented to person, place, and time. No cranial nerve deficit.   Skin: Skin is warm and dry.   Psychiatric: She has a normal mood and affect. Her behavior is normal.   Vitals reviewed.        Plan:       Chronic diarrhea    Diverticulosis of large intestine without hemorrhage    Grade I hemorrhoids    Other orders  -     rifAXIMin (XIFAXAN) 550 mg Tab; Take 1 tablet (550 mg total) by mouth 2 (two) times daily.  Dispense: 20 tablet; Refill: 1  -     diphenoxylate-atropine 2.5-0.025 mg (LOMOTIL) 2.5-0.025 mg per tablet; Take 1 tablet  by mouth 4 (four) times daily as needed for Diarrhea.  Dispense: 50 tablet; Refill: 0

## 2019-05-29 NOTE — TELEPHONE ENCOUNTER
Called pt with no answer. Left message stating that I had called several pharmacies, and the closest one I found that carries that abx is [facility] in [place]. Left message for pt asking if she would like Dr. Mayfield to fill it there. Left a return phone number.

## 2019-05-30 ENCOUNTER — TELEPHONE (OUTPATIENT)
Dept: GASTROENTEROLOGY | Facility: CLINIC | Age: 74
End: 2019-05-30

## 2019-05-30 DIAGNOSIS — E83.52 HYPERCALCEMIA: Primary | ICD-10-CM

## 2019-05-30 DIAGNOSIS — R73.9 HYPERGLYCEMIA: ICD-10-CM

## 2019-05-30 PROBLEM — E11.9 TYPE 2 DIABETES MELLITUS WITHOUT COMPLICATION, WITHOUT LONG-TERM CURRENT USE OF INSULIN: Status: ACTIVE | Noted: 2019-05-30

## 2019-05-30 PROBLEM — K64.0 GRADE I HEMORRHOIDS: Status: ACTIVE | Noted: 2019-05-30

## 2019-05-30 NOTE — TELEPHONE ENCOUNTER
----- Message from Rosalina Elizabeth LPN sent at 5/30/2019  8:50 AM CDT -----  Please call the patient regarding her abnormal result.  Tell her the calcium is high. High and her glucose was 143.  Plan serum calcium PTH level glucose in a.m. fasting     Abx ready for  at pharmacy.

## 2019-05-30 NOTE — TELEPHONE ENCOUNTER
Returned pt phone call. Pt informed of her lab results, and additional testing was required. Pt verbalized understanding. Scheduled fasting labs for AM. Pt also stated she picked up her rx for abx this AM.

## 2019-05-30 NOTE — TELEPHONE ENCOUNTER
----- Message from Jean Del Toro sent at 5/30/2019 12:57 PM CDT -----  Contact: same  Patient called in and stated she missed a call earlier from office regarding her lab results.  Test were done on 5/29/19 at the [place] lab.    Patient call back number is [phone]

## 2019-05-31 ENCOUNTER — LAB VISIT (OUTPATIENT)
Dept: LAB | Facility: HOSPITAL | Age: 74
End: 2019-05-31
Attending: INTERNAL MEDICINE
Payer: MEDICARE

## 2019-05-31 DIAGNOSIS — R73.9 HYPERGLYCEMIA: ICD-10-CM

## 2019-05-31 DIAGNOSIS — E83.52 HYPERCALCEMIA: ICD-10-CM

## 2019-05-31 LAB
CALCIUM SERPL-MCNC: 10.8 MG/DL (ref 8.7–10.5)
GLUCOSE SERPL-MCNC: 96 MG/DL (ref 70–110)
PTH-INTACT SERPL-MCNC: 92 PG/ML (ref 9–77)

## 2019-05-31 PROCEDURE — 36415 COLL VENOUS BLD VENIPUNCTURE: CPT

## 2019-05-31 PROCEDURE — 82310 ASSAY OF CALCIUM: CPT

## 2019-05-31 PROCEDURE — 83970 ASSAY OF PARATHORMONE: CPT

## 2019-05-31 PROCEDURE — 82947 ASSAY GLUCOSE BLOOD QUANT: CPT

## 2019-06-03 ENCOUNTER — TELEPHONE (OUTPATIENT)
Dept: SURGERY | Facility: HOSPITAL | Age: 74
End: 2019-06-03

## 2019-06-03 ENCOUNTER — TELEPHONE (OUTPATIENT)
Dept: GASTROENTEROLOGY | Facility: CLINIC | Age: 74
End: 2019-06-03

## 2019-06-03 LAB
ANCA AB TITR SER IF: NORMAL TITER
P-ANCA TITR SER IF: NORMAL TITER

## 2019-06-03 NOTE — TELEPHONE ENCOUNTER
Spoke with pt to inform her of her calcium level, and to follow-up with her PCP. Pt verbalized understanding.

## 2019-06-03 NOTE — TELEPHONE ENCOUNTER
----- Message from Ishaan Mayfield MD sent at 6/3/2019  9:27 AM CDT -----  Please call the patient regarding her abnormal result.  Tell her her calcium was elevated but her glucose is normal. She needs to follow up with her PCP for further evaluation of the elevated calcium.

## 2019-06-19 ENCOUNTER — LAB VISIT (OUTPATIENT)
Dept: LAB | Facility: HOSPITAL | Age: 74
End: 2019-06-19
Attending: INTERNAL MEDICINE
Payer: MEDICARE

## 2019-06-19 ENCOUNTER — OFFICE VISIT (OUTPATIENT)
Dept: GASTROENTEROLOGY | Facility: CLINIC | Age: 74
End: 2019-06-19
Payer: MEDICARE

## 2019-06-19 VITALS
DIASTOLIC BLOOD PRESSURE: 75 MMHG | BODY MASS INDEX: 19.83 KG/M2 | SYSTOLIC BLOOD PRESSURE: 133 MMHG | HEART RATE: 69 BPM | WEIGHT: 105 LBS | HEIGHT: 61 IN | OXYGEN SATURATION: 95 %

## 2019-06-19 DIAGNOSIS — K57.30 DIVERTICULOSIS OF LARGE INTESTINE WITHOUT HEMORRHAGE: ICD-10-CM

## 2019-06-19 DIAGNOSIS — K52.9 CHRONIC DIARRHEA: ICD-10-CM

## 2019-06-19 DIAGNOSIS — E83.52 HYPERCALCEMIA: Primary | ICD-10-CM

## 2019-06-19 DIAGNOSIS — E83.52 HYPERCALCEMIA: ICD-10-CM

## 2019-06-19 PROCEDURE — 99999 PR PBB SHADOW E&M-EST. PATIENT-LVL III: CPT | Mod: PBBFAC,,, | Performed by: INTERNAL MEDICINE

## 2019-06-19 PROCEDURE — 99213 OFFICE O/P EST LOW 20 MIN: CPT | Mod: S$PBB,,, | Performed by: INTERNAL MEDICINE

## 2019-06-19 PROCEDURE — 99999 PR PBB SHADOW E&M-EST. PATIENT-LVL III: ICD-10-PCS | Mod: PBBFAC,,, | Performed by: INTERNAL MEDICINE

## 2019-06-19 PROCEDURE — 36415 COLL VENOUS BLD VENIPUNCTURE: CPT

## 2019-06-19 PROCEDURE — 83970 ASSAY OF PARATHORMONE: CPT

## 2019-06-19 PROCEDURE — 99213 PR OFFICE/OUTPT VISIT, EST, LEVL III, 20-29 MIN: ICD-10-PCS | Mod: S$PBB,,, | Performed by: INTERNAL MEDICINE

## 2019-06-19 PROCEDURE — 99213 OFFICE O/P EST LOW 20 MIN: CPT | Mod: PBBFAC,PN | Performed by: INTERNAL MEDICINE

## 2019-06-19 NOTE — LETTER
June 19, 2019      Fiordaliza Martinez, NP-C  [address]  [place] [address]           [facility]  [place] [facility]  [address]  [place] [address]  Phone: [phone]  Fax: [phone]          Patient: Suki Macias   MR Number: [id number]   YOB: 1945   Date of Visit: 6/19/2019       Dear Fiordaliza Martinez:    Thank you for referring Suki Macias to me for evaluation. Attached you will find relevant portions of my assessment and plan of care.    If you have questions, please do not hesitate to call me. I look forward to following Suki Macias along with you.    Sincerely,    Ishaan Mayfield MD    Enclosure  CC:  No Recipients    If you would like to receive this communication electronically, please contact [email or web address] or [phone] to request more information on [employer] Link access.    For providers and/or their staff who would like to refer a patient to [facility], please contact us through our one-stop-shop provider referral line, [facility], at [phone].    If you feel you have received this communication in error or would no longer like to receive these types of communications, please e-mail [email or web address]

## 2019-06-19 NOTE — PATIENT INSTRUCTIONS
She will continue her current medication diet and activity.  She went for adequate fiber in the diet.  A parathyroid hormone level will be obtained.  The calcium was elevated and I will arrange PCP for her.  She will continue her vitamins and minerals.

## 2019-06-19 NOTE — PROGRESS NOTES
Subjective:       Patient ID: Suki Macias is a 74 y.o. female.    Chief Complaint: Follow-up (2 week f/u)    The diarrhea has resolved.  In retrospect she probably had an episode of infectious diarrhea in January.  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  Additionally she has diverticulosis and extensive tortuosity of the sigmoid colon.  She denies cardiopulmonary symptoms.  She consumes occasional wine.  She is having normal bowel movements.  Years ago she had radiation for hyperthyroidism.  Her calcium is elevated.  She has not returned her PCP.  She will be referred to the PCP and a parathyroid hormone level will be obtained. She has extensive arthritis but is able to perform her usual activities.  She denies fever chills jaundice or bleeding.      Allergies:  Review of patient's allergies indicates:   Allergen Reactions    Codeine Other (See Comments)     Hallucinations    Crestor [rosuvastatin]     Doxycycline Diarrhea    Lipitor [atorvastatin]        Medications:    Current Outpatient Medications:     aspirin (ECOTRIN) 81 MG EC tablet, Take 81 mg by mouth once daily., Disp: , Rfl:     azelastine (ASTELIN) 137 mcg (0.1 %) nasal spray, 1 spray (137 mcg total) by Nasal route 2 (two) times daily as needed for Rhinitis., Disp: 90 mL, Rfl: 3    celecoxib (CELEBREX) 200 MG capsule, Take 1 capsule (200 mg total) by mouth 2 (two) times daily as needed., Disp: 180 capsule, Rfl: 3    fenofibric acid (FIBRICOR) 135 mg CpDR, TAKE 1 CAPSULE ONCE DAILY WITH EVENING MEAL FOR CHOLESTEROL, Disp: 90 capsule, Rfl: 3    levothyroxine (SYNTHROID) 112 MCG tablet, TAKE 1 TABLET EVERY MORNING ON AN EMPTY STOMACH FOR THYROID, Disp: 90 tablet, Rfl: 3    lisinopril (PRINIVIL,ZESTRIL) 2.5 MG tablet, Take 1 tablet (2.5 mg total) by mouth once daily., Disp: 90 tablet, Rfl: 3    PREMARIN 0.9 mg Tab, Take 1 tablet (0.9 mg total) by mouth once daily., Disp: 90 tablet, Rfl: 3    BIOTIN ORAL, Take 5,000 mcg by  mouth once daily., Disp: , Rfl:     co-enzyme Q-10 30 mg capsule, Take 30 mg by mouth 3 (three) times daily., Disp: , Rfl:     cyanocobalamin (VITAMIN B-12) 1000 MCG tablet, Take 100 mcg by mouth once daily., Disp: , Rfl:     diclofenac sodium (VOLTAREN) 1 % Gel, Apply 2 g topically once daily., Disp: 1 Tube, Rfl: 5    fish oil-omega-3 fatty acids 300-1,000 mg capsule, Take by mouth once daily., Disp: , Rfl:     rifAXIMin (XIFAXAN) 550 mg Tab, Take 1 tablet (550 mg total) by mouth 2 (two) times daily., Disp: 20 tablet, Rfl: 1    Past Medical History:   Diagnosis Date    Chronic diarrhea     Hypertension     Hypothyroid     Hypothyroidism, postablative     Squamous cell carcinoma 01/2018    SCCIS anterior scalp, imiquimod       Past Surgical History:   Procedure Laterality Date    BACK SURGERY  1973    BLADDER SUSPENSION  1987    COLONOSCOPY  2011    procedure aborted. unable to complete    HYSTERECTOMY  1985    bso    OOPHORECTOMY      sigmoid flex N/A 5/21/2019    [facility]    SPINE SURGERY           Review of Systems   Constitutional: Negative for appetite change, fever and unexpected weight change.   HENT: Negative for trouble swallowing.         No jaundice.   Respiratory: Negative for cough, shortness of breath and wheezing.         No Rales, Rhonchi, or Dyspnea.   Cardiovascular: Negative for chest pain.        She denies cardiopulmonary symptoms but she has decreased physical activity   Gastrointestinal: Negative for abdominal distention, abdominal pain, anal bleeding, blood in stool, constipation, diarrhea, nausea, rectal pain and vomiting.   Endocrine:        She states her thyroid function is normal on her current medications   Musculoskeletal: Positive for arthralgias and back pain. Negative for neck pain.        The Celebrex has helped the arthritic symptoms   Skin: Negative for pallor and rash.   Neurological: Negative for dizziness, seizures, syncope,  speech difficulty, weakness and numbness.   Hematological: Negative for adenopathy.   Psychiatric/Behavioral: Negative for confusion.       Objective:      Physical Exam   Constitutional: She is oriented to person, place, and time.   Thin elderly nonicteric white female   HENT:   Head: Normocephalic.   Eyes: Pupils are equal, round, and reactive to light. EOM are normal.   Neck: Normal range of motion. Neck supple. No tracheal deviation present. No thyromegaly present.   Cardiovascular: Normal rate, regular rhythm and normal heart sounds.   Pulmonary/Chest: Effort normal and breath sounds normal.   Abdominal: Soft. Bowel sounds are normal. She exhibits no distension and no mass. There is no tenderness. There is no rebound and no guarding. No hernia.   Abdomen is soft without organomegaly or masses felt.  Bowel sounds are normal.   Musculoskeletal:   Symmetrical arthritic changes of the hands wrist and knees.   Lymphadenopathy:     She has no cervical adenopathy.   Neurological: She is alert and oriented to person, place, and time. No cranial nerve deficit.   Skin: Skin is warm and dry.   Psychiatric: She has a normal mood and affect. Her behavior is normal.   Vitals reviewed.        Plan:       Hypercalcemia  -     PTH, intact; Future; Expected date: 06/19/2019  -     Ambulatory Referral to Family Practice    Diverticulosis of large intestine without hemorrhage    Chronic diarrhea     She will continue her current medication diet and activity.  Parathyroid hormone level will be obtained.  She will follow-up with her PCP.  She continues her vitamins minerals and current medications.

## 2019-06-20 ENCOUNTER — TELEPHONE (OUTPATIENT)
Dept: GASTROENTEROLOGY | Facility: CLINIC | Age: 74
End: 2019-06-20

## 2019-06-20 LAB — PTH-INTACT SERPL-MCNC: 98 PG/ML (ref 9–77)

## 2019-06-20 NOTE — TELEPHONE ENCOUNTER
----- Message from Ishaan Mayfield MD sent at 6/20/2019  7:38 AM CDT -----  Tell her the PTH hormone level is elevated.  She is scheduled to see Dr. Rambo Mayfield and 4 days and he can recur evaluate her and if necessary send her to an endocrinologist.  Her

## 2019-06-21 ENCOUNTER — TELEPHONE (OUTPATIENT)
Dept: GASTROENTEROLOGY | Facility: CLINIC | Age: 74
End: 2019-06-21

## 2019-06-21 NOTE — TELEPHONE ENCOUNTER
----- Message from Diane Carrera sent at 6/21/2019  8:55 AM CDT -----  Contact: [phone]  Patient is returning nurse's phone call.  Please call patient back at [phone].

## 2019-06-24 ENCOUNTER — OFFICE VISIT (OUTPATIENT)
Dept: FAMILY MEDICINE | Facility: CLINIC | Age: 74
End: 2019-06-24
Payer: MEDICARE

## 2019-06-24 VITALS
DIASTOLIC BLOOD PRESSURE: 73 MMHG | WEIGHT: 104 LBS | OXYGEN SATURATION: 95 % | HEIGHT: 61 IN | BODY MASS INDEX: 19.63 KG/M2 | HEART RATE: 67 BPM | RESPIRATION RATE: 20 BRPM | SYSTOLIC BLOOD PRESSURE: 135 MMHG

## 2019-06-24 DIAGNOSIS — E21.3 HYPERPARATHYROIDISM: Primary | ICD-10-CM

## 2019-06-24 DIAGNOSIS — I10 ESSENTIAL HYPERTENSION: ICD-10-CM

## 2019-06-24 PROCEDURE — 99213 OFFICE O/P EST LOW 20 MIN: CPT | Mod: PBBFAC,PN | Performed by: FAMILY MEDICINE

## 2019-06-24 PROCEDURE — 99999 PR PBB SHADOW E&M-EST. PATIENT-LVL III: ICD-10-PCS | Mod: PBBFAC,,, | Performed by: FAMILY MEDICINE

## 2019-06-24 PROCEDURE — 99214 PR OFFICE/OUTPT VISIT, EST, LEVL IV, 30-39 MIN: ICD-10-PCS | Mod: S$PBB,,, | Performed by: FAMILY MEDICINE

## 2019-06-24 PROCEDURE — 99214 OFFICE O/P EST MOD 30 MIN: CPT | Mod: S$PBB,,, | Performed by: FAMILY MEDICINE

## 2019-06-24 PROCEDURE — 99999 PR PBB SHADOW E&M-EST. PATIENT-LVL III: CPT | Mod: PBBFAC,,, | Performed by: FAMILY MEDICINE

## 2019-06-24 RX ORDER — LISINOPRIL 2.5 MG/1
2.5 TABLET ORAL DAILY
Qty: 90 TABLET | Refills: 3 | Status: SHIPPED | OUTPATIENT
Start: 2019-06-24 | End: 2020-03-27

## 2019-06-24 RX ORDER — TRAMADOL HYDROCHLORIDE 50 MG/1
50 TABLET ORAL EVERY 8 HOURS PRN
COMMUNITY
End: 2019-08-14

## 2019-06-24 RX ORDER — FLUCONAZOLE 150 MG/1
150 TABLET ORAL DAILY
Qty: 1 TABLET | Refills: 0 | Status: SHIPPED | OUTPATIENT
Start: 2019-06-24 | End: 2019-06-24

## 2019-06-24 NOTE — LETTER
June 24, 2019      Ishaan Mayfield MD  [address] [place] [address]           [facility]  [address]  [place] [address]  Phone: [phone]  Fax: [phone]          Patient: Suki Macias   MR Number: [id number]   YOB: 1945   Date of Visit: 6/24/2019       Dear Dr. Ishaan Mayfield:    Thank you for referring Suki Macias to me for evaluation. Attached you will find relevant portions of my assessment and plan of care.    If you have questions, please do not hesitate to call me. I look forward to following Suki Macias along with you.    Sincerely,    Rambo Mayfield MD    Enclosure  CC:  No Recipients    If you would like to receive this communication electronically, please contact [email or web address] or [phone] to request more information on [facility] [employer] access.    For providers and/or their staff who would like to refer a patient to [facility], please contact us through our one-stop-shop provider referral line, [facility], at [phone].    If you feel you have received this communication in error or would no longer like to receive these types of communications, please e-mail [email or web address]

## 2019-06-24 NOTE — PROGRESS NOTES
"Subjective:       Patient ID: Suki Macias is a 74 y.o. female.    Chief Complaint: Establish Care (BW review)    Establish care    Elevated Ca and PTH  Two occasions  With some symptoms  Has been occurring for over 6 months    Review of Systems   Constitutional: Negative for activity change, appetite change, chills, fatigue and fever.   HENT: Negative for congestion, dental problem, facial swelling, nosebleeds, postnasal drip, sinus pain, sore throat, trouble swallowing and voice change.    Eyes: Negative for pain, discharge and visual disturbance.   Respiratory: Negative for apnea, cough, chest tightness and shortness of breath.    Cardiovascular: Negative for chest pain and palpitations.   Gastrointestinal: Positive for abdominal pain. Negative for blood in stool, constipation and nausea.   Endocrine: Negative for cold intolerance, polydipsia and polyuria.   Genitourinary: Negative for difficulty urinating, enuresis and flank pain.   Musculoskeletal: Negative for arthralgias and back pain.   Skin: Negative for color change.   Allergic/Immunologic: Negative for environmental allergies and immunocompromised state.   Neurological: Negative for dizziness and light-headedness.   Hematological: Negative for adenopathy.   Psychiatric/Behavioral: Negative for agitation, behavioral problems, decreased concentration and dysphoric mood. The patient is not nervous/anxious.    All other systems reviewed and are negative.        Reviewed family, medical, surgical, and social history.    Objective:      /73 (BP Location: Left arm, Patient Position: Sitting, BP Method: Small (Automatic))   Pulse 67   Resp 20   Ht 5' 1" (1.549 m)   Wt 47.2 kg (104 lb)   SpO2 95%   BMI 19.65 kg/m²   Physical Exam   Constitutional: She is oriented to person, place, and time. She appears well-developed and well-nourished. No distress.   HENT:   Head: Normocephalic and atraumatic.   Nose: Nose normal.   Mouth/Throat: Oropharynx is " clear and moist. No oropharyngeal exudate.   Eyes: Pupils are equal, round, and reactive to light. Conjunctivae and EOM are normal. No scleral icterus.   Neck: Normal range of motion. Neck supple. No thyromegaly present.   Cardiovascular: Normal rate, regular rhythm and normal heart sounds. Exam reveals no gallop and no friction rub.   No murmur heard.  Pulmonary/Chest: Effort normal and breath sounds normal. No respiratory distress. She has no wheezes. She has no rales. She exhibits no tenderness.   Abdominal: Soft. Bowel sounds are normal. She exhibits no distension. There is no tenderness. There is no guarding.   Musculoskeletal: Normal range of motion. She exhibits no edema, tenderness or deformity.   Lymphadenopathy:     She has no cervical adenopathy.   Neurological: She is alert and oriented to person, place, and time. She displays normal reflexes. No cranial nerve deficit or sensory deficit. She exhibits normal muscle tone.   Skin: Skin is warm and dry. No rash noted. She is not diaphoretic. No erythema. No pallor.   Psychiatric: She has a normal mood and affect. Her behavior is normal. Judgment and thought content normal.   Nursing note and vitals reviewed.      Assessment:       1. Hyperparathyroidism    2. Essential hypertension        Plan:       Hyperparathyroidism  -     Ambulatory referral to Endocrinology    Essential hypertension  -     lisinopril (PRINIVIL,ZESTRIL) 2.5 MG tablet; Take 1 tablet (2.5 mg total) by mouth once daily.  Dispense: 90 tablet; Refill: 3    Other orders  -     Discontinue: fluconazole (DIFLUCAN) 150 MG Tab; Take 1 tablet (150 mg total) by mouth once daily. for 1 day  Dispense: 1 tablet; Refill: 0            Risks, benefits, and side effects were discussed with the patient. All questions were answered to the fullest satisfaction of the patient, and pt verbalized understanding and agreement to treatment plan. Pt was to call with any new or worsening symptoms, or present to the  MARIEL

## 2019-07-01 ENCOUNTER — TELEPHONE (OUTPATIENT)
Dept: FAMILY MEDICINE | Facility: CLINIC | Age: 74
End: 2019-07-01

## 2019-07-01 NOTE — TELEPHONE ENCOUNTER
"Spoke with pharmacy. States "I don't know why she was given the Rx but I know we fill what we receive from the provider. It was Fluticasone and it was sent on 6- at 1035".     Referral faxed.     Spoke with pt. Pt requested referral to be re faxed to Endocrine provider. Also states pharmacy gave her wrong Rx that AA did not prescribe. Requested clinic call to see why she was given Rx.      ----- Message from RT Adeel sent at 7/1/2019  3:15 PM CDT -----  Contact: pt    pt , requesting to inform she thinks the wrong medication was sent to her pharmacy that she picked up, thanks.    "

## 2019-07-01 NOTE — TELEPHONE ENCOUNTER
Fluticasone, the nasal spray? Or fluconazole, the antifungal? She shouldn't take either, and should disregard the prescription.

## 2019-07-09 DIAGNOSIS — E78.2 HYPERCHOLESTEROLEMIA WITH HYPERTRIGLYCERIDEMIA: ICD-10-CM

## 2019-07-09 NOTE — TELEPHONE ENCOUNTER
Spoke with pt regarding request for refill. Pt stated her pharmacy had called her after her visit with Dr. Rambo Mayfield and told her she had 4 prescriptions ready to be picked up. She said when she picked them up, she didn't check to see what they were, but when she got home she had been given 150mg of Diflucan from the pharmacy. Pt does not have a script for Diflucan. She stated the only medication she had not received was Fibricor. She was calling here to request to have her Fibricor filled.

## 2019-07-09 NOTE — TELEPHONE ENCOUNTER
I have spoken with pt.  Referral along with pt insurance info, demographics, labs, and office note faxed to Dr. Okeefe's office. I also have left a voicemail for Baltazar at Dr. Okeefe's office. Fiordaliza

## 2019-07-09 NOTE — TELEPHONE ENCOUNTER
----- Message from Mai Huntley sent at 7/9/2019  2:36 PM CDT -----  Type: Needs Medical Advice    Who Called:  Patient   Pharmacy name and phone #:   [place] [facility] - [place] - [address]  [place] [address]  Phone: [phone] Fax: [phone]  Best Call Back Number:[phone]  Additional Information: please contact patient regarding her referral to Dr. Irwin's office she states it has not been received. Patient  is requesting to speak to someone regarding her prescription she states her prescription was wrong, she states she was given a prescription for a yeast infection and not FIBRICOR, please contact to advise.     Thank you

## 2019-07-10 RX ORDER — FENOFIBRIC ACID 135 MG/1
CAPSULE, DELAYED RELEASE ORAL
Qty: 90 CAPSULE | Refills: 3 | Status: SHIPPED | OUTPATIENT
Start: 2019-07-10 | End: 2020-06-26

## 2019-07-17 ENCOUNTER — LAB VISIT (OUTPATIENT)
Dept: LAB | Facility: HOSPITAL | Age: 74
End: 2019-07-17
Attending: INTERNAL MEDICINE
Payer: MEDICARE

## 2019-07-17 DIAGNOSIS — E21.3 HYPERPARATHYROIDISM, UNSPECIFIED: Primary | ICD-10-CM

## 2019-07-17 DIAGNOSIS — E83.52 HYPERCALCEMIA: ICD-10-CM

## 2019-07-17 DIAGNOSIS — E89.0 POSTSURGICAL HYPOTHYROIDISM: ICD-10-CM

## 2019-07-17 LAB
ANION GAP SERPL CALC-SCNC: 10 MMOL/L (ref 8–16)
BUN SERPL-MCNC: 36 MG/DL (ref 8–23)
CALCIUM SERPL-MCNC: 10.2 MG/DL (ref 8.7–10.5)
CHLORIDE SERPL-SCNC: 102 MMOL/L (ref 95–110)
CO2 SERPL-SCNC: 26 MMOL/L (ref 23–29)
CREAT SERPL-MCNC: 1.1 MG/DL (ref 0.5–1.4)
EST. GFR  (AFRICAN AMERICAN): 56.8 ML/MIN/1.73 M^2
EST. GFR  (NON AFRICAN AMERICAN): 49.2 ML/MIN/1.73 M^2
GLUCOSE SERPL-MCNC: 89 MG/DL (ref 70–110)
PHOSPHATE SERPL-MCNC: 3.2 MG/DL (ref 2.7–4.5)
POTASSIUM SERPL-SCNC: 4.3 MMOL/L (ref 3.5–5.1)
SODIUM SERPL-SCNC: 138 MMOL/L (ref 136–145)
T4 FREE SERPL-MCNC: 2.9 NG/DL (ref 0.71–1.51)
TSH SERPL DL<=0.005 MIU/L-ACNC: 0.07 UIU/ML (ref 0.34–5.6)

## 2019-07-17 PROCEDURE — 84443 ASSAY THYROID STIM HORMONE: CPT

## 2019-07-17 PROCEDURE — 83970 ASSAY OF PARATHORMONE: CPT

## 2019-07-17 PROCEDURE — 80048 BASIC METABOLIC PNL TOTAL CA: CPT

## 2019-07-17 PROCEDURE — 82306 VITAMIN D 25 HYDROXY: CPT

## 2019-07-17 PROCEDURE — 36415 COLL VENOUS BLD VENIPUNCTURE: CPT

## 2019-07-17 PROCEDURE — 84439 ASSAY OF FREE THYROXINE: CPT

## 2019-07-17 PROCEDURE — 82340 ASSAY OF CALCIUM IN URINE: CPT

## 2019-07-17 PROCEDURE — 84100 ASSAY OF PHOSPHORUS: CPT

## 2019-07-18 LAB
25(OH)D3+25(OH)D2 SERPL-MCNC: 37 NG/ML (ref 30–96)
CALCIUM 24H UR-MRATE: 6 MG/HR (ref 4–12)
CALCIUM UR-MCNC: 23.4 MG/DL (ref 0–15)
CALCIUM URINE (MG/SPEC): 140 MG/SPEC
PTH-INTACT SERPL-MCNC: 111 PG/ML (ref 9–77)
URINE COLLECTION DURATION: 24 HR
URINE VOLUME: 600 ML

## 2019-07-30 DIAGNOSIS — Z79.899 ENCOUNTER FOR LONG-TERM (CURRENT) USE OF MEDICATIONS: ICD-10-CM

## 2019-07-30 RX ORDER — ESTROGENS, CONJUGATED 0.9 MG/1
0.9 TABLET, FILM COATED ORAL DAILY
Qty: 90 TABLET | Refills: 3 | Status: SHIPPED | OUTPATIENT
Start: 2019-07-30 | End: 2020-12-01 | Stop reason: SDUPTHER

## 2019-08-14 ENCOUNTER — OFFICE VISIT (OUTPATIENT)
Dept: PAIN MEDICINE | Facility: CLINIC | Age: 74
End: 2019-08-14
Payer: MEDICARE

## 2019-08-14 VITALS
HEIGHT: 61 IN | DIASTOLIC BLOOD PRESSURE: 70 MMHG | SYSTOLIC BLOOD PRESSURE: 161 MMHG | WEIGHT: 104 LBS | HEART RATE: 67 BPM | BODY MASS INDEX: 19.63 KG/M2

## 2019-08-14 DIAGNOSIS — F11.90 CHRONIC, CONTINUOUS USE OF OPIOIDS: Primary | ICD-10-CM

## 2019-08-14 DIAGNOSIS — M47.896 OTHER SPONDYLOSIS, LUMBAR REGION: ICD-10-CM

## 2019-08-14 DIAGNOSIS — M51.36 DDD (DEGENERATIVE DISC DISEASE), LUMBAR: ICD-10-CM

## 2019-08-14 PROCEDURE — 99999 PR PBB SHADOW E&M-EST. PATIENT-LVL III: ICD-10-PCS | Mod: PBBFAC,,, | Performed by: ANESTHESIOLOGY

## 2019-08-14 PROCEDURE — 99213 PR OFFICE/OUTPT VISIT, EST, LEVL III, 20-29 MIN: ICD-10-PCS | Mod: S$PBB,,, | Performed by: ANESTHESIOLOGY

## 2019-08-14 PROCEDURE — 99213 OFFICE O/P EST LOW 20 MIN: CPT | Mod: PBBFAC,PN | Performed by: ANESTHESIOLOGY

## 2019-08-14 PROCEDURE — 99213 OFFICE O/P EST LOW 20 MIN: CPT | Mod: S$PBB,,, | Performed by: ANESTHESIOLOGY

## 2019-08-14 PROCEDURE — 99999 PR PBB SHADOW E&M-EST. PATIENT-LVL III: CPT | Mod: PBBFAC,,, | Performed by: ANESTHESIOLOGY

## 2019-08-14 RX ORDER — LEVOTHYROXINE SODIUM 88 UG/1
88 TABLET ORAL
COMMUNITY
Start: 2019-07-24 | End: 2020-04-29 | Stop reason: SDUPTHER

## 2019-08-14 RX ORDER — TRAMADOL HYDROCHLORIDE 50 MG/1
50 TABLET ORAL EVERY 8 HOURS PRN
Qty: 90 TABLET | Refills: 2 | Status: SHIPPED | OUTPATIENT
Start: 2019-08-14 | End: 2019-10-30

## 2019-08-14 NOTE — PROGRESS NOTES
Referring Physician: No ref. provider found    PCP: Rambo Mayfield MD    CC: Lower back pain    Interval History:  Ms. Macias is a 74 y.o. female with chronic low back pain who returns to our clinic.  Lower back pain remains tolerable with her home exercises as well as some medication.  Pain is unchanged in quality or location. Tramadol 50 mg q 8 h and Celebrex provides moderate benefit. She reports waking up in pain. She is able to perform her ADLs and play golf. She is happy with her pain control and does not desire any interventions.  She has tried CBD but states it has not been helpful..  Pain today is rated 5/10.    Prior HPI:   She is a 71-year-old female referred to us for lower back pain.  Is been present for over 10 years.  She states having constant aching pain in her lower back.  Pain radiates to her right hip at times.  Pain worsens with standing, walking and getting up.  She's had moderate benefit from physical therapy in the past.  She's also had lumbar BLAKE's in the past with moderate benefit.  Pain is currently tolerable with medications.  She takes tramadol 50 mg every 12 hours as needed.  She is able to perform her ADLs with the medication.  She also takes Celebrex with mild benefits.  Tramadol was provided by her PCP until recently.  She states her current PCP is unwilling to continue her tramadol even though he has prescribed to her for over 2 years.  She denies any weakness.  No bowel bladder changes.  She rates her pain 1/10 today.    ROS:  CONSTITUTIONAL: No fevers, chills, night sweats, wt. loss, appetite changes  SKIN: no rashes or itching  ENT: No headaches, head trauma, vision changes, or eye pain  LYMPH NODES: None noticed   CV: No chest pain, palpitations.   RESP: No shortness of breath, dyspnea on exertion, cough, wheezing, or hemoptysis  GI: No nausea, emesis, diarrhea, constipation, melena, hematochezia, pain.    : No dysuria, hematuria, urgency, or frequency   HEME: No easy  bruising, bleeding problems  PSYCHIATRIC: No depression, anxiety, psychosis, hallucinations.  NEURO: No seizures, memory loss, dizziness or difficulty sleeping  MSK: + History of present illness      Past Medical History:   Diagnosis Date    Arthritis     Chronic diarrhea     Hypertension     Hypothyroid     Hypothyroidism, postablative     Squamous cell carcinoma 01/2018    SCCIS anterior scalp, imiquimod     Past Surgical History:   Procedure Laterality Date    BACK SURGERY  1973    BLADDER SUSPENSION  1987    COLONOSCOPY  2011    procedure aborted. unable to complete    HYSTERECTOMY  1985    bso    OOPHORECTOMY      sigmoid flex N/A 5/21/2019    [facility]    SPINE SURGERY       Family History   Problem Relation Age of Onset    Colon cancer Mother     Thyroid disease Mother     Cancer Mother     Diabetes Mellitus Mother     Kidney disease Mother     Hypertension Mother     Heart disease Mother     Lung cancer Father     Rheum arthritis Father     Cancer Father     Breast cancer Sister     Hypertension Sister     Hyperlipidemia Sister     Lung cancer Brother     Heart failure Brother     Stroke Brother     Cancer Brother     COPD Brother     Heart disease Brother     Hyperlipidemia Brother     Breast cancer Sister     Osteoarthritis Sister     Hypertension Sister     Hyperlipidemia Sister     Rheum arthritis Daughter     Diabetes Mellitus Maternal Aunt     Rheum arthritis Paternal Aunt     Melanoma Neg Hx     Psoriasis Neg Hx     Lupus Neg Hx     Eczema Neg Hx     Inflammatory bowel disease Neg Hx     Chronic back pain Neg Hx     Asthma Neg Hx     Alcohol abuse Neg Hx      Social History     Socioeconomic History    Marital status: Unknown     Spouse name: Not on file    Number of children: Not on file    Years of education: Not on file    Highest education level: Not on file   Occupational History    Not on file   Social Needs  "   Financial resource strain: Not on file    Food insecurity:     Worry: Not on file     Inability: Not on file    Transportation needs:     Medical: Not on file     Non-medical: Not on file   Tobacco Use    Smoking status: Former Smoker     Last attempt to quit: 1970     Years since quittin.6    Smokeless tobacco: Former User   Substance and Sexual Activity    Alcohol use: Yes     Alcohol/week: 0.0 oz     Frequency: Never     Comment: RARELY    Drug use: No    Sexual activity: Not Currently   Lifestyle    Physical activity:     Days per week: Not on file     Minutes per session: Not on file    Stress: Not on file   Relationships    Social connections:     Talks on phone: Not on file     Gets together: Not on file     Attends Caodaism service: Not on file     Active member of club or organization: Not on file     Attends meetings of clubs or organizations: Not on file     Relationship status: Not on file   Other Topics Concern    Are you pregnant or think you may be? Not Asked    Breast-feeding Not Asked   Social History Narrative    Not on file         Medications/Allergies: See med card    Vitals:    19 1048   BP: (!) 161/70   Pulse: 67   Weight: 47.2 kg (104 lb)   Height: 5' 1" (1.549 m)   PainSc:   6   PainLoc: Back         Physical exam:    GENERAL: A and O x3, the patient appears well groomed and is in no acute distress.  Skin: No rashes or obvious lesions  HEENT: normocephalic, atraumatic  CARDIOVASCULAR:  RRR  LUNGS: non labored breathing  ABDOMEN: soft, nontender   UPPER EXTREMITIES: Normal alignment, normal range of motion, no atrophy, no skin changes,  hair growth and nail growth normal and equal bilaterally. No swelling, no tenderness.    LOWER EXTREMITIES:  Normal alignment, normal range of motion, no atrophy, no skin changes,  hair growth and nail growth normal and equal bilaterally. No swelling, no tenderness.    LUMBAR SPINE  Lumbar spine: ROM is full with flexion " extension and oblique extension with mild increased pain.    Andry's test causes no increased pain on either side.    Supine straight leg raise is negative bilaterally.    Internal and external rotation of the hip causes no increased pain on either side.  Myofascial exam: No tenderness to palpation across lumbar paraspinous muscles.      MENTAL STATUS: normal orientation, speech, language, and fund of knowledge for social situation.  Emotional state appropriate.    CRANIAL NERVES:  II:  PERRL bilaterally,   III,IV,VI: EOMI.    V:  Facial sensation equal bilaterally  VII:  Facial motor function normal.  VIII:  Hearing equal to finger rub bilaterally  IX/X: Gag normal, palate symmetric  XI:  Shoulder shrug equal, head turn equal  XII:  Tongue midline without fasciculations      MOTOR: Tone and bulk: normal bilateral upper and lower Strength: normal   Delt Bi Tri WE WF     R 5 5 5 5 5 5   L 5 5 5 5 5 5     IP ADD ABD Quad TA Gas HAM  R 5 5 5 5 5 5 5  L 5 5 5 5 5 5 5    SENSATION: Light touch and pinprick intact bilaterally  REFLEXES: normal, symmetric, nonbrisk.  Toes down, no clonus. No hoffmans.  GAIT: normal rise, base, steps, and arm swing.      Imaging:  None available    Assessment:  Ms. Macias is a 74 y.o. female with low back pain  1. Chronic, continuous use of opioids    2. DDD (degenerative disc disease), lumbar    3. Other spondylosis, lumbar region       Plan:  1. I have stressed the importance of physical activity and exercise to improve overall health  2.  Tramadol 50 mg q 8 h prn.   reviewed.  No aberrant behavior. UDS next visit.    3. She has had moderate benefit from lumbar BLAKE's in the past. Pain is currently tolerable. She does not desire interventions currently. May consider repeat procedure in future  4. F/u in 3 months or sooner if needed

## 2019-08-26 ENCOUNTER — TELEPHONE (OUTPATIENT)
Dept: FAMILY MEDICINE | Facility: CLINIC | Age: 74
End: 2019-08-26

## 2019-08-26 DIAGNOSIS — Z00.00 ANNUAL PHYSICAL EXAM: Primary | ICD-10-CM

## 2019-08-26 DIAGNOSIS — Z12.39 BREAST CANCER SCREENING: ICD-10-CM

## 2019-08-26 NOTE — TELEPHONE ENCOUNTER
----- Message from Arlette Umaña sent at 8/26/2019 12:05 PM CDT -----  Contact: Pt  Pt called to speak to the nurse to request and order for her annual mammogram and would like a call back at [phone] once the order and been put in the system and is ready for scheduling.

## 2019-08-28 DIAGNOSIS — M25.542 ARTHRALGIA OF BOTH HANDS: ICD-10-CM

## 2019-08-28 DIAGNOSIS — M25.541 ARTHRALGIA OF BOTH HANDS: ICD-10-CM

## 2019-08-28 RX ORDER — CELECOXIB 200 MG/1
200 CAPSULE ORAL 2 TIMES DAILY PRN
Qty: 180 CAPSULE | Refills: 3 | Status: SHIPPED | OUTPATIENT
Start: 2019-08-28 | End: 2020-08-26

## 2019-08-28 NOTE — TELEPHONE ENCOUNTER
----- Message from Leatha Clark LPN sent at 8/27/2019  2:57 PM CDT -----  Contact: self      ----- Message -----  From: Anca Kwong  Sent: 8/27/2019   2:02 PM  To: [facility] Staff    Type:  Pharmacy Calling to Clarify an RX    Name of Caller:  patient  Pharmacy Name:    [place] [facility] - [place] - [address]  [place] [address]  Phone: [phone] Fax: [phone]  Prescription Name:  celecoxib (CELEBREX) 200 MG capsule  What do they need to clarify?:  Prior authorization  Best Call Back Number:  [phone] (home)   Additional Information:  Patient states the pharmacy requested the prior authorization and patient left message yesterday. Patient is now out of medication. Please call patient to advise. Thanks!

## 2019-09-04 ENCOUNTER — TELEPHONE (OUTPATIENT)
Dept: FAMILY MEDICINE | Facility: CLINIC | Age: 74
End: 2019-09-04

## 2019-09-04 NOTE — TELEPHONE ENCOUNTER
PA submitted pending approval.       ----- Message -----  From: Carolyn Villalpando  Sent: 9/3/2019   4:49 PM  To: [facility] Staff    Patient want to speak with a nurse regarding her Celebrex rx she been waiting on a alcides authorization for 2 weeks please call back at [phone] (home)

## 2019-09-09 ENCOUNTER — HOSPITAL ENCOUNTER (OUTPATIENT)
Dept: RADIOLOGY | Facility: HOSPITAL | Age: 74
Discharge: HOME | End: 2019-09-09
Attending: FAMILY MEDICINE
Payer: MEDICARE

## 2019-09-09 VITALS — WEIGHT: 104 LBS | HEIGHT: 61 IN | BODY MASS INDEX: 19.63 KG/M2

## 2019-09-09 DIAGNOSIS — Z12.39 BREAST CANCER SCREENING: ICD-10-CM

## 2019-09-09 DIAGNOSIS — Z00.00 ANNUAL PHYSICAL EXAM: ICD-10-CM

## 2019-09-09 PROCEDURE — 77063 MAMMO DIGITAL SCREENING BILAT WITH TOMOSYNTHESIS_CAD: ICD-10-PCS | Mod: 26,,, | Performed by: RADIOLOGY

## 2019-09-09 PROCEDURE — 77067 SCR MAMMO BI INCL CAD: CPT | Mod: 26,,, | Performed by: RADIOLOGY

## 2019-09-09 PROCEDURE — 77067 MAMMO DIGITAL SCREENING BILAT WITH TOMOSYNTHESIS_CAD: ICD-10-PCS | Mod: 26,,, | Performed by: RADIOLOGY

## 2019-09-09 PROCEDURE — 77063 BREAST TOMOSYNTHESIS BI: CPT | Mod: 26,,, | Performed by: RADIOLOGY

## 2019-09-09 PROCEDURE — 77067 SCR MAMMO BI INCL CAD: CPT | Mod: TC

## 2019-09-11 ENCOUNTER — LAB VISIT (OUTPATIENT)
Dept: LAB | Facility: HOSPITAL | Age: 74
End: 2019-09-11
Attending: INTERNAL MEDICINE
Payer: MEDICARE

## 2019-09-11 DIAGNOSIS — E21.0 PRIMARY HYPERPARATHYROIDISM: ICD-10-CM

## 2019-09-11 DIAGNOSIS — E21.0 PRIMARY HYPERPARATHYROIDISM: Primary | ICD-10-CM

## 2019-09-11 DIAGNOSIS — E83.52 HYPERCALCEMIA: ICD-10-CM

## 2019-09-11 DIAGNOSIS — E89.0 POSTSURGICAL HYPOTHYROIDISM: ICD-10-CM

## 2019-09-11 LAB
ANION GAP SERPL CALC-SCNC: 13 MMOL/L (ref 8–16)
BUN SERPL-MCNC: 32 MG/DL (ref 8–23)
CALCIUM SERPL-MCNC: 10.1 MG/DL (ref 8.7–10.5)
CHLORIDE SERPL-SCNC: 99 MMOL/L (ref 95–110)
CO2 SERPL-SCNC: 26 MMOL/L (ref 23–29)
CREAT SERPL-MCNC: 1 MG/DL (ref 0.5–1.4)
EST. GFR  (AFRICAN AMERICAN): >60 ML/MIN/1.73 M^2
EST. GFR  (NON AFRICAN AMERICAN): 55.2 ML/MIN/1.73 M^2
GLUCOSE SERPL-MCNC: 103 MG/DL (ref 70–110)
POTASSIUM SERPL-SCNC: 4.3 MMOL/L (ref 3.5–5.1)
PTH-INTACT SERPL-MCNC: 69 PG/ML (ref 9–77)
SODIUM SERPL-SCNC: 138 MMOL/L (ref 136–145)
T4 FREE SERPL-MCNC: 2.07 NG/DL (ref 0.71–1.51)
TSH SERPL DL<=0.005 MIU/L-ACNC: 1.05 UIU/ML (ref 0.34–5.6)

## 2019-09-11 PROCEDURE — 84443 ASSAY THYROID STIM HORMONE: CPT

## 2019-09-11 PROCEDURE — 83970 ASSAY OF PARATHORMONE: CPT

## 2019-09-11 PROCEDURE — 80048 BASIC METABOLIC PNL TOTAL CA: CPT

## 2019-09-11 PROCEDURE — 36415 COLL VENOUS BLD VENIPUNCTURE: CPT

## 2019-09-11 PROCEDURE — 84439 ASSAY OF FREE THYROXINE: CPT

## 2019-09-16 ENCOUNTER — OFFICE VISIT (OUTPATIENT)
Dept: GASTROENTEROLOGY | Facility: CLINIC | Age: 74
End: 2019-09-16
Payer: MEDICARE

## 2019-09-16 VITALS
OXYGEN SATURATION: 95 % | HEIGHT: 61 IN | WEIGHT: 106 LBS | BODY MASS INDEX: 20.01 KG/M2 | HEART RATE: 61 BPM | DIASTOLIC BLOOD PRESSURE: 81 MMHG | RESPIRATION RATE: 18 BRPM | SYSTOLIC BLOOD PRESSURE: 182 MMHG

## 2019-09-16 DIAGNOSIS — K21.9 GASTROESOPHAGEAL REFLUX DISEASE WITHOUT ESOPHAGITIS: ICD-10-CM

## 2019-09-16 DIAGNOSIS — R19.7 DIARRHEA, UNSPECIFIED TYPE: ICD-10-CM

## 2019-09-16 DIAGNOSIS — K57.30 DIVERTICULOSIS OF LARGE INTESTINE WITHOUT HEMORRHAGE: Primary | ICD-10-CM

## 2019-09-16 PROCEDURE — 99213 OFFICE O/P EST LOW 20 MIN: CPT | Mod: PBBFAC,PN | Performed by: INTERNAL MEDICINE

## 2019-09-16 PROCEDURE — 99999 PR PBB SHADOW E&M-EST. PATIENT-LVL III: CPT | Mod: PBBFAC,,, | Performed by: INTERNAL MEDICINE

## 2019-09-16 PROCEDURE — 99213 PR OFFICE/OUTPT VISIT, EST, LEVL III, 20-29 MIN: ICD-10-PCS | Mod: S$PBB,,, | Performed by: INTERNAL MEDICINE

## 2019-09-16 PROCEDURE — 99213 OFFICE O/P EST LOW 20 MIN: CPT | Mod: S$PBB,,, | Performed by: INTERNAL MEDICINE

## 2019-09-16 PROCEDURE — 99999 PR PBB SHADOW E&M-EST. PATIENT-LVL III: ICD-10-PCS | Mod: PBBFAC,,, | Performed by: INTERNAL MEDICINE

## 2019-09-16 NOTE — PROGRESS NOTES
Subjective:       Patient ID: Suki Macias is a 74 y.o. female.    Chief Complaint: Follow-up    She denies for the diarrhea.  She denies hematemesis hematochezia jaundice or bleeding.  She is having daily bowel movements and she is physically active.  She denies gastrointestinal reflux.  She has seen the endocrinologist who he is regulating her thyroid dosage.  She denies fever chills. Her current medications have resolved her joint symptoms.      Allergies:  Review of patient's allergies indicates:   Allergen Reactions    Codeine Other (See Comments)     Hallucinations    Crestor [rosuvastatin]     Doxycycline Diarrhea    Lipitor [atorvastatin]        Medications:    Current Outpatient Medications:     aspirin (ECOTRIN) 81 MG EC tablet, Take 81 mg by mouth once daily., Disp: , Rfl:     azelastine (ASTELIN) 137 mcg (0.1 %) nasal spray, 1 spray (137 mcg total) by Nasal route 2 (two) times daily as needed for Rhinitis., Disp: 90 mL, Rfl: 3    BIOTIN ORAL, Take 5,000 mcg by mouth once daily., Disp: , Rfl:     celecoxib (CELEBREX) 200 MG capsule, Take 1 capsule (200 mg total) by mouth 2 (two) times daily as needed., Disp: 180 capsule, Rfl: 3    co-enzyme Q-10 30 mg capsule, Take 30 mg by mouth 3 (three) times daily., Disp: , Rfl:     diclofenac sodium (VOLTAREN) 1 % Gel, Apply 2 g topically once daily., Disp: 1 Tube, Rfl: 5    fenofibric acid (FIBRICOR) 135 mg CpDR, TAKE 1 CAPSULE ONCE DAILY WITH EVENING MEAL FOR CHOLESTEROL, Disp: 90 capsule, Rfl: 3    fish oil-omega-3 fatty acids 300-1,000 mg capsule, Take by mouth once daily., Disp: , Rfl:     levothyroxine (SYNTHROID) 112 MCG tablet, TAKE 1 TABLET EVERY MORNING ON AN EMPTY STOMACH FOR THYROID, Disp: 90 tablet, Rfl: 3    lisinopril (PRINIVIL,ZESTRIL) 2.5 MG tablet, Take 1 tablet (2.5 mg total) by mouth once daily., Disp: 90 tablet, Rfl: 3    PREMARIN 0.9 mg Tab, Take 1 tablet (0.9 mg total) by mouth once daily., Disp: 90 tablet, Rfl: 3     SYNTHROID 88 mcg tablet, , Disp: , Rfl:     traMADol (ULTRAM) 50 mg tablet, Take 1 tablet (50 mg total) by mouth every 8 (eight) hours as needed for Pain. Medically necessary greater than 7 days for chronic pain, Disp: 90 tablet, Rfl: 2    Past Medical History:   Diagnosis Date    Arthritis     Chronic diarrhea     Hypertension     Hypothyroid     Hypothyroidism, postablative     Squamous cell carcinoma 01/2018    SCCIS anterior scalp, imiquimod       Past Surgical History:   Procedure Laterality Date    BACK SURGERY  1973    BLADDER SUSPENSION  1987    BREAST BIOPSY      COLONOSCOPY  2011    procedure aborted. unable to complete    HYSTERECTOMY  1985    bso    OOPHORECTOMY      sigmoid flex N/A 5/21/2019    Performed by Ishaan [facility]field [facility] at [facility]    SPINE SURGERY           Review of Systems   Constitutional: Negative for appetite change, fever and unexpected weight change.   HENT: Negative for trouble swallowing.         No jaundice.   Respiratory: Negative for cough, shortness of breath and wheezing.         She is a former cigarette smoker.  She denies cardiopulmonary symptoms such as dyspnea on exertion or chronic cough.   Cardiovascular: Negative for chest pain.        She states her hypertension hyperlipidemia well controlled.   Gastrointestinal: Negative for abdominal distention, abdominal pain, anal bleeding, blood in stool, constipation, diarrhea and nausea.        She has history of diverticulosis and hemorrhoids.  She is having daily bowel movements.  The diarrhea is resolved.  She probably had a post infectious irritable bowel.  She denies fecal incontinence or bleeding.  The current medications have resolved the gastroesophageal reflux and she denies dysphagia aspiration.   Endocrine:        The endocrinologist is regulating her thyroid dosage and also evaluating her hypercalcemia.   Musculoskeletal: Positive for arthralgias and back pain. Negative for neck pain.   Skin: Negative for  pallor and rash.   Neurological: Negative for dizziness, seizures, syncope, speech difficulty, weakness and numbness.   Hematological: Negative for adenopathy.   Psychiatric/Behavioral: Negative for confusion.       Objective:      Physical Exam   Constitutional: She is oriented to person, place, and time. She appears well-nourished.   Well-nourished well-hydrated thin elderly nonicteric white female   HENT:   Head: Normocephalic.   Eyes: Pupils are equal, round, and reactive to light. EOM are normal.   Neck: Normal range of motion. Neck supple. No tracheal deviation present. No thyromegaly present.   Cardiovascular: Normal rate, regular rhythm and normal heart sounds.   Pulmonary/Chest: Effort normal and breath sounds normal.   Abdominal: Soft. Bowel sounds are normal. She exhibits no distension and no mass. There is no tenderness. There is no rebound and no guarding. No hernia.   Musculoskeletal: Normal range of motion.   She can ambulate without difficulty. She can get from the sitting to the standing position without difficulty.   Lymphadenopathy:     She has no cervical adenopathy.   Neurological: She is alert and oriented to person, place, and time. No cranial nerve deficit.   Skin: Skin is warm and dry.   Psychiatric: She has a normal mood and affect. Her behavior is normal.   Vitals reviewed.        Plan:       Diverticulosis of large intestine without hemorrhage    Diarrhea, unspecified type    Gastroesophageal reflux disease without esophagitis     She will continue her current medications vitamins and minerals.  She continues her nutritious diet with adequate fiber.  She will follow-up with the endocrinologist.

## 2019-09-16 NOTE — LETTER
September 16, 2019      Fiordaliza Martinez, LAUREN-C  [address]  [place]  [place] [address]           [facility]  [address]  [place] [address]  Phone: [phone]  Fax: [phone]          Patient: Suki Macias   MR Number: [id number]   YOB: 1945   Date of Visit: 9/16/2019       Dear Fiordaliza Martinez:    Thank you for referring Suki Macias to me for evaluation. Attached you will find relevant portions of my assessment and plan of care.    If you have questions, please do not hesitate to call me. I look forward to following Suki Macias along with you.    Sincerely,    Ishaan Mayfield MD    Enclosure  CC:  No Recipients    If you would like to receive this communication electronically, please contact [email or web address] or [phone] to request more information on [employer] access.    For providers and/or their staff who would like to refer a patient to [facility], please contact us through our one-stop-shop provider referral line, [facility], at [phone].    If you feel you have received this communication in error or would no longer like to receive these types of communications, please e-mail [email or web address]

## 2019-10-21 DIAGNOSIS — N95.9 MENOPAUSAL PROBLEM: ICD-10-CM

## 2019-10-21 DIAGNOSIS — E21.0 PRIMARY HYPERPARATHYROIDISM: Primary | ICD-10-CM

## 2019-10-30 ENCOUNTER — HOSPITAL ENCOUNTER (OUTPATIENT)
Dept: RADIOLOGY | Facility: HOSPITAL | Age: 74
Discharge: HOME | End: 2019-10-30
Attending: INTERNAL MEDICINE
Payer: MEDICARE

## 2019-10-30 DIAGNOSIS — M47.896 OTHER SPONDYLOSIS, LUMBAR REGION: Primary | ICD-10-CM

## 2019-10-30 DIAGNOSIS — E21.0 PRIMARY HYPERPARATHYROIDISM: Primary | ICD-10-CM

## 2019-10-30 DIAGNOSIS — E21.0 PRIMARY HYPERPARATHYROIDISM: ICD-10-CM

## 2019-10-30 DIAGNOSIS — N95.9 MENOPAUSAL PROBLEM: ICD-10-CM

## 2019-10-30 DIAGNOSIS — M51.36 DDD (DEGENERATIVE DISC DISEASE), LUMBAR: ICD-10-CM

## 2019-10-30 PROCEDURE — 77080 DXA BONE DENSITY AXIAL: CPT | Mod: TC,PO

## 2019-10-30 RX ORDER — TRAMADOL HYDROCHLORIDE 50 MG/1
50 TABLET ORAL EVERY 8 HOURS PRN
Qty: 90 TABLET | Refills: 2 | Status: SHIPPED | OUTPATIENT
Start: 2019-11-06 | End: 2020-03-18

## 2019-11-06 ENCOUNTER — HOSPITAL ENCOUNTER (OUTPATIENT)
Dept: RADIOLOGY | Facility: HOSPITAL | Age: 74
Discharge: HOME | End: 2019-11-06
Attending: NURSE PRACTITIONER
Payer: MEDICARE

## 2019-11-06 ENCOUNTER — OFFICE VISIT (OUTPATIENT)
Dept: PAIN MEDICINE | Facility: CLINIC | Age: 74
End: 2019-11-06
Payer: MEDICARE

## 2019-11-06 VITALS
SYSTOLIC BLOOD PRESSURE: 173 MMHG | DIASTOLIC BLOOD PRESSURE: 72 MMHG | HEIGHT: 61 IN | WEIGHT: 106 LBS | HEART RATE: 58 BPM | BODY MASS INDEX: 20.01 KG/M2

## 2019-11-06 DIAGNOSIS — G89.29 CHRONIC BILATERAL LOW BACK PAIN WITHOUT SCIATICA: Primary | ICD-10-CM

## 2019-11-06 DIAGNOSIS — M54.50 CHRONIC BILATERAL LOW BACK PAIN WITHOUT SCIATICA: Primary | ICD-10-CM

## 2019-11-06 DIAGNOSIS — M47.896 OTHER SPONDYLOSIS, LUMBAR REGION: ICD-10-CM

## 2019-11-06 DIAGNOSIS — M54.50 CHRONIC BILATERAL LOW BACK PAIN WITHOUT SCIATICA: ICD-10-CM

## 2019-11-06 DIAGNOSIS — G89.29 CHRONIC BILATERAL LOW BACK PAIN WITHOUT SCIATICA: ICD-10-CM

## 2019-11-06 DIAGNOSIS — M51.36 DDD (DEGENERATIVE DISC DISEASE), LUMBAR: ICD-10-CM

## 2019-11-06 DIAGNOSIS — F11.90 CHRONIC, CONTINUOUS USE OF OPIOIDS: ICD-10-CM

## 2019-11-06 PROCEDURE — 99999 PR PBB SHADOW E&M-EST. PATIENT-LVL III: CPT | Mod: PBBFAC,,, | Performed by: NURSE PRACTITIONER

## 2019-11-06 PROCEDURE — 99213 OFFICE O/P EST LOW 20 MIN: CPT | Mod: PBBFAC,PN,25 | Performed by: NURSE PRACTITIONER

## 2019-11-06 PROCEDURE — 80307 DRUG TEST PRSMV CHEM ANLYZR: CPT

## 2019-11-06 PROCEDURE — 72110 X-RAY EXAM L-2 SPINE 4/>VWS: CPT | Mod: TC,FY

## 2019-11-06 PROCEDURE — 99214 PR OFFICE/OUTPT VISIT, EST, LEVL IV, 30-39 MIN: ICD-10-PCS | Mod: S$PBB,,, | Performed by: NURSE PRACTITIONER

## 2019-11-06 PROCEDURE — 72110 XR LUMBAR SPINE 5 VIEW WITH FLEX AND EXT: ICD-10-PCS | Mod: 26,,, | Performed by: RADIOLOGY

## 2019-11-06 PROCEDURE — 99999 PR PBB SHADOW E&M-EST. PATIENT-LVL III: ICD-10-PCS | Mod: PBBFAC,,, | Performed by: NURSE PRACTITIONER

## 2019-11-06 PROCEDURE — 72110 X-RAY EXAM L-2 SPINE 4/>VWS: CPT | Mod: 26,,, | Performed by: RADIOLOGY

## 2019-11-06 PROCEDURE — 99214 OFFICE O/P EST MOD 30 MIN: CPT | Mod: S$PBB,,, | Performed by: NURSE PRACTITIONER

## 2019-11-06 NOTE — PROGRESS NOTES
Referring Physician: No ref. provider found    PCP: Rambo Mayfield MD    CC: Lower back pain    Interval history:  Suki Macias is a 74 y.o. female who returns for f/u. She reports feeling a 'twinge' in her lower back that has increased in intensity recently. The pain is not radiating into her legs. Sometimes it radiates into the right hip. She denies LE weakness or bowel/bladder dysfucntion. She is still playing gold and fully participating in ADLs, but has noticed her low back pain slightly worsening. She is considering procedures to help. Has had L-ESIs in the past with Dr. Ellis. No records available. No recent imaging available. Pt states she may have records at home. Denies having recent lumbar imaging.     Interval History:  Ms. Macias is a 74 y.o. female with chronic low back pain who returns to our clinic.  Lower back pain remains tolerable with her home exercises as well as some medication.  Pain is unchanged in quality or location. Tramadol 50 mg q 8 h and Celebrex provides moderate benefit. She reports waking up in pain. She is able to perform her ADLs and play golf. She is happy with her pain control and does not desire any interventions.  She has tried CBD but states it has not been helpful..  Pain today is rated 5/10.    Prior HPI:   She is a 71-year-old female referred to us for lower back pain.  Is been present for over 10 years.  She states having constant aching pain in her lower back.  Pain radiates to her right hip at times.  Pain worsens with standing, walking and getting up.  She's had moderate benefit from physical therapy in the past.  She's also had lumbar BLAKE's in the past with moderate benefit.  Pain is currently tolerable with medications.  She takes tramadol 50 mg every 12 hours as needed.  She is able to perform her ADLs with the medication.  She also takes Celebrex with mild benefits.  Tramadol was provided by her PCP until recently.  She states her current PCP is unwilling to  continue her tramadol even though he has prescribed to her for over 2 years.  She denies any weakness.  No bowel bladder changes.  She rates her pain 1/10 today.    ROS:  CONSTITUTIONAL: No fevers, chills, night sweats, wt. loss, appetite changes  SKIN: no rashes or itching  ENT: No headaches, head trauma, vision changes, or eye pain  LYMPH NODES: None noticed   CV: No chest pain, palpitations.   RESP: No shortness of breath, dyspnea on exertion, cough, wheezing, or hemoptysis  GI: No nausea, emesis, diarrhea, constipation, melena, hematochezia, pain.    : No dysuria, hematuria, urgency, or frequency   HEME: No easy bruising, bleeding problems  PSYCHIATRIC: No depression, anxiety, psychosis, hallucinations.  NEURO: No seizures, memory loss, dizziness or difficulty sleeping  MSK: + History of present illness      Past Medical History:   Diagnosis Date    Arthritis     Chronic diarrhea     Hypertension     Hypothyroid     Hypothyroidism, postablative     Squamous cell carcinoma 01/2018    SCCIS anterior scalp, imiquimod     Past Surgical History:   Procedure Laterality Date    BACK SURGERY  1973    BLADDER SUSPENSION  1987    BREAST BIOPSY      COLONOSCOPY  2011    procedure aborted. unable to complete    COLONOSCOPY N/A 5/21/2019    Procedure: sigmoid flex;  Surgeon: Ishaan Mayfield MD;  Location: [facility] END[facility];  Service: Endoscopy;  Laterality: N/A;    HYSTERECTOMY  1985    bso    OOPHORECTOMY      SPINE SURGERY       Family History   Problem Relation Age of Onset    Colon cancer Mother     Thyroid disease Mother     Cancer Mother     Diabetes Mellitus Mother     Kidney disease Mother     Hypertension Mother     Heart disease Mother     Lung cancer Father     Rheum arthritis Father     Cancer Father     Breast cancer Sister     Hypertension Sister     Hyperlipidemia Sister     Lung cancer Brother     Heart failure Brother     Stroke Brother     Cancer Brother     COPD Brother     Heart  disease Brother     Hyperlipidemia Brother     Breast cancer Sister     Osteoarthritis Sister     Hypertension Sister     Hyperlipidemia Sister     Rheum arthritis Daughter     Diabetes Mellitus Maternal Aunt     Rheum arthritis Paternal Aunt     Melanoma Neg Hx     Psoriasis Neg Hx     Lupus Neg Hx     Eczema Neg Hx     Inflammatory bowel disease Neg Hx     Chronic back pain Neg Hx     Asthma Neg Hx     Alcohol abuse Neg Hx      Social History     Socioeconomic History    Marital status:      Spouse name: Not on file    Number of children: Not on file    Years of education: Not on file    Highest education level: Not on file   Occupational History    Not on file   Social Needs    Financial resource strain: Not on file    Food insecurity:     Worry: Not on file     Inability: Not on file    Transportation needs:     Medical: Not on file     Non-medical: Not on file   Tobacco Use    Smoking status: Former Smoker     Last attempt to quit: 1970     Years since quittin.8    Smokeless tobacco: Former User   Substance and Sexual Activity    Alcohol use: Yes     Alcohol/week: 0.0 standard drinks     Frequency: Never     Comment: RARELY    Drug use: No    Sexual activity: Not Currently   Lifestyle    Physical activity:     Days per week: Not on file     Minutes per session: Not on file    Stress: Not on file   Relationships    Social connections:     Talks on phone: Not on file     Gets together: Not on file     Attends [employer] service: Not on file     Active member of club or organization: Not on file     Attends meetings of clubs or organizations: Not on file     Relationship status: Not on file   Other Topics Concern    Are you pregnant or think you may be? Not Asked    Breast-feeding Not Asked   Social History Narrative    Not on file         Medications/Allergies: See med card    Vitals:    19 0902   BP: (!) 173/72   Pulse: (!) 58   Weight: 48.1 kg (106 lb)  "  Height: 5' 1" (1.549 m)   PainSc:   4   PainLoc: Back         Physical exam:    GENERAL: A and O x3, the patient appears well groomed and is in no acute distress.  Skin: No rashes or obvious lesions  HEENT: normocephalic, atraumatic  CARDIOVASCULAR:  RRR  LUNGS: non labored breathing  ABDOMEN: soft, nontender   UPPER EXTREMITIES: Normal alignment, normal range of motion, no atrophy, no skin changes,  hair growth and nail growth normal and equal bilaterally. No swelling, no tenderness.    LOWER EXTREMITIES:  Normal alignment, normal range of motion, no atrophy, no skin changes,  hair growth and nail growth normal and equal bilaterally. No swelling, no tenderness.    LUMBAR SPINE  Lumbar spine: ROM is full with flexion extension and oblique extension with mild increased pain.    Andry's test causes no increased pain on either side.    Supine straight leg raise is negative bilaterally.    Internal and external rotation of the hip causes no increased pain on either side.  Myofascial exam: No tenderness to palpation across lumbar paraspinous muscles.      MENTAL STATUS: normal orientation, speech, language, and fund of knowledge for social situation.  Emotional state appropriate.    CRANIAL NERVES:  II:  PERRL bilaterally,   III,IV,VI: EOMI.    V:  Facial sensation equal bilaterally  VII:  Facial motor function normal.  VIII:  Hearing equal to finger rub bilaterally  IX/X: Gag normal, palate symmetric  XI:  Shoulder shrug equal, head turn equal  XII:  Tongue midline without fasciculations      MOTOR: Tone and bulk: normal bilateral upper and lower Strength: normal   Delt Bi Tri WE WF     R 5 5 5 5 5 5   L 5 5 5 5 5 5     IP ADD ABD Quad TA Gas HAM  R 5 5 5 5 5 5 5  L 5 5 5 5 5 5 5    SENSATION: Light touch and pinprick intact bilaterally  REFLEXES: normal, symmetric, nonbrisk.  Toes down, no clonus. No hoffmans.  GAIT: normal rise, base, steps, and arm swing.      Imaging:  None available    Assessment:  Ms. " Gilberto is a 74 y.o. female with low back pain  1. Chronic bilateral low back pain without sciatica    2. Chronic, continuous use of opioids    3. DDD (degenerative disc disease), lumbar    4. Other spondylosis, lumbar region       Plan:  1. I have stressed the importance of physical activity and exercise to improve overall health  2.  Lumbar x-rays with flex/ext given low back pain that appears to be consistent with facet-mediated low back pain. Pain has increased lately without inciting event or trauma. Consider Lumbar MBB/RFA pending xray results. She has also had moderate benefit from lumbar BLAKE's in the past.  3. Continue Tramadol 50 mg q 8 h prn.   reviewed and consistent. No aberrant behavior. UDS done today.  4. Will f/u with pt with xray results and discuss possible procedure.   5. RTC in 3 months for f/u.

## 2019-11-15 ENCOUNTER — TELEPHONE (OUTPATIENT)
Dept: PAIN MEDICINE | Facility: OTHER | Age: 74
End: 2019-11-15

## 2019-11-19 ENCOUNTER — TELEPHONE (OUTPATIENT)
Dept: FAMILY MEDICINE | Facility: CLINIC | Age: 74
End: 2019-11-19

## 2019-11-19 NOTE — TELEPHONE ENCOUNTER
Pt requesting rx sent to  pharmacy for trep throat- states she was around many sick children this weekend.  Please advise, thank you.           ----- Message from Niurka Beckman sent at 11/19/2019  1:35 PM CST -----  Contact: Patient  Type: Needs Medical Advice    Who Called:  Patient  Best Call Back Number: [phone] (home)   Additional Information: The patient said she has strep and she needs to come in today or call her in a Rx to [facility] she is miserable please call to advise. Patient said she needs some help now she declined the next avail said its to far off Thanks

## 2019-11-20 RX ORDER — AMOXICILLIN 875 MG/1
875 TABLET, FILM COATED ORAL EVERY 12 HOURS
Qty: 20 TABLET | Refills: 0 | Status: SHIPPED | OUTPATIENT
Start: 2019-11-20 | End: 2019-12-13

## 2019-11-25 DIAGNOSIS — E21.0 PRIMARY HYPERPARATHYROIDISM: Primary | ICD-10-CM

## 2019-11-25 DIAGNOSIS — N95.9 MENOPAUSAL PROBLEM: ICD-10-CM

## 2019-11-26 ENCOUNTER — HOSPITAL ENCOUNTER (OUTPATIENT)
Dept: RADIOLOGY | Facility: HOSPITAL | Age: 74
Discharge: HOME | End: 2019-11-26
Attending: INTERNAL MEDICINE
Payer: MEDICARE

## 2019-11-26 DIAGNOSIS — E21.0 PRIMARY HYPERPARATHYROIDISM: ICD-10-CM

## 2019-11-26 DIAGNOSIS — N95.9 MENOPAUSAL PROBLEM: ICD-10-CM

## 2019-11-26 PROCEDURE — 77081 DXA BONE DENSITY APPENDICULR: CPT | Mod: TC,PO

## 2019-12-09 ENCOUNTER — PATIENT OUTREACH (OUTPATIENT)
Dept: ADMINISTRATIVE | Facility: HOSPITAL | Age: 74
End: 2019-12-09

## 2019-12-13 ENCOUNTER — OFFICE VISIT (OUTPATIENT)
Dept: DERMATOLOGY | Facility: CLINIC | Age: 74
End: 2019-12-13
Payer: MEDICARE

## 2019-12-13 DIAGNOSIS — Z85.828 HISTORY OF NONMELANOMA SKIN CANCER: ICD-10-CM

## 2019-12-13 DIAGNOSIS — L81.4 SOLAR LENTIGO: ICD-10-CM

## 2019-12-13 DIAGNOSIS — D18.01 CHERRY ANGIOMA: ICD-10-CM

## 2019-12-13 DIAGNOSIS — L82.1 SEBORRHEIC KERATOSES: ICD-10-CM

## 2019-12-13 DIAGNOSIS — L57.0 ACTINIC KERATOSES: Primary | ICD-10-CM

## 2019-12-13 PROCEDURE — 17000 DESTRUCT PREMALG LESION: CPT | Mod: PBBFAC,PO | Performed by: DERMATOLOGY

## 2019-12-13 PROCEDURE — 99999 PR PBB SHADOW E&M-EST. PATIENT-LVL II: CPT | Mod: PBBFAC,,, | Performed by: DERMATOLOGY

## 2019-12-13 PROCEDURE — 17000 PR DESTRUCTION(LASER SURGERY,CRYOSURGERY,CHEMOSURGERY),PREMALIGNANT LESIONS,FIRST LESION: ICD-10-PCS | Mod: S$PBB,,, | Performed by: DERMATOLOGY

## 2019-12-13 PROCEDURE — 17000 DESTRUCT PREMALG LESION: CPT | Mod: S$PBB,,, | Performed by: DERMATOLOGY

## 2019-12-13 PROCEDURE — 1159F PR MEDICATION LIST DOCUMENTED IN MEDICAL RECORD: ICD-10-PCS | Mod: ,,, | Performed by: DERMATOLOGY

## 2019-12-13 PROCEDURE — 1159F MED LIST DOCD IN RCRD: CPT | Mod: ,,, | Performed by: DERMATOLOGY

## 2019-12-13 PROCEDURE — 99214 PR OFFICE/OUTPT VISIT, EST, LEVL IV, 30-39 MIN: ICD-10-PCS | Mod: 25,S$PBB,, | Performed by: DERMATOLOGY

## 2019-12-13 PROCEDURE — 99999 PR PBB SHADOW E&M-EST. PATIENT-LVL II: ICD-10-PCS | Mod: PBBFAC,,, | Performed by: DERMATOLOGY

## 2019-12-13 PROCEDURE — 99212 OFFICE O/P EST SF 10 MIN: CPT | Mod: PBBFAC,PO,25 | Performed by: DERMATOLOGY

## 2019-12-13 PROCEDURE — 99214 OFFICE O/P EST MOD 30 MIN: CPT | Mod: 25,S$PBB,, | Performed by: DERMATOLOGY

## 2019-12-13 NOTE — PROGRESS NOTES
Subjective:       Patient ID:  Suki Macias is a 74 y.o. female who presents for   Chief Complaint   Patient presents with    Skin Check     TBSE    Spot     right forehead x years, raised and rough, no tx     Last OV 12-3-2018  AK treated with cryo  5/2018 SCCIS scalp, s/p imiquimod with resultant patchy alopecia, improved since last visit  This is a high risk patient here to check for the development of new lesions.  Avid golfer, lifelong intense sun exposure    Patient here today for skin check, TBSE  Also c/o a spot right forehead x years, is now raised and rough, no tx    Denies FHX MM      Review of Systems   Constitutional: Negative for fever, chills and fatigue.   Skin: Positive for daily sunscreen use, activity-related sunscreen use and wears hat.   Hematologic/Lymphatic: Bruises/bleeds easily.        Objective:    Physical Exam   Constitutional: She appears well-developed and well-nourished. No distress.   Neurological: She is alert and oriented to person, place, and time. She is not disoriented.   Psychiatric: She has a normal mood and affect.   Skin:   Areas Examined (abnormalities noted in diagram):   Scalp / Hair Palpated and Inspected  Head / Face Inspection Performed  Neck Inspection Performed  Chest / Axilla Inspection Performed  Abdomen Inspection Performed  Genitals / Buttocks / Groin Inspection Performed  Back Inspection Performed  RUE Inspected  LUE Inspection Performed  RLE Inspected  LLE Inspection Performed  Nails and Digits Inspection Performed                       Diagram Legend     Erythematous scaling macule/papule c/w actinic keratosis       Vascular papule c/w angioma      Pigmented verrucoid papule/plaque c/w seborrheic keratosis      Yellow umbilicated papule c/w sebaceous hyperplasia      Irregularly shaped tan macule c/w lentigo     1-2 mm smooth white papules consistent with Milia      Movable subcutaneous cyst with punctum c/w epidermal inclusion cyst      Subcutaneous  movable cyst c/w pilar cyst      Firm pink to brown papule c/w dermatofibroma      Pedunculated fleshy papule(s) c/w skin tag(s)      Evenly pigmented macule c/w junctional nevus     Mildly variegated pigmented, slightly irregular-bordered macule c/w mildly atypical nevus      Flesh colored to evenly pigmented papule c/w intradermal nevus       Pink pearly papule/plaque c/w basal cell carcinoma      Erythematous hyperkeratotic cursted plaque c/w SCC      Surgical scar with no sign of skin cancer recurrence      Open and closed comedones      Inflammatory papules and pustules      Verrucoid papule consistent consistent with wart     Erythematous eczematous patches and plaques     Dystrophic onycholytic nail with subungual debris c/w onychomycosis     Umbilicated papule    Erythematous-base heme-crusted tan verrucoid plaque consistent with inflamed seborrheic keratosis     Erythematous Silvery Scaling Plaque c/w Psoriasis     See annotation      Assessment / Plan:        Actinic keratoses  Cryosurgery Procedure Note    Verbal consent from the patient is obtained and the patient is aware of the precancerous quality and need for treatment of these lesions. Liquid nitrogen cryosurgery is applied to the 1 actinic keratoses, as detailed in the physical exam, to produce a freeze injury. The patient is aware that blisters may form and is instructed on wound care with gentle cleansing and use of vaseline ointment to keep moist until healed. The patient is supplied a handout on cryosurgery and is instructed to call if lesions do not completely resolve.    History of nonmelanoma skin cancer  Area of previous SCCIS (ant scalp) examined. Site well healed with no signs of recurrence.    Total body skin examination performed today including at least 12 points as noted in physical examination. No lesions suspicious for malignancy noted.    Seborrheic keratoses  These are benign inherited growths without a malignant potential.  Reassurance given to patient. No treatment is necessary.     Cherry angioma  This is a benign vascular lesion. Reassurance given. No treatment required.     Solar lentigo  This is a benign hyperpigmented sun induced lesion. Daily sun protection will reduce the number of new lesions. Treatment of these benign lesions are considered cosmetic.    Patient instructed in importance in daily sun protection of at least spf 30. Mineral sunscreen ingredients preferred (Zinc +/- Titanium).   Recommend Elta MD for daily use on face and neck.  Patient encouraged to wear hat for all outdoor exposure.   Also discussed sun avoidance and use of protective clothing.               Follow up in about 1 year (around 12/13/2020).

## 2019-12-13 NOTE — PATIENT INSTRUCTIONS

## 2019-12-23 ENCOUNTER — OFFICE VISIT (OUTPATIENT)
Dept: FAMILY MEDICINE | Facility: CLINIC | Age: 74
End: 2019-12-23
Payer: MEDICARE

## 2019-12-23 VITALS
RESPIRATION RATE: 20 BRPM | BODY MASS INDEX: 21.02 KG/M2 | DIASTOLIC BLOOD PRESSURE: 62 MMHG | HEIGHT: 62 IN | WEIGHT: 114.19 LBS | OXYGEN SATURATION: 79 % | HEART RATE: 96 BPM | SYSTOLIC BLOOD PRESSURE: 116 MMHG

## 2019-12-23 DIAGNOSIS — Z79.899 ENCOUNTER FOR LONG-TERM (CURRENT) USE OF MEDICATIONS: ICD-10-CM

## 2019-12-23 DIAGNOSIS — J01.10 ACUTE NON-RECURRENT FRONTAL SINUSITIS: Primary | ICD-10-CM

## 2019-12-23 PROCEDURE — 99999 PR PBB SHADOW E&M-EST. PATIENT-LVL III: ICD-10-PCS | Mod: PBBFAC,,, | Performed by: FAMILY MEDICINE

## 2019-12-23 PROCEDURE — 99213 OFFICE O/P EST LOW 20 MIN: CPT | Mod: PBBFAC,PN | Performed by: FAMILY MEDICINE

## 2019-12-23 PROCEDURE — 99214 OFFICE O/P EST MOD 30 MIN: CPT | Mod: S$PBB,,, | Performed by: FAMILY MEDICINE

## 2019-12-23 PROCEDURE — 99214 PR OFFICE/OUTPT VISIT, EST, LEVL IV, 30-39 MIN: ICD-10-PCS | Mod: S$PBB,,, | Performed by: FAMILY MEDICINE

## 2019-12-23 PROCEDURE — 1159F MED LIST DOCD IN RCRD: CPT | Mod: ,,, | Performed by: FAMILY MEDICINE

## 2019-12-23 PROCEDURE — 99999 PR PBB SHADOW E&M-EST. PATIENT-LVL III: CPT | Mod: PBBFAC,,, | Performed by: FAMILY MEDICINE

## 2019-12-23 PROCEDURE — 1159F PR MEDICATION LIST DOCUMENTED IN MEDICAL RECORD: ICD-10-PCS | Mod: ,,, | Performed by: FAMILY MEDICINE

## 2019-12-23 RX ORDER — AZITHROMYCIN 250 MG/1
TABLET, FILM COATED ORAL
Qty: 6 TABLET | Refills: 0 | Status: SHIPPED | OUTPATIENT
Start: 2019-12-23 | End: 2020-03-18

## 2019-12-23 RX ORDER — AZELASTINE 1 MG/ML
1 SPRAY, METERED NASAL 2 TIMES DAILY PRN
Qty: 90 ML | Refills: 3 | Status: SHIPPED | OUTPATIENT
Start: 2019-12-23 | End: 2020-06-22 | Stop reason: SDUPTHER

## 2019-12-23 NOTE — PROGRESS NOTES
"Subjective:       Patient ID: Suki Macias is a 74 y.o. female.    Chief Complaint: Follow-up and Sinus Problem    Cough  Last few weeks  Worsening  Associated with fever  Nothing makes better or worse  + sick contacts      Review of Systems   Constitutional: Positive for activity change, appetite change and fever. Negative for chills and fatigue.   HENT: Positive for congestion, ear pain, postnasal drip, rhinorrhea, sinus pressure, sinus pain and sore throat. Negative for dental problem, facial swelling, nosebleeds, trouble swallowing and voice change.    Eyes: Negative for pain, discharge and visual disturbance.   Respiratory: Positive for cough and wheezing. Negative for apnea, chest tightness and shortness of breath.    Cardiovascular: Negative for chest pain and palpitations.   Gastrointestinal: Negative for abdominal pain, blood in stool, constipation and nausea.   Endocrine: Negative for cold intolerance, polydipsia and polyuria.   Genitourinary: Negative for difficulty urinating, enuresis and flank pain.   Musculoskeletal: Negative for arthralgias and back pain.   Skin: Negative for color change.   Allergic/Immunologic: Negative for environmental allergies and immunocompromised state.   Neurological: Negative for dizziness and light-headedness.   Hematological: Negative for adenopathy.   Psychiatric/Behavioral: Negative for agitation, behavioral problems, decreased concentration and dysphoric mood. The patient is not nervous/anxious.    All other systems reviewed and are negative.        Reviewed family, medical, surgical, and social history.    Objective:      /62 (BP Location: Left arm, Patient Position: Sitting, BP Method: Small (Automatic))   Pulse 96   Resp 20   Ht 5' 1.5" (1.562 m)   Wt 51.8 kg (114 lb 3.2 oz)   SpO2 (!) 79%   BMI 21.23 kg/m²   Physical Exam   Constitutional: She is oriented to person, place, and time. She appears well-developed and well-nourished. No distress. "   HENT:   Head: Normocephalic and atraumatic.   Nose: Mucosal edema and rhinorrhea present. Right sinus exhibits maxillary sinus tenderness. Left sinus exhibits maxillary sinus tenderness and frontal sinus tenderness.   Mouth/Throat: Oropharynx is clear and moist. No oropharyngeal exudate.   Eyes: Pupils are equal, round, and reactive to light. Conjunctivae and EOM are normal. No scleral icterus.   Neck: Normal range of motion. Neck supple. No thyromegaly present.   Cardiovascular: Normal rate, regular rhythm and normal heart sounds. Exam reveals no gallop and no friction rub.   No murmur heard.  Pulmonary/Chest: Effort normal. No respiratory distress. She has decreased breath sounds. She has wheezes. She has rhonchi. She has no rales. She exhibits no tenderness.   Abdominal: Soft. Bowel sounds are normal. She exhibits no distension. There is no tenderness. There is no guarding.   Musculoskeletal: Normal range of motion. She exhibits no edema, tenderness or deformity.   Lymphadenopathy:     She has no cervical adenopathy.   Neurological: She is alert and oriented to person, place, and time. She displays normal reflexes. No cranial nerve deficit or sensory deficit. She exhibits normal muscle tone.   Skin: Skin is warm and dry. No rash noted. She is not diaphoretic. No erythema. No pallor.   Psychiatric: She has a normal mood and affect. Her behavior is normal. Judgment and thought content normal.   Nursing note and vitals reviewed.      Assessment:       1. Acute non-recurrent frontal sinusitis    2. Encounter for long-term (current) use of medications        Plan:       Acute non-recurrent frontal sinusitis  -     azithromycin (Z-MARILIN) 250 MG tablet; Take two tablets on day 1, then 1 tablet on days 2-5.  Dispense: 6 tablet; Refill: 0    Encounter for long-term (current) use of medications  -     azelastine (ASTELIN) 137 mcg (0.1 %) nasal spray; 1 spray (137 mcg total) by Nasal route 2 (two) times daily as needed for  Rhinitis.  Dispense: 90 mL; Refill: 3            Risks, benefits, and side effects were discussed with the patient. All questions were answered to the fullest satisfaction of the patient, and pt verbalized understanding and agreement to treatment plan. Pt was to call with any new or worsening symptoms, or present to the ER.

## 2020-01-27 ENCOUNTER — OFFICE VISIT (OUTPATIENT)
Dept: PAIN MEDICINE | Facility: CLINIC | Age: 75
End: 2020-01-27
Payer: MEDICARE

## 2020-01-27 ENCOUNTER — PATIENT OUTREACH (OUTPATIENT)
Dept: ADMINISTRATIVE | Facility: OTHER | Age: 75
End: 2020-01-27

## 2020-01-27 VITALS
DIASTOLIC BLOOD PRESSURE: 87 MMHG | SYSTOLIC BLOOD PRESSURE: 158 MMHG | HEIGHT: 62 IN | WEIGHT: 114 LBS | BODY MASS INDEX: 20.98 KG/M2 | HEART RATE: 80 BPM

## 2020-01-27 DIAGNOSIS — M54.50 CHRONIC BILATERAL LOW BACK PAIN WITHOUT SCIATICA: ICD-10-CM

## 2020-01-27 DIAGNOSIS — G89.29 CHRONIC BILATERAL LOW BACK PAIN WITHOUT SCIATICA: ICD-10-CM

## 2020-01-27 DIAGNOSIS — M47.896 OTHER SPONDYLOSIS, LUMBAR REGION: ICD-10-CM

## 2020-01-27 DIAGNOSIS — G89.4 CHRONIC PAIN DISORDER: Primary | ICD-10-CM

## 2020-01-27 DIAGNOSIS — M51.36 DDD (DEGENERATIVE DISC DISEASE), LUMBAR: Primary | ICD-10-CM

## 2020-01-27 PROCEDURE — 99214 PR OFFICE/OUTPT VISIT, EST, LEVL IV, 30-39 MIN: ICD-10-PCS | Mod: S$PBB,,, | Performed by: PHYSICIAN ASSISTANT

## 2020-01-27 PROCEDURE — 1159F MED LIST DOCD IN RCRD: CPT | Mod: ,,, | Performed by: PHYSICIAN ASSISTANT

## 2020-01-27 PROCEDURE — 99999 PR PBB SHADOW E&M-EST. PATIENT-LVL III: CPT | Mod: PBBFAC,,, | Performed by: PHYSICIAN ASSISTANT

## 2020-01-27 PROCEDURE — 99999 PR PBB SHADOW E&M-EST. PATIENT-LVL III: ICD-10-PCS | Mod: PBBFAC,,, | Performed by: PHYSICIAN ASSISTANT

## 2020-01-27 PROCEDURE — 1159F PR MEDICATION LIST DOCUMENTED IN MEDICAL RECORD: ICD-10-PCS | Mod: ,,, | Performed by: PHYSICIAN ASSISTANT

## 2020-01-27 PROCEDURE — 1125F AMNT PAIN NOTED PAIN PRSNT: CPT | Mod: ,,, | Performed by: PHYSICIAN ASSISTANT

## 2020-01-27 PROCEDURE — 99214 OFFICE O/P EST MOD 30 MIN: CPT | Mod: S$PBB,,, | Performed by: PHYSICIAN ASSISTANT

## 2020-01-27 PROCEDURE — 99213 OFFICE O/P EST LOW 20 MIN: CPT | Mod: PBBFAC,PN | Performed by: PHYSICIAN ASSISTANT

## 2020-01-27 PROCEDURE — 1125F PR PAIN SEVERITY QUANTIFIED, PAIN PRESENT: ICD-10-PCS | Mod: ,,, | Performed by: PHYSICIAN ASSISTANT

## 2020-01-27 RX ORDER — TRAMADOL HYDROCHLORIDE 50 MG/1
50 TABLET ORAL EVERY 8 HOURS PRN
Qty: 90 TABLET | Refills: 1 | Status: SHIPPED | OUTPATIENT
Start: 2020-02-18 | End: 2020-03-19

## 2020-01-27 NOTE — PROGRESS NOTES
Referring Physician: Jese Hernández    PCP: Rambo Mayfield MD    CC: Lower back pain    Interval history:  Suki Macias is a 75 y.o. female who returns for f/u. She reports feeling a 'twinge' in her lower back that has increased in intensity recently. The pain is not radiating into her legs. Sometimes it radiates into the right hip. She denies LE weakness or bowel/bladder dysfucntion. She is still playing golf and fully participating in ADLs, but has noticed her low back pain slightly worsening. She is considering procedures to help. Has had L-ESIs in the past with Dr. Ellis. No records available. No recent imaging available. Pt states she may have records at home. Denies having recent lumbar imaging. Pain is 5/10.    Interval History:  Ms. Macias is a 75 y.o. female with chronic low back pain who returns to our clinic.  Lower back pain remains tolerable with her home exercises as well as some medication.  Pain is unchanged in quality or location. Tramadol 50 mg q 8 h and Celebrex provides moderate benefit. She reports waking up in pain. She is able to perform her ADLs and play golf. She is happy with her pain control and does not desire any interventions.  She has tried CBD but states it has not been helpful..  Pain today is rated 5/10.    Prior HPI:   She is a 71-year-old female referred to us for lower back pain.  Is been present for over 10 years.  She states having constant aching pain in her lower back.  Pain radiates to her right hip at times.  Pain worsens with standing, walking and getting up.  She's had moderate benefit from physical therapy in the past.  She's also had lumbar BLAKE's in the past with moderate benefit.  Pain is currently tolerable with medications.  She takes tramadol 50 mg every 12 hours as needed.  She is able to perform her ADLs with the medication.  She also takes Celebrex with mild benefits.  Tramadol was provided by her PCP until recently.  She states her current PCP is  unwilling to continue her tramadol even though he has prescribed to her for over 2 years.  She denies any weakness.  No bowel bladder changes.  She rates her pain 1/10 today.    ROS:  CONSTITUTIONAL: No fevers, chills, night sweats, wt. loss, appetite changes  SKIN: no rashes or itching  ENT: No headaches, head trauma, vision changes, or eye pain  LYMPH NODES: None noticed   CV: No chest pain, palpitations.   RESP: No shortness of breath, dyspnea on exertion, cough, wheezing, or hemoptysis  GI: No nausea, emesis, diarrhea, constipation, melena, hematochezia, pain.    : No dysuria, hematuria, urgency, or frequency   HEME: No easy bruising, bleeding problems  PSYCHIATRIC: No depression, anxiety, psychosis, hallucinations.  NEURO: No seizures, memory loss, dizziness or difficulty sleeping  MSK: + History of present illness      Past Medical History:   Diagnosis Date    Arthritis     Chronic diarrhea     Hypertension     Hypothyroid     Hypothyroidism, postablative     Squamous cell carcinoma 01/2018    SCCIS anterior scalp, imiquimod     Past Surgical History:   Procedure Laterality Date    BACK SURGERY  1973    BLADDER SUSPENSION  1987    BREAST BIOPSY      COLONOSCOPY  2011    procedure aborted. unable to complete    COLONOSCOPY N/A 5/21/2019    Procedure: sigmoid flex;  Surgeon: Ishaan Mayfield MD;  Location: [facility] END[facility];  Service: Endoscopy;  Laterality: N/A;    HYSTERECTOMY  1985    bso    OOPHORECTOMY      SPINE SURGERY       Family History   Problem Relation Age of Onset    Colon cancer Mother     Thyroid disease Mother     Cancer Mother     Diabetes Mellitus Mother     Kidney disease Mother     Hypertension Mother     Heart disease Mother     Lung cancer Father     Rheum arthritis Father     Cancer Father     Breast cancer Sister     Hypertension Sister     Hyperlipidemia Sister     Lung cancer Brother     Heart failure Brother     Stroke Brother     Cancer Brother     COPD  Brother     Heart disease Brother     Hyperlipidemia Brother     Breast cancer Sister     Osteoarthritis Sister     Hypertension Sister     Hyperlipidemia Sister     Rheum arthritis Daughter     Diabetes Mellitus Maternal Aunt     Rheum arthritis Paternal Aunt     Melanoma Neg Hx     Psoriasis Neg Hx     Lupus Neg Hx     Eczema Neg Hx     Inflammatory bowel disease Neg Hx     Chronic back pain Neg Hx     Asthma Neg Hx     Alcohol abuse Neg Hx      Social History     Socioeconomic History    Marital status:      Spouse name: Not on file    Number of children: Not on file    Years of education: Not on file    Highest education level: Not on file   Occupational History    Not on file   Social Needs    Financial resource strain: Not on file    Food insecurity:     Worry: Not on file     Inability: Not on file    Transportation needs:     Medical: Not on file     Non-medical: Not on file   Tobacco Use    Smoking status: Former Smoker     Last attempt to quit: 1970     Years since quittin.1    Smokeless tobacco: Former User   Substance and Sexual Activity    Alcohol use: Yes     Alcohol/week: 0.0 standard drinks     Frequency: Never     Comment: RARELY    Drug use: No    Sexual activity: Not Currently   Lifestyle    Physical activity:     Days per week: Not on file     Minutes per session: Not on file    Stress: Not on file   Relationships    Social connections:     Talks on phone: Not on file     Gets together: Not on file     Attends Judaism service: Not on file     Active member of club or organization: Not on file     Attends meetings of clubs or organizations: Not on file     Relationship status: Not on file   Other Topics Concern    Are you pregnant or think you may be? Not Asked    Breast-feeding Not Asked   Social History Narrative    Not on file         Medications/Allergies: See med card    Vitals:    20 1514   BP: (!) 158/87   Pulse: 80   Weight: 51.7  "kg (114 lb)   Height: 5' 1.5" (1.562 m)   PainSc:   5         Physical exam:    GENERAL: A and O x3, the patient appears well groomed and is in no acute distress.  Skin: No rashes or obvious lesions  HEENT: normocephalic, atraumatic  CARDIOVASCULAR:  RRR  LUNGS: non labored breathing  ABDOMEN: soft, nontender   UPPER EXTREMITIES: Normal alignment, normal range of motion, no atrophy, no skin changes,  hair growth and nail growth normal and equal bilaterally. No swelling, no tenderness.    LOWER EXTREMITIES:  Normal alignment, normal range of motion, no atrophy, no skin changes,  hair growth and nail growth normal and equal bilaterally. No swelling, no tenderness.    LUMBAR SPINE  Lumbar spine: ROM is full with flexion extension and oblique extension with mild increased pain.    Andry's test causes no increased pain on either side.    Supine straight leg raise is negative bilaterally.    Internal and external rotation of the hip causes no increased pain on either side.  Myofascial exam: No tenderness to palpation across lumbar paraspinous muscles.      MENTAL STATUS: normal orientation, speech, language, and fund of knowledge for social situation.  Emotional state appropriate.    CRANIAL NERVES:  II:  PERRL bilaterally,   III,IV,VI: EOMI.    V:  Facial sensation equal bilaterally  VII:  Facial motor function normal.  VIII:  Hearing equal to finger rub bilaterally  IX/X: Gag normal, palate symmetric  XI:  Shoulder shrug equal, head turn equal  XII:  Tongue midline without fasciculations      MOTOR: Tone and bulk: normal bilateral upper and lower Strength: normal   Delt Bi Tri WE WF     R 5 5 5 5 5 5   L 5 5 5 5 5 5     IP ADD ABD Quad TA Gas HAM  R 5 5 5 5 5 5 5  L 5 5 5 5 5 5 5    SENSATION: Light touch and pinprick intact bilaterally  REFLEXES: normal, symmetric, nonbrisk.  Toes down, no clonus. No hoffmans.  GAIT: normal rise, base, steps, and arm swing.      Imaging:  None available    Assessment:  Ms. Macias" is a 75 y.o. female with low back pain  1. DDD (degenerative disc disease), lumbar    2. Other spondylosis, lumbar region    3. Chronic bilateral low back pain without sciatica       Plan:  1. I have stressed the importance of physical activity and exercise to improve overall health  2.  Lumbar x-ray results reviewed. Discussed MBB workup. She does not wish to do this at this time.   3. Tramadol 50 mg q 8 h prn.   reviewed and consistent. No aberrant behavior. UDS LCV consistent  4.  RTC in 3 months for f/u.   All medication management was performed by Dr. Kashif Starr

## 2020-03-13 ENCOUNTER — LAB VISIT (OUTPATIENT)
Dept: LAB | Facility: HOSPITAL | Age: 75
End: 2020-03-13
Attending: INTERNAL MEDICINE
Payer: MEDICARE

## 2020-03-13 DIAGNOSIS — E89.0 POSTSURGICAL HYPOTHYROIDISM: Primary | ICD-10-CM

## 2020-03-13 DIAGNOSIS — E21.0 PRIMARY HYPERPARATHYROIDISM: ICD-10-CM

## 2020-03-13 DIAGNOSIS — E83.52 HYPERCALCEMIA: ICD-10-CM

## 2020-03-13 LAB
25(OH)D3+25(OH)D2 SERPL-MCNC: 31 NG/ML (ref 30–96)
ANION GAP SERPL CALC-SCNC: 6 MMOL/L (ref 8–16)
BUN SERPL-MCNC: 25 MG/DL (ref 8–23)
CALCIUM SERPL-MCNC: 10 MG/DL (ref 8.7–10.5)
CHLORIDE SERPL-SCNC: 102 MMOL/L (ref 95–110)
CO2 SERPL-SCNC: 27 MMOL/L (ref 23–29)
CREAT SERPL-MCNC: 1 MG/DL (ref 0.5–1.4)
EST. GFR  (AFRICAN AMERICAN): >60 ML/MIN/1.73 M^2
EST. GFR  (NON AFRICAN AMERICAN): 54.9 ML/MIN/1.73 M^2
GLUCOSE SERPL-MCNC: 95 MG/DL (ref 70–110)
POTASSIUM SERPL-SCNC: 4.3 MMOL/L (ref 3.5–5.1)
PTH-INTACT SERPL-MCNC: 122 PG/ML (ref 9–77)
SODIUM SERPL-SCNC: 135 MMOL/L (ref 136–145)
T4 FREE SERPL-MCNC: 1.38 NG/DL (ref 0.71–1.51)
TSH SERPL DL<=0.005 MIU/L-ACNC: 2.13 UIU/ML (ref 0.34–5.6)

## 2020-03-13 PROCEDURE — 80048 BASIC METABOLIC PNL TOTAL CA: CPT

## 2020-03-13 PROCEDURE — 83970 ASSAY OF PARATHORMONE: CPT

## 2020-03-13 PROCEDURE — 84443 ASSAY THYROID STIM HORMONE: CPT

## 2020-03-13 PROCEDURE — 82306 VITAMIN D 25 HYDROXY: CPT

## 2020-03-13 PROCEDURE — 84439 ASSAY OF FREE THYROXINE: CPT

## 2020-03-13 PROCEDURE — 36415 COLL VENOUS BLD VENIPUNCTURE: CPT

## 2020-03-18 ENCOUNTER — PATIENT OUTREACH (OUTPATIENT)
Dept: ADMINISTRATIVE | Facility: OTHER | Age: 75
End: 2020-03-18

## 2020-03-18 ENCOUNTER — OFFICE VISIT (OUTPATIENT)
Dept: GASTROENTEROLOGY | Facility: CLINIC | Age: 75
End: 2020-03-18
Payer: MEDICARE

## 2020-03-18 VITALS
BODY MASS INDEX: 20.61 KG/M2 | WEIGHT: 112 LBS | RESPIRATION RATE: 18 BRPM | HEIGHT: 62 IN | SYSTOLIC BLOOD PRESSURE: 163 MMHG | DIASTOLIC BLOOD PRESSURE: 86 MMHG | HEART RATE: 69 BPM

## 2020-03-18 DIAGNOSIS — K57.30 DIVERTICULOSIS OF LARGE INTESTINE WITHOUT HEMORRHAGE: ICD-10-CM

## 2020-03-18 DIAGNOSIS — K21.9 GASTROESOPHAGEAL REFLUX DISEASE WITHOUT ESOPHAGITIS: ICD-10-CM

## 2020-03-18 DIAGNOSIS — E05.90 HYPERTHYROIDISM: Primary | ICD-10-CM

## 2020-03-18 DIAGNOSIS — E83.52 HYPERCALCEMIA: ICD-10-CM

## 2020-03-18 DIAGNOSIS — K44.9 HIATAL HERNIA: ICD-10-CM

## 2020-03-18 PROCEDURE — 99999 PR PBB SHADOW E&M-EST. PATIENT-LVL IV: ICD-10-PCS | Mod: PBBFAC,,, | Performed by: INTERNAL MEDICINE

## 2020-03-18 PROCEDURE — 99999 PR PBB SHADOW E&M-EST. PATIENT-LVL IV: CPT | Mod: PBBFAC,,, | Performed by: INTERNAL MEDICINE

## 2020-03-18 PROCEDURE — 99213 PR OFFICE/OUTPT VISIT, EST, LEVL III, 20-29 MIN: ICD-10-PCS | Mod: S$PBB,,, | Performed by: INTERNAL MEDICINE

## 2020-03-18 PROCEDURE — 99213 OFFICE O/P EST LOW 20 MIN: CPT | Mod: S$PBB,,, | Performed by: INTERNAL MEDICINE

## 2020-03-18 PROCEDURE — 99214 OFFICE O/P EST MOD 30 MIN: CPT | Mod: PBBFAC,PN | Performed by: INTERNAL MEDICINE

## 2020-03-18 NOTE — PROGRESS NOTES
Subjective:       Patient ID: Suki Macias is a 75 y.o. female.    Chief Complaint: Follow-up (6m-diverticulosis, hypercalcemia, diarrhea)    She states in the evening if she takes half of her medicines that she will have burping belching and that she will have to sit upright.  She is taking her fissure or in the evening.  She denies dysphagia aspiration hematemesis hematochezia jaundice or bleeding.  The diarrhea is resolved.  She has history of hiatal hernia gastroesophageal reflux and sometimes she does not take her omeprazole and does have pyrosis.  She has history of diverticulosis and hemorrhoids but her bowel function is normal. She has ingesting a regular diet.  She avoids the offending foods.      Allergies:  Review of patient's allergies indicates:   Allergen Reactions    Codeine Other (See Comments)     Hallucinations    Crestor [rosuvastatin]     Doxycycline Diarrhea    Lipitor [atorvastatin]        Medications:    Current Outpatient Medications:     aspirin (ECOTRIN) 81 MG EC tablet, Take 81 mg by mouth once daily., Disp: , Rfl:     azelastine (ASTELIN) 137 mcg (0.1 %) nasal spray, 1 spray (137 mcg total) by Nasal route 2 (two) times daily as needed for Rhinitis., Disp: 90 mL, Rfl: 3    BIOTIN ORAL, Take 5,000 mcg by mouth once daily., Disp: , Rfl:     celecoxib (CELEBREX) 200 MG capsule, Take 1 capsule (200 mg total) by mouth 2 (two) times daily as needed., Disp: 180 capsule, Rfl: 3    co-enzyme Q-10 30 mg capsule, Take 30 mg by mouth 3 (three) times daily., Disp: , Rfl:     fenofibric acid (FIBRICOR) 135 mg CpDR, TAKE 1 CAPSULE ONCE DAILY WITH EVENING MEAL FOR CHOLESTEROL, Disp: 90 capsule, Rfl: 3    fish oil-omega-3 fatty acids 300-1,000 mg capsule, Take by mouth once daily., Disp: , Rfl:     lisinopril (PRINIVIL,ZESTRIL) 2.5 MG tablet, Take 1 tablet (2.5 mg total) by mouth once daily., Disp: 90 tablet, Rfl: 3    OMEPRAZOLE ORAL, Take by mouth., Disp: , Rfl:     PREMARIN 0.9 mg  Tab, Take 1 tablet (0.9 mg total) by mouth once daily., Disp: 90 tablet, Rfl: 3    SYNTHROID 88 mcg tablet, Take 88 mcg by mouth before breakfast. , Disp: , Rfl:     traMADol (ULTRAM) 50 mg tablet, Take 1 tablet (50 mg total) by mouth every 8 (eight) hours as needed for Pain (medically necessary for > 7 days for chronic pain)., Disp: 90 tablet, Rfl: 1    Past Medical History:   Diagnosis Date    Arthritis     Chronic diarrhea     Hypertension     Hypothyroid     Hypothyroidism, postablative     Squamous cell carcinoma 01/2018    SCCIS anterior scalp, imiquimod       Past Surgical History:   Procedure Laterality Date    BACK SURGERY  1973    BLADDER SUSPENSION  1987    BREAST BIOPSY      COLONOSCOPY  2011    procedure aborted. unable to complete    COLONOSCOPY N/A 5/21/2019    Procedure: sigmoid flex;  Surgeon: Ishaan Mayfield MD;  Location: [facility];  Service: Endoscopy;  Laterality: N/A;    HYSTERECTOMY  1985    bso    OOPHORECTOMY      SPINE SURGERY           Review of Systems   Constitutional: Negative for appetite change, fever and unexpected weight change.   HENT: Negative for trouble swallowing.         No jaundice.   Respiratory: Negative for cough, shortness of breath and wheezing.         She is a former cigarette smoker.  She denies significant coughing sputum production.  She denies aspiration hemoptysis or dyspnea on exertion   Cardiovascular: Negative for chest pain.        She denies exertional chest pain or rhythm disturbance.  She states her hyperlipidemia hypertension well controlled.   Gastrointestinal: Negative for abdominal distention, abdominal pain, anal bleeding, blood in stool, constipation, diarrhea and nausea.   Endocrine:        She is concerned about the elevated PTH.  She had a bone mineral density test which showed she had decrease calcium.  She states her endocrinologist is only watching her the labs ..  She is concerned that she needs an other endocrinologist to  evaluate her.  The Synthroid was decreased 88mcg per day.  She has a history of radiation for thyroid disease years ago.   Musculoskeletal: Positive for arthralgias and back pain. Negative for neck pain.        He she has had back   Skin: Negative for pallor and rash.   Neurological: Negative for dizziness, seizures, syncope, speech difficulty, weakness and numbness.   Hematological: Negative for adenopathy.   Psychiatric/Behavioral: Negative for confusion.       Objective:      Physical Exam   Constitutional: She is oriented to person, place, and time. She appears well-developed and well-nourished.   Well-nourished well-hydrated nonicteric white female.  She is mainly x3.  She can relate her history and answer questions appropriately.  She has a normal hand .   HENT:   Head: Normocephalic.   Eyes: Pupils are equal, round, and reactive to light. EOM are normal.   Neck: Normal range of motion. Neck supple.   Cardiovascular: Normal rate, regular rhythm and normal heart sounds.   Pulmonary/Chest: Effort normal and breath sounds normal.   Abdominal: Soft. Bowel sounds are normal. She exhibits no distension and no mass. There is no tenderness. There is no rebound and no guarding. No hernia.   Musculoskeletal: Normal range of motion.   She can ambulate normally.  She can go from the sitting to the standing position without difficulty.   Neurological: She is alert and oriented to person, place, and time. No cranial nerve deficit.   Skin: Skin is warm and dry.   Psychiatric: She has a normal mood and affect. Her behavior is normal.   Vitals reviewed.        Plan:       Hyperthyroidism  -     Ambulatory referral/consult to Endocrinology; Future; Expected date: 03/25/2020    Diverticulosis of large intestine without hemorrhage    Gastroesophageal reflux disease without esophagitis    Hypercalcemia    Hiatal hernia     She will continue her reflux regimen.  She takes omeprazole daily basis.  She continues her other  medications.  Medications the would cause reflux such as the initial will be taking earlier in the day with meals.  She will be referred to an endocrinologist for 2nd opinion about her hyper parathyroidism she continues her bowel program with additional fiber.  She continues her vitamins and minerals.

## 2020-03-18 NOTE — LETTER
March 18, 2020      Fiordaliza Martinez, LAUREN-C  [address]  [place]  [place] [address]           [facility]  [address]  [place] [address]  Phone: [phone]  Fax: [phone]          Patient: Suki Mcaias   MR Number: [id number]   YOB: 1945   Date of Visit: 3/18/2020       Dear Fiordaliza Martinez:    Thank you for referring Suki Macias to me for evaluation. Attached you will find relevant portions of my assessment and plan of care.    If you have questions, please do not hesitate to call me. I look forward to following Suki Macias along with you.    Sincerely,    Ishaan Mayfield MD    Enclosure  CC:  No Recipients    If you would like to receive this communication electronically, please contact [email or web address] or [phone] to request more information on [employer] access.    For providers and/or their staff who would like to refer a patient to [facility], please contact us through our one-stop-shop provider referral line, [facility], at [phone].    If you feel you have received this communication in error or would no longer like to receive these types of communications, please e-mail [email or web address]

## 2020-03-18 NOTE — PATIENT INSTRUCTIONS
She will be referred for a 2nd opinion to an endocrinologist for the elevated PTH levels.  In the interval she continues her current medications including the omeprazole on a daily basis.  She will try to avoid or intake the 2-3 hours of going to sleep.  Additionally she will try taker fish oil capsules earlier in the day.

## 2020-03-19 ENCOUNTER — OFFICE VISIT (OUTPATIENT)
Dept: ENDOCRINOLOGY | Facility: CLINIC | Age: 75
End: 2020-03-19
Payer: MEDICARE

## 2020-03-19 VITALS
DIASTOLIC BLOOD PRESSURE: 82 MMHG | SYSTOLIC BLOOD PRESSURE: 138 MMHG | BODY MASS INDEX: 20.9 KG/M2 | HEART RATE: 69 BPM | WEIGHT: 112.44 LBS

## 2020-03-19 DIAGNOSIS — E21.3 HYPERPARATHYROIDISM: Primary | ICD-10-CM

## 2020-03-19 DIAGNOSIS — E89.0 POSTABLATIVE HYPOTHYROIDISM: ICD-10-CM

## 2020-03-19 DIAGNOSIS — E05.90 HYPERTHYROIDISM: ICD-10-CM

## 2020-03-19 DIAGNOSIS — M85.852 OSTEOPENIA OF NECK OF LEFT FEMUR: ICD-10-CM

## 2020-03-19 PROCEDURE — 99213 OFFICE O/P EST LOW 20 MIN: CPT | Mod: PBBFAC,PO | Performed by: INTERNAL MEDICINE

## 2020-03-19 PROCEDURE — 99204 OFFICE O/P NEW MOD 45 MIN: CPT | Mod: S$PBB,,, | Performed by: INTERNAL MEDICINE

## 2020-03-19 PROCEDURE — 99999 PR PBB SHADOW E&M-EST. PATIENT-LVL III: ICD-10-PCS | Mod: PBBFAC,,, | Performed by: INTERNAL MEDICINE

## 2020-03-19 PROCEDURE — 99999 PR PBB SHADOW E&M-EST. PATIENT-LVL III: CPT | Mod: PBBFAC,,, | Performed by: INTERNAL MEDICINE

## 2020-03-19 PROCEDURE — 99204 PR OFFICE/OUTPT VISIT, NEW, LEVL IV, 45-59 MIN: ICD-10-PCS | Mod: S$PBB,,, | Performed by: INTERNAL MEDICINE

## 2020-03-19 NOTE — ASSESSMENT & PLAN NOTE
Suspect primary hyperparathyroidism   - last vit D 31, encourage pt to take OTC vit D daily   - had 24 hr urine calcium, low, but volume low as well no Cr checked. Will repeat in a few months   - had DXA with osteopenia. Normal forearm. FRAX not indicating treatment needed at this time  Discussed indications for surgery if primary hyperparathyroidism proven.   - has slightly low GFR which could qualify her. Otherwise no stones, calcium not too high, no fractures/osteoporosis so the GFR is her main potential indication.   - will get US  Avoid dehydration, excessive calcium supplementation and meds (HCTZ) that can worsen hypercalcemia.  Follow up after results reviewed

## 2020-03-19 NOTE — ASSESSMENT & PLAN NOTE
Hx Graves disease, s/p RAIA decades ago, now hypothyroid   - on synthroid 88, clinically euthyroid   - recheck labs in a few months, adjust dose if needed. Avoid biotin 5 days before labs

## 2020-03-19 NOTE — ASSESSMENT & PLAN NOTE
Seen on last DXA   - no falls/fractures.   - FRAX in HPI   - encourage pt to take vit D   - continue to monitor

## 2020-03-19 NOTE — PROGRESS NOTES
Subjective:      Chief Complaint: Hyperparathyroidism    HPI: Suki Macias is a 75 y.o. female who is here for an initial evaluation for hyperparathyroidism.    Reports GI issues about 1.5 years ago. On steroids, didn't get better. Saw somebody else who found high calcium, prompting additional workup. Saw endocrine (Dr. Schmitz) to eval calcium, parathyroid.    Had elevated PTH for a while. Since at least 2018.   Calcium mildly elevated off and on, up to 11.6 at the highest, though last Ca normal.    Last labs:  Lab Results   Component Value Date    CALCIUM 10.0 03/13/2020    ALBUMIN 4.0 04/22/2019    CREATININE 1.0 03/13/2020    LENVGLGL29KI 31 03/13/2020    .0 (H) 03/13/2020   Had 24 hr urine Ca of 140 (volume only 600, no creatinine at that time).    DXA 11/2019: normal   Forearm tscore 0.8  DXA 10/2019: osteopenia. FRAX 11% major, 2.7% hip.   spine tscore +2.0   Left hip -1.8    Was on vit D in the past, then told to stop, now told to get back on it.    Pt also has hx hyperthyroidism s/p radiation about 35+years ago, had some issues with getting dose regulated.  Lab Results   Component Value Date    TSH 2.130 03/13/2020    H1ZWXCU 16.3 (H) 06/19/2017    FREET4 1.38 03/13/2020     On 88 mcg/day Synthroid (brand medication).    Today, pt reports feeling okay overall.  Dealing with decreased energy, muscle cramps on occasion, decreased strength.    No falls, no fractures.  No kidney stones.  Not on thiazides.  Is taking biotin.  Not smoking.  No FH hip fractures. +FH osteoporosis (patient's mother)  Doesn't get a lot of calcium in the diet.      Reviewed past medical, family, social history and updated as appropriate.    Review of Systems   Constitutional: Positive for fatigue. Negative for unexpected weight change.   HENT: Negative for trouble swallowing.    Eyes: Positive for visual disturbance (decreased vision, chronic, no changes lately. hx Graves eye disease).   Respiratory: Negative for  shortness of breath.    Cardiovascular: Positive for palpitations (rare, off and on for years).   Gastrointestinal: Negative for constipation and diarrhea.   Endocrine: Negative for cold intolerance and heat intolerance.   Genitourinary: Negative for frequency.   Neurological: Positive for dizziness (if getting up too quickly) and weakness (sluggish).   Psychiatric/Behavioral: Positive for sleep disturbance (sleeps a lot).     Objective:     Vitals:    03/19/20 1311   BP: 138/82   Pulse: 69     BP Readings from Last 5 Encounters:   03/19/20 138/82   03/18/20 (!) 163/86   01/27/20 (!) 158/87   12/23/19 116/62   11/06/19 (!) 173/72     Physical Exam   Constitutional: She is oriented to person, place, and time. She appears well-developed and well-nourished.   HENT:   Head: Normocephalic and atraumatic.   Eyes: EOM are normal.   Neck: Normal range of motion. Neck supple. No thyromegaly present.   Cardiovascular: Normal rate and regular rhythm.   No murmur heard.  Pulmonary/Chest: Effort normal and breath sounds normal.   Abdominal: Soft. She exhibits no distension and no mass. There is no tenderness.   Musculoskeletal: Normal range of motion. She exhibits no edema or tenderness.   Neurological: She is alert and oriented to person, place, and time.   Psychiatric: She has a normal mood and affect. Judgment normal.   Vitals reviewed.    Wt Readings from Last 5 Encounters:   03/19/20 1311 51 kg (112 lb 7 oz)   03/18/20 1022 50.8 kg (112 lb)   01/27/20 1514 51.7 kg (114 lb)   12/23/19 1040 51.8 kg (114 lb 3.2 oz)   11/06/19 0902 48.1 kg (106 lb)     Lab Results   Component Value Date    HGBA1C 5.7 (H) 04/22/2019     Lab Results   Component Value Date    CHOL 275 (H) 04/22/2019    CHOL 228 (H) 10/29/2018    HDL 74 04/22/2019    HDL 72 10/29/2018    LDLCALC 157 (H) 04/22/2019    LDLCALC 128 (H) 10/29/2018    TRIG 271 (H) 04/22/2019    TRIG 160 (H) 10/29/2018    CHOLHDL 3.7 04/22/2019    CHOLHDL 3.2 10/29/2018     Lab Results    Component Value Date     (L) 03/13/2020    K 4.3 03/13/2020     03/13/2020    CO2 27 03/13/2020    GLU 95 03/13/2020    BUN 25 (H) 03/13/2020    CREATININE 1.0 03/13/2020    CALCIUM 10.0 03/13/2020    PROT 6.1 04/22/2019    ALBUMIN 4.0 04/22/2019    BILITOT 0.3 04/22/2019    ALKPHOS 51 04/22/2019    AST 13 04/22/2019    ALT 10 04/22/2019    ANIONGAP 6 (L) 03/13/2020    ESTGFRAFRICA >60.0 03/13/2020    EGFRNONAA 54.9 (A) 03/13/2020    TSH 2.130 03/13/2020      Assessment/Plan:     Hypothyroid  Hx Graves disease, s/p RAIA decades ago, now hypothyroid   - on synthroid 88, clinically euthyroid   - recheck labs in a few months, adjust dose if needed. Avoid biotin 5 days before labs    Hyperparathyroidism  Suspect primary hyperparathyroidism   - last vit D 31, encourage pt to take OTC vit D daily   - had 24 hr urine calcium, low, but volume low as well no Cr checked. Will repeat in a few months   - had DXA with osteopenia. Normal forearm. FRAX not indicating treatment needed at this time  Discussed indications for surgery if primary hyperparathyroidism proven.   - has slightly low GFR which could qualify her. Otherwise no stones, calcium not too high, no fractures/osteoporosis so the GFR is her main potential indication.   - will get US  Avoid dehydration, excessive calcium supplementation and meds (HCTZ) that can worsen hypercalcemia.  Follow up after results reviewed    Osteopenia of neck of left femur  Seen on last DXA   - no falls/fractures.   - FRAX in HPI   - encourage pt to take vit D   - continue to monitor        Follow up in about 4 months (around 7/19/2020) for lab review, imaging review, further monitoring.

## 2020-03-19 NOTE — PATIENT INSTRUCTIONS
For the parathyroid:     It seems like you may have primary hyperparathyroidism.    - Potentially this would be treated by a surgery. Reasons for surgery are: Kidney stones, osteoporosis, very high calcium, kidney disease.     Alternatively, the urine calcium was fairly low which can be associated with a benign process called familial hypocalcemic hypercalcemia.    Need some additional evaluation: Repeat 24 hour urine, but after getting 600 mg of calcium twice a day (either with over the counter supplement or with diet. Each serving of calcium-containing foods is about 300 mg each).  As well, repeat PTH and calcium after stopping biotin for 5 days.

## 2020-03-19 NOTE — LETTER
March 19, 2020      Ishaan Mayfield MD  [address] [facility] [place] [address]           [place] [facility]Di[facility]  [address] [place] [address]  Phone: [phone]          Patient: Suki Macias   MR Number: [id number]   YOB: 1945   Date of Visit: 3/19/2020       Dear Dr. Ishaan Mayfield:    Thank you for referring Suki Macias to me for evaluation. Attached you will find relevant portions of my assessment and plan of care.    If you have questions, please do not hesitate to call me. I look forward to following Suki Macias along with you.    Sincerely,    Aman Sood MD    Enclosure  CC:  No Recipients    If you would like to receive this communication electronically, please contact [email or web address] or [phone] to request more information on Phone2Action Link access.    For providers and/or their staff who would like to refer a patient to [facility], please contact us through our one-stop-shop provider referral line, [facility] , at [phone].    If you feel you have received this communication in error or would no longer like to receive these types of communications, please e-mail [email or web address]

## 2020-03-24 ENCOUNTER — PATIENT MESSAGE (OUTPATIENT)
Dept: PAIN MEDICINE | Facility: CLINIC | Age: 75
End: 2020-03-24

## 2020-03-24 ENCOUNTER — PATIENT OUTREACH (OUTPATIENT)
Dept: ADMINISTRATIVE | Facility: OTHER | Age: 75
End: 2020-03-24

## 2020-03-27 DIAGNOSIS — I10 ESSENTIAL HYPERTENSION: ICD-10-CM

## 2020-03-27 RX ORDER — LISINOPRIL 2.5 MG/1
TABLET ORAL
Qty: 90 TABLET | Refills: 3 | Status: SHIPPED | OUTPATIENT
Start: 2020-03-27 | End: 2021-04-26

## 2020-03-30 ENCOUNTER — PATIENT MESSAGE (OUTPATIENT)
Dept: PAIN MEDICINE | Facility: CLINIC | Age: 75
End: 2020-03-30

## 2020-03-30 ENCOUNTER — OFFICE VISIT (OUTPATIENT)
Dept: PAIN MEDICINE | Facility: CLINIC | Age: 75
End: 2020-03-30
Payer: MEDICARE

## 2020-03-30 ENCOUNTER — PATIENT OUTREACH (OUTPATIENT)
Dept: ADMINISTRATIVE | Facility: OTHER | Age: 75
End: 2020-03-30

## 2020-03-30 DIAGNOSIS — M47.896 OTHER SPONDYLOSIS, LUMBAR REGION: ICD-10-CM

## 2020-03-30 DIAGNOSIS — M51.36 DDD (DEGENERATIVE DISC DISEASE), LUMBAR: Primary | ICD-10-CM

## 2020-03-30 DIAGNOSIS — G89.29 CHRONIC BILATERAL LOW BACK PAIN WITHOUT SCIATICA: ICD-10-CM

## 2020-03-30 DIAGNOSIS — M54.50 CHRONIC BILATERAL LOW BACK PAIN WITHOUT SCIATICA: ICD-10-CM

## 2020-03-30 PROCEDURE — 99214 OFFICE O/P EST MOD 30 MIN: CPT | Mod: 95,,, | Performed by: PHYSICIAN ASSISTANT

## 2020-03-30 PROCEDURE — 99214 PR OFFICE/OUTPT VISIT, EST, LEVL IV, 30-39 MIN: ICD-10-PCS | Mod: 95,,, | Performed by: PHYSICIAN ASSISTANT

## 2020-03-30 RX ORDER — TRAMADOL HYDROCHLORIDE 50 MG/1
50 TABLET ORAL EVERY 8 HOURS PRN
Qty: 90 TABLET | Refills: 0 | Status: SHIPPED | OUTPATIENT
Start: 2020-03-30 | End: 2020-04-27 | Stop reason: SDUPTHER

## 2020-03-30 NOTE — PROGRESS NOTES
Referring Physician: No ref. provider found    PCP: Rambo Mayfield MD    CC: Lower back pain  Visit type: Virtual visit with synchronous audio and video  Total time spent with patient: 25 minutes  Each patient to whom he or she provides medical services by telemedicine is:  (1) informed of the relationship between the physician and patient and the respective role of any other health care provider with respect to management of the patient; and (2) notified that he or she may decline to receive medical services by telemedicine and may withdraw from such care at any time.    Interval history:  Suki Macias is a 75 y.o. female who returns for f/u. She reports feeling a 'twinge' in her lower back that has increased in intensity recently. The pain is not radiating into her legs. Sometimes it radiates into the right hip. She denies LE weakness or bowel/bladder dysfucntion. She is still playing golf and fully participating in ADLs, but has noticed her low back pain slightly worsening. She is considering procedures to help. Has had L-ESIs in the past with Dr. Ellis. No records available. No recent imaging available. Pt states she may have records at home. Denies having recent lumbar imaging. Pain is 5/10.    Interval History:  Ms. Macias is a 75 y.o. female with chronic low back pain who returns to our clinic.  Lower back pain remains tolerable with her home exercises as well as some medication.  Pain is unchanged in quality or location. Tramadol 50 mg q 8 h and Celebrex provides moderate benefit. She reports waking up in pain. She is able to perform her ADLs and play golf. She is happy with her pain control and does not desire any interventions.  She has tried CBD but states it has not been helpful..  Pain today is rated 5/10.    Prior HPI:   She is a 71-year-old female referred to us for lower back pain.  Is been present for over 10 years.  She states having constant aching pain in her lower back.  Pain radiates to  her right hip at times.  Pain worsens with standing, walking and getting up.  She's had moderate benefit from physical therapy in the past.  She's also had lumbar BLAKE's in the past with moderate benefit.  Pain is currently tolerable with medications.  She takes tramadol 50 mg every 12 hours as needed.  She is able to perform her ADLs with the medication.  She also takes Celebrex with mild benefits.  Tramadol was provided by her PCP until recently.  She states her current PCP is unwilling to continue her tramadol even though he has prescribed to her for over 2 years.  She denies any weakness.  No bowel bladder changes.  She rates her pain 1/10 today.    ROS:  CONSTITUTIONAL: No fevers, chills, night sweats, wt. loss, appetite changes  SKIN: no rashes or itching  ENT: No headaches, head trauma, vision changes, or eye pain  LYMPH NODES: None noticed   CV: No chest pain, palpitations.   RESP: No shortness of breath, dyspnea on exertion, cough, wheezing, or hemoptysis  GI: No nausea, emesis, diarrhea, constipation, melena, hematochezia, pain.    : No dysuria, hematuria, urgency, or frequency   HEME: No easy bruising, bleeding problems  PSYCHIATRIC: No depression, anxiety, psychosis, hallucinations.  NEURO: No seizures, memory loss, dizziness or difficulty sleeping  MSK: + History of present illness      Past Medical History:   Diagnosis Date    Arthritis     Chronic diarrhea     Hypertension     Hypothyroid     Hypothyroidism, postablative     Squamous cell carcinoma 01/2018    SCCIS anterior scalp, imiquimod     Past Surgical History:   Procedure Laterality Date    BACK SURGERY  1973    BLADDER SUSPENSION  1987    BREAST BIOPSY      COLONOSCOPY  2011    procedure aborted. unable to complete    COLONOSCOPY N/A 5/21/2019    Procedure: sigmoid flex;  Surgeon: Ishaan Mayfield MD;  Location: [facility];  Service: Endoscopy;  Laterality: N/A;    HYSTERECTOMY  1985    bso    OOPHORECTOMY      SPINE SURGERY        Family History   Problem Relation Age of Onset    Colon cancer Mother     Thyroid disease Mother     Cancer Mother     Diabetes Mellitus Mother     Kidney disease Mother     Hypertension Mother     Heart disease Mother     Lung cancer Father     Rheum arthritis Father     Cancer Father     Breast cancer Sister     Hypertension Sister     Hyperlipidemia Sister     Lung cancer Brother     Heart failure Brother     Stroke Brother     Cancer Brother     COPD Brother     Heart disease Brother     Hyperlipidemia Brother     Breast cancer Sister     Osteoarthritis Sister     Hypertension Sister     Hyperlipidemia Sister     Rheum arthritis Daughter     Diabetes Mellitus Maternal Aunt     Rheum arthritis Paternal Aunt     Melanoma Neg Hx     Psoriasis Neg Hx     Lupus Neg Hx     Eczema Neg Hx     Inflammatory bowel disease Neg Hx     Chronic back pain Neg Hx     Asthma Neg Hx     Alcohol abuse Neg Hx      Social History     Socioeconomic History    Marital status:      Spouse name: Not on file    Number of children: Not on file    Years of education: Not on file    Highest education level: Not on file   Occupational History    Not on file   Social Needs    Financial resource strain: Not on file    Food insecurity:     Worry: Not on file     Inability: Not on file    Transportation needs:     Medical: Not on file     Non-medical: Not on file   Tobacco Use    Smoking status: Former Smoker     Last attempt to quit: 1970     Years since quittin.2    Smokeless tobacco: Former User   Substance and Sexual Activity    Alcohol use: Yes     Alcohol/week: 0.0 standard drinks     Frequency: Never     Comment: RARELY    Drug use: No    Sexual activity: Not Currently   Lifestyle    Physical activity:     Days per week: Not on file     Minutes per session: Not on file    Stress: Not on file   Relationships    Social connections:     Talks on phone: Not on file     Gets  together: Not on file     Attends Faith service: Not on file     Active member of club or organization: Not on file     Attends meetings of clubs or organizations: Not on file     Relationship status: Not on file   Other Topics Concern    Are you pregnant or think you may be? Not Asked    Breast-feeding Not Asked   Social History Narrative    Not on file         Medications/Allergies: See med card    Physical exam:    GENERAL: A and O x3, the patient appears well groomed and is in no acute distress.  Skin: No rashes or obvious lesions  HEENT: normocephalic, atraumatic  LUNGS: non distended  ABDOMEN: soft, nontender   UPPER EXTREMITIES: normal range of motion  LOWER EXTREMITIES:  normal range of motion  LUMBAR SPINE  Lumbar spine: ROM is full with flexion extension and oblique extension with mild increased pain.      MENTAL STATUS: normal orientation, speech, language, and fund of knowledge for social situation.  Emotional state appropriate.    CRANIAL NERVES:  II:  PERRL bilaterally,   III,IV,VI: EOMI.    V:  Facial sensation equal bilaterally  VII:  Facial motor function normal.  VIII:  Hearing equal to finger rub bilaterally  IX/X: Gag normal, palate symmetric  XI:  Shoulder shrug equal, head turn equal  XII:  Tongue midline without fasciculations      MOTOR: Tone and bulk: normal bilateral upper and lower Strength: normal   Delt Bi Tri WE WF     R 5 5 5 5 5 5   L 5 5 5 5 5 5     IP ADD ABD Quad TA Gas HAM  R 5 5 5 5 5 5 5  L 5 5 5 5 5 5 5    SENSATION: Light touch and pinprick intact bilaterally  REFLEXES: normal, symmetric, nonbrisk.  Toes down, no clonus. No hoffmans.  GAIT: normal rise, base, steps, and arm swing.      Imaging:  None available    Assessment:  Ms. Macias is a 75 y.o. female with low back pain  1. DDD (degenerative disc disease), lumbar    2. Other spondylosis, lumbar region    3. Chronic bilateral low back pain without sciatica         Plan:  1. I have stressed the importance of  physical activity and exercise to improve overall health  2.  Discussed MBB workup  In the future  3. Tramadol 50 mg q 8 h prn.   reviewed and consistent. No aberrant behavior. Previous UDS consistent  4.  Virtual visit 1 month  All medication management was performed by Dr. Kashif Starr

## 2020-04-17 LAB
6MAM UR QL: NOT DETECTED
7AMINOCLONAZEPAM UR QL: NOT DETECTED
A-OH ALPRAZ UR QL: NOT DETECTED
ALPRAZ UR QL: NOT DETECTED
AMPHET UR QL SCN: NOT DETECTED
BARBITURATES UR QL: NOT DETECTED
BUPRENORPHINE UR QL: NOT DETECTED
BZE UR QL: NOT DETECTED
CARBOXYTHC UR QL: NOT DETECTED
CARISOPRODOL UR QL: NOT DETECTED
CLONAZEPAM UR QL: NOT DETECTED
CODEINE UR QL: NOT DETECTED
CREAT UR-MCNC: 212 MG/DL (ref 20–400)
DIAZEPAM UR QL: NOT DETECTED
ETHYL GLUCURONIDE UR QL: NOT DETECTED
FENTANYL UR QL: NOT DETECTED
HYDROCODONE UR QL: NOT DETECTED
HYDROMORPHONE UR QL: NOT DETECTED
LORAZEPAM UR QL: NOT DETECTED
MDA UR QL: NOT DETECTED
MDEA UR QL: NOT DETECTED
MDMA UR QL: NOT DETECTED
ME-PHENIDATE UR QL: NOT DETECTED
MEPERIDINE UR QL: NOT DETECTED
METHADONE UR QL: NOT DETECTED
METHAMPHET UR QL: NOT DETECTED
MIDAZOLAM UR QL SCN: NOT DETECTED
MORPHINE UR QL: NOT DETECTED
NORBUPRENORPHINE UR QL CFM: NOT DETECTED
NORDIAZEPAM UR QL: NOT DETECTED
NORFENTANYL UR QL: NOT DETECTED
NORHYDROCODONE UR QL CFM: NOT DETECTED
NOROXYCODONE UR QL CFM: NOT DETECTED
NOROXYMORPHONE: NOT DETECTED
OXAZEPAM UR QL: NOT DETECTED
OXYCODONE UR QL: NOT DETECTED
OXYMORPHONE UR QL: NOT DETECTED
PATHOLOGY STUDY: NORMAL
PCP UR QL: NOT DETECTED
PHENTERMINE UR QL: NOT DETECTED
PROPOXYPH UR QL: NOT DETECTED
SERVICE CMNT-IMP: NORMAL
TAPENTADOL UR QL SCN: NOT DETECTED
TAPENTADOL-O-SULF: NOT DETECTED
TEMAZEPAM UR QL: NOT DETECTED
TRAMADOL UR QL: PRESENT
ZOLPIDEM UR QL: NOT DETECTED

## 2020-04-23 ENCOUNTER — PATIENT OUTREACH (OUTPATIENT)
Dept: ADMINISTRATIVE | Facility: OTHER | Age: 75
End: 2020-04-23

## 2020-04-27 ENCOUNTER — OFFICE VISIT (OUTPATIENT)
Dept: PAIN MEDICINE | Facility: CLINIC | Age: 75
End: 2020-04-27
Payer: MEDICARE

## 2020-04-27 DIAGNOSIS — M51.36 DDD (DEGENERATIVE DISC DISEASE), LUMBAR: Primary | ICD-10-CM

## 2020-04-27 DIAGNOSIS — M47.896 OTHER SPONDYLOSIS, LUMBAR REGION: ICD-10-CM

## 2020-04-27 DIAGNOSIS — G89.29 CHRONIC BILATERAL LOW BACK PAIN WITHOUT SCIATICA: ICD-10-CM

## 2020-04-27 DIAGNOSIS — M25.551 RIGHT HIP PAIN: ICD-10-CM

## 2020-04-27 DIAGNOSIS — M54.50 CHRONIC BILATERAL LOW BACK PAIN WITHOUT SCIATICA: ICD-10-CM

## 2020-04-27 PROCEDURE — 99214 OFFICE O/P EST MOD 30 MIN: CPT | Mod: 95,,, | Performed by: PHYSICIAN ASSISTANT

## 2020-04-27 PROCEDURE — 99214 PR OFFICE/OUTPT VISIT, EST, LEVL IV, 30-39 MIN: ICD-10-PCS | Mod: 95,,, | Performed by: PHYSICIAN ASSISTANT

## 2020-04-27 RX ORDER — TRAMADOL HYDROCHLORIDE 50 MG/1
50 TABLET ORAL EVERY 8 HOURS PRN
Qty: 90 TABLET | Refills: 0 | Status: SHIPPED | OUTPATIENT
Start: 2020-04-28 | End: 2020-07-29 | Stop reason: SDUPTHER

## 2020-04-27 NOTE — PROGRESS NOTES
Referring Physician: No ref. provider found    PCP: Rambo Mayfield MD    CC: Lower back pain  Visit type: Virtual visit with synchronous audio and video  Total time spent with patient: 25 minutes  Each patient to whom he or she provides medical services by telemedicine is:  (1) informed of the relationship between the physician and patient and the respective role of any other health care provider with respect to management of the patient; and (2) notified that he or she may decline to receive medical services by telemedicine and may withdraw from such care at any time.  Patient is safe at home in [place]    Interval history:  Suki Macias is a 75 y.o. female who returns for f/u. She reports feeling a 'twinge' in her lower back that has increased in intensity recently. The pain is not radiating into her legs. Sometimes it radiates into the right hip. She denies LE weakness or bowel/bladder dysfucntion. She is still playing golf and fully participating in ADLs, but has noticed her low back pain slightly worsening. She is considering procedures to help. Has had L-ESIs in the past with Dr. Ellis. No records available. No recent imaging available. Pt states she may have records at home. Denies having recent lumbar imaging. Pain is 4/10.    Interval History:  Ms. Macias is a 75 y.o. female with chronic low back pain who returns to our clinic.  Lower back pain remains tolerable with her home exercises as well as some medication.  Pain is unchanged in quality or location. Tramadol 50 mg q 8 h and Celebrex provides moderate benefit. She reports waking up in pain. She is able to perform her ADLs and play golf. She is happy with her pain control and does not desire any interventions.  She has tried CBD but states it has not been helpful..  Pain today is rated 5/10.    Prior HPI:   She is a 71-year-old female referred to us for lower back pain.  Is been present for over 10 years.  She states having constant aching  pain in her lower back.  Pain radiates to her right hip at times.  Pain worsens with standing, walking and getting up.  She's had moderate benefit from physical therapy in the past.  She's also had lumbar BLAKE's in the past with moderate benefit.  Pain is currently tolerable with medications.  She takes tramadol 50 mg every 12 hours as needed.  She is able to perform her ADLs with the medication.  She also takes Celebrex with mild benefits.  Tramadol was provided by her PCP until recently.  She states her current PCP is unwilling to continue her tramadol even though he has prescribed to her for over 2 years.  She denies any weakness.  No bowel bladder changes.  She rates her pain 1/10 today.    ROS:  CONSTITUTIONAL: No fevers, chills, night sweats, wt. loss, appetite changes  SKIN: no rashes or itching  ENT: No headaches, head trauma, vision changes, or eye pain  LYMPH NODES: None noticed   CV: No chest pain, palpitations.   RESP: No shortness of breath, dyspnea on exertion, cough, wheezing, or hemoptysis  GI: No nausea, emesis, diarrhea, constipation, melena, hematochezia, pain.    : No dysuria, hematuria, urgency, or frequency   HEME: No easy bruising, bleeding problems  PSYCHIATRIC: No depression, anxiety, psychosis, hallucinations.  NEURO: No seizures, memory loss, dizziness or difficulty sleeping  MSK: + History of present illness      Past Medical History:   Diagnosis Date    Arthritis     Chronic diarrhea     Hypertension     Hypothyroid     Hypothyroidism, postablative     Squamous cell carcinoma 01/2018    SCCIS anterior scalp, imiquimod     Past Surgical History:   Procedure Laterality Date    BACK SURGERY  1973    BLADDER SUSPENSION  1987    BREAST BIOPSY      COLONOSCOPY  2011    procedure aborted. unable to complete    COLONOSCOPY N/A 5/21/2019    Procedure: sigmoid flex;  Surgeon: Ishaan Mayfield MD;  Location: [facility];  Service: Endoscopy;  Laterality: N/A;    HYSTERECTOMY  1985    bso     OOPHORECTOMY      SPINE SURGERY       Family History   Problem Relation Age of Onset    Colon cancer Mother     Thyroid disease Mother     Cancer Mother     Diabetes Mellitus Mother     Kidney disease Mother     Hypertension Mother     Heart disease Mother     Lung cancer Father     Rheum arthritis Father     Cancer Father     Breast cancer Sister     Hypertension Sister     Hyperlipidemia Sister     Lung cancer Brother     Heart failure Brother     Stroke Brother     Cancer Brother     COPD Brother     Heart disease Brother     Hyperlipidemia Brother     Breast cancer Sister     Osteoarthritis Sister     Hypertension Sister     Hyperlipidemia Sister     Rheum arthritis Daughter     Diabetes Mellitus Maternal Aunt     Rheum arthritis Paternal Aunt     Melanoma Neg Hx     Psoriasis Neg Hx     Lupus Neg Hx     Eczema Neg Hx     Inflammatory bowel disease Neg Hx     Chronic back pain Neg Hx     Asthma Neg Hx     Alcohol abuse Neg Hx      Social History     Socioeconomic History    Marital status:      Spouse name: Not on file    Number of children: Not on file    Years of education: Not on file    Highest education level: Not on file   Occupational History    Not on file   Social Needs    Financial resource strain: Not on file    Food insecurity:     Worry: Not on file     Inability: Not on file    Transportation needs:     Medical: Not on file     Non-medical: Not on file   Tobacco Use    Smoking status: Former Smoker     Last attempt to quit: 1970     Years since quittin.3    Smokeless tobacco: Former User   Substance and Sexual Activity    Alcohol use: Yes     Alcohol/week: 0.0 standard drinks     Frequency: Never     Comment: RARELY    Drug use: No    Sexual activity: Not Currently   Lifestyle    Physical activity:     Days per week: Not on file     Minutes per session: Not on file    Stress: Not on file   Relationships    Social connections:      Talks on phone: Not on file     Gets together: Not on file     Attends Caodaism service: Not on file     Active member of club or organization: Not on file     Attends meetings of clubs or organizations: Not on file     Relationship status: Not on file   Other Topics Concern    Are you pregnant or think you may be? Not Asked    Breast-feeding Not Asked   Social History Narrative    Not on file         Medications/Allergies: See med card    Physical exam:    GENERAL: A and O x3, the patient appears well groomed and is in no acute distress.  Skin: No rashes or obvious lesions  HEENT: normocephalic, atraumatic  LUNGS: non distended  ABDOMEN: soft, nontender   UPPER EXTREMITIES: normal range of motion  LOWER EXTREMITIES:  normal range of motion  LUMBAR SPINE  Lumbar spine: ROM is full with flexion extension and oblique extension with mild increased pain.      MENTAL STATUS: normal orientation, speech, language, and fund of knowledge for social situation.  Emotional state appropriate.    CRANIAL NERVES:  II:  PERRL bilaterally,   III,IV,VI: EOMI.    V:  Facial sensation equal bilaterally  VII:  Facial motor function normal.  VIII:  Hearing equal to finger rub bilaterally  IX/X: Gag normal, palate symmetric  XI:  Shoulder shrug equal, head turn equal  XII:  Tongue midline without fasciculations      MOTOR: Tone and bulk: normal bilateral upper and lower Strength: normal   Delt Bi Tri WE WF     R 5 5 5 5 5 5   L 5 5 5 5 5 5     IP ADD ABD Quad TA Gas HAM  R 5 5 5 5 5 5 5  L 5 5 5 5 5 5 5    SENSATION: Light touch and pinprick intact bilaterally  REFLEXES: normal, symmetric, nonbrisk.  Toes down, no clonus. No hoffmans.  GAIT: normal rise, base, steps, and arm swing.      Imaging:  None available    Assessment:  Ms. Macias is a 75 y.o. female with low back pain  1. DDD (degenerative disc disease), lumbar    2. Other spondylosis, lumbar region    3. Chronic bilateral low back pain without sciatica    4. Right hip  pain         Plan:  1. I have stressed the importance of physical activity and exercise to improve overall health  2.  Discussed MBB workup  In the future  3. Tramadol 50 mg q 8 h prn.   reviewed and consistent. No aberrant behavior. Previous UDS consistent  4.  Virtual visit 1 month  All medication management was performed by Dr. Kashif Starr

## 2020-04-29 NOTE — TELEPHONE ENCOUNTER
----- Message from Keshia Gonzalez sent at 4/29/2020  2:07 PM CDT -----  Contact: Patient  Type:  RX Refill Request    Who Called:  Patient  Refill or New Rx:  New RX  RX Name and Strength:  SYNTHROID 88 mcg tablet  How is the patient currently taking it? (ex. 1XDay):  Take 88 mcg by mouth before breakfast.  - Oral  Is this a 30 day or 90 day RX:  90 tablet  Preferred Pharmacy with phone number:    [facility]/pharmacy #[id number] - [place] - [address]  [address] [place] [address]  Phone: [phone] Fax: [phone]  Local or Mail Order:  local  Ordering Provider:  Historical Provider, MD  Best Call Back Number:  [phone]

## 2020-04-30 RX ORDER — LEVOTHYROXINE SODIUM 88 UG/1
88 TABLET ORAL
Qty: 90 TABLET | Refills: 3 | Status: SHIPPED | OUTPATIENT
Start: 2020-04-30 | End: 2020-05-12 | Stop reason: SDUPTHER

## 2020-05-05 ENCOUNTER — PATIENT MESSAGE (OUTPATIENT)
Dept: ADMINISTRATIVE | Facility: HOSPITAL | Age: 75
End: 2020-05-05

## 2020-05-13 RX ORDER — LEVOTHYROXINE SODIUM 88 UG/1
88 TABLET ORAL
Qty: 90 TABLET | Refills: 3 | Status: SHIPPED | OUTPATIENT
Start: 2020-05-13 | End: 2021-04-26

## 2020-05-20 ENCOUNTER — PATIENT MESSAGE (OUTPATIENT)
Dept: ADMINISTRATIVE | Facility: HOSPITAL | Age: 75
End: 2020-05-20

## 2020-06-08 ENCOUNTER — PATIENT OUTREACH (OUTPATIENT)
Dept: ADMINISTRATIVE | Facility: HOSPITAL | Age: 75
End: 2020-06-08

## 2020-06-22 ENCOUNTER — OFFICE VISIT (OUTPATIENT)
Dept: FAMILY MEDICINE | Facility: CLINIC | Age: 75
End: 2020-06-22
Payer: MEDICARE

## 2020-06-22 VITALS
BODY MASS INDEX: 20.08 KG/M2 | SYSTOLIC BLOOD PRESSURE: 122 MMHG | TEMPERATURE: 99 F | WEIGHT: 109.13 LBS | RESPIRATION RATE: 16 BRPM | OXYGEN SATURATION: 97 % | HEIGHT: 62 IN | DIASTOLIC BLOOD PRESSURE: 59 MMHG | HEART RATE: 97 BPM

## 2020-06-22 DIAGNOSIS — Z79.899 ENCOUNTER FOR LONG-TERM (CURRENT) USE OF MEDICATIONS: ICD-10-CM

## 2020-06-22 DIAGNOSIS — I10 ESSENTIAL HYPERTENSION: Primary | ICD-10-CM

## 2020-06-22 PROCEDURE — 99999 PR PBB SHADOW E&M-EST. PATIENT-LVL IV: CPT | Mod: PBBFAC,,, | Performed by: FAMILY MEDICINE

## 2020-06-22 PROCEDURE — 99213 PR OFFICE/OUTPT VISIT, EST, LEVL III, 20-29 MIN: ICD-10-PCS | Mod: S$PBB,,, | Performed by: FAMILY MEDICINE

## 2020-06-22 PROCEDURE — 99999 PR PBB SHADOW E&M-EST. PATIENT-LVL IV: ICD-10-PCS | Mod: PBBFAC,,, | Performed by: FAMILY MEDICINE

## 2020-06-22 PROCEDURE — 99213 OFFICE O/P EST LOW 20 MIN: CPT | Mod: S$PBB,,, | Performed by: FAMILY MEDICINE

## 2020-06-22 PROCEDURE — 99214 OFFICE O/P EST MOD 30 MIN: CPT | Mod: PBBFAC,PN | Performed by: FAMILY MEDICINE

## 2020-06-22 RX ORDER — AZELASTINE 1 MG/ML
1 SPRAY, METERED NASAL 2 TIMES DAILY PRN
Qty: 90 ML | Refills: 3 | Status: SHIPPED | OUTPATIENT
Start: 2020-06-22 | End: 2021-06-14

## 2020-06-22 NOTE — PROGRESS NOTES
"Subjective:       Patient ID: Suki Macias is a 75 y.o. female.    Chief Complaint: Follow-up (discuss premarin, tetanus vaccine completed w/Dr. Rodriguez)    In regards to the patient's hypertension, patient denies chest pain/sob, and reports compliance with medication regimen.    Review of Systems   Constitutional: Negative for activity change, appetite change, chills, fatigue and fever.   HENT: Negative for congestion, dental problem, facial swelling, nosebleeds, postnasal drip, sinus pain, sore throat, trouble swallowing and voice change.    Eyes: Negative for pain, discharge and visual disturbance.   Respiratory: Negative for apnea, cough, chest tightness and shortness of breath.    Cardiovascular: Negative for chest pain and palpitations.   Gastrointestinal: Negative for abdominal pain, blood in stool, constipation and nausea.   Endocrine: Negative for cold intolerance, polydipsia and polyuria.   Genitourinary: Negative for difficulty urinating, enuresis and flank pain.   Musculoskeletal: Negative for arthralgias and back pain.   Skin: Negative for color change.   Allergic/Immunologic: Negative for environmental allergies and immunocompromised state.   Neurological: Negative for dizziness and light-headedness.   Hematological: Negative for adenopathy.   Psychiatric/Behavioral: Negative for agitation, behavioral problems, decreased concentration and dysphoric mood. The patient is not nervous/anxious.    All other systems reviewed and are negative.        Reviewed family, medical, surgical, and social history.    Objective:      BP (!) 122/59 (BP Location: Left arm, Patient Position: Sitting, BP Method: Small (Manual))   Pulse 97   Temp 99.1 °F (37.3 °C) (Oral)   Resp 16   Ht 5' 2" (1.575 m)   Wt 49.5 kg (109 lb 1.6 oz)   SpO2 97%   BMI 19.95 kg/m²   Physical Exam  Vitals signs and nursing note reviewed.   Constitutional:       General: She is not in acute distress.     Appearance: She is " well-developed. She is not diaphoretic.   HENT:      Head: Normocephalic and atraumatic.      Nose: Nose normal.      Mouth/Throat:      Pharynx: No oropharyngeal exudate.   Eyes:      General: No scleral icterus.     Conjunctiva/sclera: Conjunctivae normal.      Pupils: Pupils are equal, round, and reactive to light.   Neck:      Musculoskeletal: Normal range of motion and neck supple.      Thyroid: No thyromegaly.   Cardiovascular:      Rate and Rhythm: Normal rate and regular rhythm.      Heart sounds: Normal heart sounds. No murmur. No friction rub. No gallop.    Pulmonary:      Effort: Pulmonary effort is normal. No respiratory distress.      Breath sounds: Normal breath sounds. No wheezing or rales.   Chest:      Chest wall: No tenderness.   Abdominal:      General: Bowel sounds are normal. There is no distension.      Palpations: Abdomen is soft.      Tenderness: There is no abdominal tenderness. There is no guarding.   Musculoskeletal: Normal range of motion.         General: No tenderness or deformity.   Lymphadenopathy:      Cervical: No cervical adenopathy.   Skin:     General: Skin is warm and dry.      Coloration: Skin is not pale.      Findings: No erythema or rash.   Neurological:      Mental Status: She is alert and oriented to person, place, and time.      Cranial Nerves: No cranial nerve deficit.      Sensory: No sensory deficit.      Motor: No abnormal muscle tone.      Deep Tendon Reflexes: Reflexes normal.   Psychiatric:         Behavior: Behavior normal.         Thought Content: Thought content normal.         Judgment: Judgment normal.         Assessment:       1. Essential hypertension    2. Encounter for long-term (current) use of medications        Plan:       Essential hypertension    Encounter for long-term (current) use of medications  -     azelastine (ASTELIN) 137 mcg (0.1 %) nasal spray; 1 spray (137 mcg total) by Nasal route 2 (two) times daily as needed for Rhinitis.  Dispense: 90  mL; Refill: 3            Risks, benefits, and side effects were discussed with the patient. All questions were answered to the fullest satisfaction of the patient, and pt verbalized understanding and agreement to treatment plan. Pt was to call with any new or worsening symptoms, or present to the ER.

## 2020-06-25 DIAGNOSIS — E78.2 HYPERCHOLESTEROLEMIA WITH HYPERTRIGLYCERIDEMIA: ICD-10-CM

## 2020-06-26 RX ORDER — FENOFIBRIC ACID 135 MG/1
CAPSULE, DELAYED RELEASE ORAL
Qty: 90 CAPSULE | Refills: 3 | Status: SHIPPED | OUTPATIENT
Start: 2020-06-26 | End: 2021-03-24

## 2020-07-08 ENCOUNTER — HOSPITAL ENCOUNTER (OUTPATIENT)
Dept: RADIOLOGY | Facility: HOSPITAL | Age: 75
Discharge: HOME | End: 2020-07-08
Attending: INTERNAL MEDICINE
Payer: MEDICARE

## 2020-07-08 DIAGNOSIS — E21.3 HYPERPARATHYROIDISM: ICD-10-CM

## 2020-07-08 PROCEDURE — 76536 US EXAM OF HEAD AND NECK: CPT | Mod: TC

## 2020-07-08 PROCEDURE — 76536 US EXAM OF HEAD AND NECK: CPT | Mod: 26,,, | Performed by: RADIOLOGY

## 2020-07-08 PROCEDURE — 76536 US SOFT TISSUE HEAD NECK THYROID: ICD-10-PCS | Mod: 26,,, | Performed by: RADIOLOGY

## 2020-07-13 ENCOUNTER — OFFICE VISIT (OUTPATIENT)
Dept: GASTROENTEROLOGY | Facility: CLINIC | Age: 75
End: 2020-07-13
Payer: MEDICARE

## 2020-07-13 VITALS
OXYGEN SATURATION: 96 % | BODY MASS INDEX: 19.51 KG/M2 | RESPIRATION RATE: 18 BRPM | WEIGHT: 106 LBS | DIASTOLIC BLOOD PRESSURE: 65 MMHG | HEART RATE: 78 BPM | TEMPERATURE: 98 F | SYSTOLIC BLOOD PRESSURE: 135 MMHG | HEIGHT: 62 IN

## 2020-07-13 DIAGNOSIS — R10.9 ABDOMINAL PAIN, UNSPECIFIED ABDOMINAL LOCATION: ICD-10-CM

## 2020-07-13 DIAGNOSIS — K57.30 DIVERTICULOSIS OF LARGE INTESTINE WITHOUT HEMORRHAGE: ICD-10-CM

## 2020-07-13 DIAGNOSIS — K21.9 GASTROESOPHAGEAL REFLUX DISEASE, ESOPHAGITIS PRESENCE NOT SPECIFIED: ICD-10-CM

## 2020-07-13 DIAGNOSIS — R19.7 DIARRHEA, UNSPECIFIED TYPE: Primary | ICD-10-CM

## 2020-07-13 DIAGNOSIS — K21.9 GASTROESOPHAGEAL REFLUX DISEASE WITHOUT ESOPHAGITIS: ICD-10-CM

## 2020-07-13 DIAGNOSIS — K44.9 HIATAL HERNIA: ICD-10-CM

## 2020-07-13 PROCEDURE — 99999 PR PBB SHADOW E&M-EST. PATIENT-LVL IV: ICD-10-PCS | Mod: PBBFAC,,, | Performed by: INTERNAL MEDICINE

## 2020-07-13 PROCEDURE — 99214 OFFICE O/P EST MOD 30 MIN: CPT | Mod: PBBFAC,PN | Performed by: INTERNAL MEDICINE

## 2020-07-13 PROCEDURE — 99213 OFFICE O/P EST LOW 20 MIN: CPT | Mod: S$PBB,,, | Performed by: INTERNAL MEDICINE

## 2020-07-13 PROCEDURE — 99999 PR PBB SHADOW E&M-EST. PATIENT-LVL IV: CPT | Mod: PBBFAC,,, | Performed by: INTERNAL MEDICINE

## 2020-07-13 PROCEDURE — 99213 PR OFFICE/OUTPT VISIT, EST, LEVL III, 20-29 MIN: ICD-10-PCS | Mod: S$PBB,,, | Performed by: INTERNAL MEDICINE

## 2020-07-13 RX ORDER — OMEPRAZOLE 20 MG/1
20 CAPSULE, DELAYED RELEASE ORAL DAILY
Qty: 90 CAPSULE | Refills: 3 | Status: SHIPPED | OUTPATIENT
Start: 2020-07-13 | End: 2020-12-10

## 2020-07-13 RX ORDER — DIPHENOXYLATE HYDROCHLORIDE AND ATROPINE SULFATE 2.5; .025 MG/1; MG/1
1 TABLET ORAL EVERY 6 HOURS PRN
Qty: 50 TABLET | Refills: 0 | Status: SHIPPED | OUTPATIENT
Start: 2020-07-13 | End: 2020-12-10 | Stop reason: ALTCHOICE

## 2020-07-13 NOTE — PROGRESS NOTES
Subjective:       Patient ID: Suki Macias is a 75 y.o. female.    Chief Complaint: Follow-up and Diarrhea    She states 3 weeks ago or diarrhea occurred.  She will awaken with diarrhea and have 1-2 bowel movements with urgency but without incontinence.  Then the evening will have a similar episode of diarrhea.  She denies fever chills hematemesis hematochezia jaundice or bleeding.  Several months ago she had diarrhea  and the evaluation essentially was negative except for diverticulosis.  The diarrhea resolved spontaneously.  She used and occasional Lomotil.  She denies a precipitating factor.  Approximately 6 weeks ago she went with her friends to [place] to play golf.  She ate in the restaurants.  She had a roommate who as an irritable bowel.  Apparently they had to have plumbing performed in their bathroom.  She states none of her friends have been ill.  She has developed severe indigestion heartburn is started back on the omeprazole.  She denies a precipitating factor except possibly spicy foods or tomato gravies.  Her weight remains stable.  She feels tired.      Allergies:  Review of patient's allergies indicates:   Allergen Reactions    Codeine Other (See Comments)     Hallucinations    Crestor [rosuvastatin]     Doxycycline Diarrhea    Lipitor [atorvastatin]        Medications:    Current Outpatient Medications:     aspirin (ECOTRIN) 81 MG EC tablet, Take 81 mg by mouth once daily., Disp: , Rfl:     azelastine (ASTELIN) 137 mcg (0.1 %) nasal spray, 1 spray (137 mcg total) by Nasal route 2 (two) times daily as needed for Rhinitis., Disp: 90 mL, Rfl: 3    celecoxib (CELEBREX) 200 MG capsule, Take 1 capsule (200 mg total) by mouth 2 (two) times daily as needed., Disp: 180 capsule, Rfl: 3    co-enzyme Q-10 30 mg capsule, Take 50 mg by mouth 2 (two) times daily. , Disp: , Rfl:     fenofibric acid (FIBRICOR) 135 mg CpDR, TAKE 1 CAPSULE ONCE DAILY WITH EVENING MEAL FOR CHOLESTEROL, Disp: 90  capsule, Rfl: 3    fish oil-omega-3 fatty acids 300-1,000 mg capsule, Take by mouth once daily., Disp: , Rfl:     lisinopriL (PRINIVIL,ZESTRIL) 2.5 MG tablet, TAKE 1 TABLET BY MOUTH ONCE DAILY, Disp: 90 tablet, Rfl: 3    PREMARIN 0.9 mg Tab, Take 1 tablet (0.9 mg total) by mouth once daily., Disp: 90 tablet, Rfl: 3    SYNTHROID 88 mcg tablet, Take 1 tablet (88 mcg total) by mouth before breakfast., Disp: 90 tablet, Rfl: 3    traMADoL (ULTRAM) 50 mg tablet, Take 1 tablet (50 mg total) by mouth every 8 (eight) hours as needed for Pain. Medically necessary for greater than 7 days for chronic pain, Disp: 90 tablet, Rfl: 0    diphenoxylate-atropine 2.5-0.025 mg (LOMOTIL) 2.5-0.025 mg per tablet, Take 1 tablet by mouth every 6 (six) hours as needed for Diarrhea., Disp: 50 tablet, Rfl: 0    omeprazole (PRILOSEC) 20 MG capsule, Take 1 capsule (20 mg total) by mouth once daily., Disp: 90 capsule, Rfl: 3    Past Medical History:   Diagnosis Date    Arthritis     Chronic diarrhea     Hypertension     Hypothyroid     Hypothyroidism, postablative     Squamous cell carcinoma 01/2018    SCCIS anterior scalp, imiquimod       Past Surgical History:   Procedure Laterality Date    BACK SURGERY  1973    BLADDER SUSPENSION  1987    BREAST BIOPSY      COLONOSCOPY  2011    procedure aborted. unable to complete    COLONOSCOPY N/A 5/21/2019    Procedure: sigmoid flex;  Surgeon: Ishaan Mayfield MD;  [facility];  Service: Endoscopy;  Laterality: N/A;    HYSTERECTOMY  1985    bso    OOPHORECTOMY      SPINE SURGERY           Review of Systems   Constitutional: Negative for appetite change, fever and unexpected weight change.   HENT: Negative for trouble swallowing.         No jaundice.   Respiratory: Negative for cough, shortness of breath and wheezing.         She denies dysphagia.  She denies hemoptysis.  She denies chronic cough chronic sputum production or dyspnea on exertion.  She is physically active and plays  golf.   Cardiovascular: Negative for chest pain.        She denies exertional chest pain or rhythm disturbance.   Gastrointestinal: Positive for diarrhea. Negative for abdominal distention, abdominal pain, anal bleeding, blood in stool, constipation, nausea, rectal pain and vomiting.   Endocrine:        She was told that she had a parathyroid adenoma is undergoing evaluation by her endocrinologist.  Additionally he regulates her Synthroid for her history of hypothyroidism.   Musculoskeletal: Positive for arthralgias and back pain. Negative for neck pain.   Skin: Negative for pallor and rash.   Neurological: Negative for dizziness, seizures, syncope, speech difficulty, weakness and numbness.   Hematological: Negative for adenopathy.   Psychiatric/Behavioral: Negative for confusion.       Objective:      Physical Exam  Vitals signs reviewed.   Constitutional:       Appearance: She is well-developed.      Comments: Well-nourished well-hydrated afebrile nonicteric thin white female.  She is oriented x3.  She is normocephalic.  She can relate her history and answer questions appropriately.  She is sitting comfortably in the chair.   HENT:      Head: Normocephalic.   Eyes:      Pupils: Pupils are equal, round, and reactive to light.   Neck:      Musculoskeletal: Normal range of motion and neck supple.      Thyroid: No thyromegaly.      Trachea: No tracheal deviation.   Cardiovascular:      Rate and Rhythm: Normal rate and regular rhythm.      Heart sounds: Normal heart sounds.   Pulmonary:      Effort: Pulmonary effort is normal.      Breath sounds: Normal breath sounds.   Abdominal:      General: There is no distension.      Palpations: There is no mass.      Tenderness: There is no abdominal tenderness. There is no guarding or rebound.      Hernia: No hernia is present.   Musculoskeletal: Normal range of motion.      Comments: From the sitting to the standing position without difficulty.  She can ambulate normally.    Lymphadenopathy:      Cervical: No cervical adenopathy.   Skin:     General: Skin is warm and dry.   Neurological:      Mental Status: She is alert and oriented to person, place, and time.      Cranial Nerves: No cranial nerve deficit.   Psychiatric:         Behavior: Behavior normal.           Plan:       Diarrhea, unspecified type  -     CT Abdomen With Without Contrast; Future; Expected date: 07/13/2020  -     Gastrointestinal Pathogens Panel, PCR; Future; Expected date: 07/13/2020  -     CBC auto differential; Future; Expected date: 07/13/2020  -     Comprehensive metabolic panel; Future; Expected date: 07/13/2020  -     C-reactive protein; Future; Expected date: 07/13/2020  -     Calprotectin; Future; Expected date: 07/13/2020  -     diphenoxylate-atropine 2.5-0.025 mg (LOMOTIL) 2.5-0.025 mg per tablet; Take 1 tablet by mouth every 6 (six) hours as needed for Diarrhea.  Dispense: 50 tablet; Refill: 0    Abdominal pain, unspecified abdominal location  -     CT Abdomen With Without Contrast; Future; Expected date: 07/13/2020  -     BUN; Future; Expected date: 07/13/2020  -     Creatinine, serum; Future; Expected date: 07/13/2020    Gastroesophageal reflux disease, esophagitis presence not specified  -     omeprazole (PRILOSEC) 20 MG capsule; Take 1 capsule (20 mg total) by mouth once daily.  Dispense: 90 capsule; Refill: 3     She will continue current medications.  She will follow-up with her endocrinologist for the hypercalcemia.  She will make a food diary and avoid the offending foods.  She continues her reflux regimen including the omeprazole.  Initially evaluation of her abdomen and labs will be obtained.

## 2020-07-13 NOTE — LETTER
July 15, 2020      Fiordaliza Martinez, LAUREN-C  [address]  [place]  [place] [address]           [facility]  [address]  [place] [place] [address]  Phone: [phone]  Fax: [phone]          Patient: Suki Macias   MR Number: [id number]   YOB: 1945   Date of Visit: 7/13/2020       Dear Fiordaliza Martinez:    Thank you for referring Suki Macias to me for evaluation. Attached you will find relevant portions of my assessment and plan of care.    If you have questions, please do not hesitate to call me. I look forward to following Suki Macias along with you.    Sincerely,    Ishaan Mayfield MD    Enclosure  CC:  No Recipients    If you would like to receive this communication electronically, please contact [email or web address] or [phone] to request more information on [employer] access.    For providers and/or their staff who would like to refer a patient to [facility], please contact us through our one-stop-shop provider referral line, [facility], at [phone].    If you feel you have received this communication in error or would no longer like to receive these types of communications, please e-mail [email or web address]

## 2020-07-13 NOTE — PATIENT INSTRUCTIONS
She has diarrhea 3 weeks duration.  She cannot pinpoint a precipitating factor but she will make a food diary and avoid the offending foods.  Because of her indigestion she will taker omeprazole 20 mg per day.  CT scan and lab studies will be obtained.  She will follow-up with her endocrinologist.

## 2020-07-14 ENCOUNTER — LAB VISIT (OUTPATIENT)
Dept: LAB | Facility: HOSPITAL | Age: 75
End: 2020-07-14
Attending: INTERNAL MEDICINE
Payer: MEDICARE

## 2020-07-14 DIAGNOSIS — R19.7 DIARRHEA, UNSPECIFIED TYPE: ICD-10-CM

## 2020-07-14 DIAGNOSIS — R10.9 ABDOMINAL PAIN, UNSPECIFIED ABDOMINAL LOCATION: ICD-10-CM

## 2020-07-14 DIAGNOSIS — E21.3 HYPERPARATHYROIDISM: ICD-10-CM

## 2020-07-14 LAB
ALBUMIN SERPL BCP-MCNC: 3.8 G/DL (ref 3.5–5.2)
ALBUMIN SERPL BCP-MCNC: 3.8 G/DL (ref 3.5–5.2)
ALP SERPL-CCNC: 46 U/L (ref 55–135)
ALP SERPL-CCNC: 46 U/L (ref 55–135)
ALT SERPL W/O P-5'-P-CCNC: 11 U/L (ref 10–44)
ALT SERPL W/O P-5'-P-CCNC: 11 U/L (ref 10–44)
ANION GAP SERPL CALC-SCNC: 11 MMOL/L (ref 8–16)
ANION GAP SERPL CALC-SCNC: 11 MMOL/L (ref 8–16)
AST SERPL-CCNC: 17 U/L (ref 10–40)
AST SERPL-CCNC: 17 U/L (ref 10–40)
BASOPHILS # BLD AUTO: 0.04 K/UL (ref 0–0.2)
BASOPHILS NFR BLD: 0.5 % (ref 0–1.9)
BILIRUB SERPL-MCNC: 0.6 MG/DL (ref 0.1–1)
BILIRUB SERPL-MCNC: 0.6 MG/DL (ref 0.1–1)
BUN SERPL-MCNC: 21 MG/DL (ref 8–23)
CALCIUM SERPL-MCNC: 11.2 MG/DL (ref 8.7–10.5)
CALCIUM SERPL-MCNC: 11.2 MG/DL (ref 8.7–10.5)
CHLORIDE SERPL-SCNC: 101 MMOL/L (ref 95–110)
CHLORIDE SERPL-SCNC: 101 MMOL/L (ref 95–110)
CO2 SERPL-SCNC: 24 MMOL/L (ref 23–29)
CO2 SERPL-SCNC: 24 MMOL/L (ref 23–29)
CREAT SERPL-MCNC: 1.1 MG/DL (ref 0.5–1.4)
CRP SERPL-MCNC: 4.32 MG/DL (ref 0–0.75)
DIFFERENTIAL METHOD: ABNORMAL
EOSINOPHIL # BLD AUTO: 0.1 K/UL (ref 0–0.5)
EOSINOPHIL NFR BLD: 1.6 % (ref 0–8)
ERYTHROCYTE [DISTWIDTH] IN BLOOD BY AUTOMATED COUNT: 13.6 % (ref 11.5–14.5)
EST. GFR  (AFRICAN AMERICAN): 56.4 ML/MIN/1.73 M^2
EST. GFR  (NON AFRICAN AMERICAN): 48.9 ML/MIN/1.73 M^2
GLUCOSE SERPL-MCNC: 91 MG/DL (ref 70–110)
GLUCOSE SERPL-MCNC: 91 MG/DL (ref 70–110)
HCT VFR BLD AUTO: 42.3 % (ref 37–48.5)
HGB BLD-MCNC: 13.5 G/DL (ref 12–16)
IMM GRANULOCYTES # BLD AUTO: 0.02 K/UL (ref 0–0.04)
IMM GRANULOCYTES NFR BLD AUTO: 0.2 % (ref 0–0.5)
LYMPHOCYTES # BLD AUTO: 2.1 K/UL (ref 1–4.8)
LYMPHOCYTES NFR BLD: 26.4 % (ref 18–48)
MCH RBC QN AUTO: 29.7 PG (ref 27–31)
MCHC RBC AUTO-ENTMCNC: 31.9 G/DL (ref 32–36)
MCV RBC AUTO: 93 FL (ref 82–98)
MONOCYTES # BLD AUTO: 1.1 K/UL (ref 0.3–1)
MONOCYTES NFR BLD: 14.1 % (ref 4–15)
NEUTROPHILS # BLD AUTO: 4.6 K/UL (ref 1.8–7.7)
NEUTROPHILS NFR BLD: 57.2 % (ref 38–73)
NRBC BLD-RTO: 0 /100 WBC
PLATELET # BLD AUTO: 361 K/UL (ref 150–350)
PMV BLD AUTO: 11.1 FL (ref 9.2–12.9)
POTASSIUM SERPL-SCNC: 4.5 MMOL/L (ref 3.5–5.1)
POTASSIUM SERPL-SCNC: 4.5 MMOL/L (ref 3.5–5.1)
PROT SERPL-MCNC: 7 G/DL (ref 6–8.4)
PROT SERPL-MCNC: 7 G/DL (ref 6–8.4)
PTH-INTACT SERPL-MCNC: 113 PG/ML (ref 9–77)
RBC # BLD AUTO: 4.54 M/UL (ref 4–5.4)
SODIUM SERPL-SCNC: 136 MMOL/L (ref 136–145)
SODIUM SERPL-SCNC: 136 MMOL/L (ref 136–145)
WBC # BLD AUTO: 8.11 K/UL (ref 3.9–12.7)

## 2020-07-14 PROCEDURE — 80053 COMPREHEN METABOLIC PANEL: CPT

## 2020-07-14 PROCEDURE — 83970 ASSAY OF PARATHORMONE: CPT

## 2020-07-14 PROCEDURE — 36415 COLL VENOUS BLD VENIPUNCTURE: CPT

## 2020-07-14 PROCEDURE — 85025 COMPLETE CBC W/AUTO DIFF WBC: CPT

## 2020-07-14 PROCEDURE — 86140 C-REACTIVE PROTEIN: CPT

## 2020-07-16 ENCOUNTER — HOSPITAL ENCOUNTER (OUTPATIENT)
Dept: RADIOLOGY | Facility: HOSPITAL | Age: 75
Discharge: HOME | End: 2020-07-16
Attending: INTERNAL MEDICINE
Payer: MEDICARE

## 2020-07-16 DIAGNOSIS — R19.7 DIARRHEA, UNSPECIFIED TYPE: Primary | ICD-10-CM

## 2020-07-16 DIAGNOSIS — K57.92 DIVERTICULITIS: Primary | ICD-10-CM

## 2020-07-16 DIAGNOSIS — R10.9 ABDOMINAL PAIN, UNSPECIFIED ABDOMINAL LOCATION: ICD-10-CM

## 2020-07-16 DIAGNOSIS — R19.7 DIARRHEA, UNSPECIFIED TYPE: ICD-10-CM

## 2020-07-16 DIAGNOSIS — K57.30 DIVERTICULOSIS OF LARGE INTESTINE WITHOUT HEMORRHAGE: ICD-10-CM

## 2020-07-16 PROCEDURE — 25500020 PHARM REV CODE 255: Performed by: INTERNAL MEDICINE

## 2020-07-16 PROCEDURE — 74177 CT ABD & PELVIS W/CONTRAST: CPT | Mod: 26,,, | Performed by: RADIOLOGY

## 2020-07-16 PROCEDURE — 74177 CT ABD & PELVIS W/CONTRAST: CPT | Mod: TC

## 2020-07-16 PROCEDURE — 74177 CT ABDOMEN PELVIS WITH CONTRAST: ICD-10-PCS | Mod: 26,,, | Performed by: RADIOLOGY

## 2020-07-16 RX ORDER — CIPROFLOXACIN 500 MG/1
500 TABLET ORAL 2 TIMES DAILY
Qty: 20 TABLET | Refills: 0 | Status: SHIPPED | OUTPATIENT
Start: 2020-07-16 | End: 2020-12-10 | Stop reason: ALTCHOICE

## 2020-07-16 RX ADMIN — IOHEXOL 75 ML: 350 INJECTION, SOLUTION INTRAVENOUS at 10:07

## 2020-07-16 NOTE — TELEPHONE ENCOUNTER
----- Message from Ishaan Mayfield MD sent at 7/16/2020 12:04 PM CDT -----  Tell her the CT scan shows possible diverticulitis.  Start Cipro 500 mg p.o. b.i.d. 20.  Additionally she has an right ovarian cyst and needs to see her gynecologist.

## 2020-07-19 ENCOUNTER — PATIENT OUTREACH (OUTPATIENT)
Dept: ADMINISTRATIVE | Facility: OTHER | Age: 75
End: 2020-07-19

## 2020-07-19 NOTE — PROGRESS NOTES
Chart was reviewed for overdue Proactive [facility] Encounters ([facility])  topics  Updates were requested from care everywhere  Health Maintenance has been updated

## 2020-07-23 ENCOUNTER — OFFICE VISIT (OUTPATIENT)
Dept: ENDOCRINOLOGY | Facility: CLINIC | Age: 75
End: 2020-07-23
Payer: MEDICARE

## 2020-07-23 VITALS
TEMPERATURE: 98 F | HEIGHT: 62 IN | HEART RATE: 75 BPM | SYSTOLIC BLOOD PRESSURE: 162 MMHG | WEIGHT: 109 LBS | BODY MASS INDEX: 20.06 KG/M2 | DIASTOLIC BLOOD PRESSURE: 70 MMHG

## 2020-07-23 DIAGNOSIS — E21.3 HYPERPARATHYROIDISM: Primary | ICD-10-CM

## 2020-07-23 DIAGNOSIS — E89.0 POSTABLATIVE HYPOTHYROIDISM: ICD-10-CM

## 2020-07-23 DIAGNOSIS — M85.852 OSTEOPENIA OF NECK OF LEFT FEMUR: ICD-10-CM

## 2020-07-23 PROCEDURE — 99214 OFFICE O/P EST MOD 30 MIN: CPT | Mod: PBBFAC,PO | Performed by: INTERNAL MEDICINE

## 2020-07-23 PROCEDURE — 99999 PR PBB SHADOW E&M-EST. PATIENT-LVL IV: ICD-10-PCS | Mod: PBBFAC,,, | Performed by: INTERNAL MEDICINE

## 2020-07-23 PROCEDURE — 99214 OFFICE O/P EST MOD 30 MIN: CPT | Mod: S$PBB,,, | Performed by: INTERNAL MEDICINE

## 2020-07-23 PROCEDURE — 99214 PR OFFICE/OUTPT VISIT, EST, LEVL IV, 30-39 MIN: ICD-10-PCS | Mod: S$PBB,,, | Performed by: INTERNAL MEDICINE

## 2020-07-23 PROCEDURE — 99999 PR PBB SHADOW E&M-EST. PATIENT-LVL IV: CPT | Mod: PBBFAC,,, | Performed by: INTERNAL MEDICINE

## 2020-07-23 NOTE — PATIENT INSTRUCTIONS
For the parathyroid:   You could think about surgery, for the kidney function. But otherwise, you don't have osteoporosis and your calcium is not over 11.5 yet, you don't have kidney stones, so okay to monitor for now.    Recheck labs in Jan 2021.      Understanding Primary Hyperparathyroidism    The parathyroid glands are 4 tiny glands in the neck. They make parathyroid hormone (PTH). PTH controls the amount of calcium and phosphorus in your blood. Primary hyperparathyroidism is when there is too much PTH in your blood. It occurs when one or more of the glands are too active.  The job of PTH is to tell the body how to control calcium. Too much PTH means the body increases the amount of calcium in the blood. This leads to a problem called hypercalcemia. This is when the amount of calcium in the blood is too high. Hypercalcemia can cause serious health problems.  Causes  Hyperparathyroidism can occur when a parathyroid gland becomes enlarged. It can also occur as a complication of another health conditions, such as kidney failure or rickets. In these conditions, calcium is usually not high. This is called secondary hyperparathyroidism.  Whos at risk  The risk factors for this condition include:  · Being a woman (its less common in men)  · Being older (its more likely to occur with age)  · Having parents or siblings with the condition or other endocrine tumors  · Having certain kidney problems  · Taking certain medicines  · Having had radiation treatment in the head or neck  Symptoms  Symptoms of the condition can include:  · Muscle weakness  · Depression  · Tiredness  · Confusion and memory loss  · Poor memory  · Nausea and vomiting  · Pain in the stomach area (abdomen)  · Hard stools (constipation)  · Stomach ulcers  · Need to urinate often  · Kidney stones  · Joint or bone pain  · Bone disease (osteopenia or osteoporosis), an increase in bone fractures  · High blood pressure  · Increased thirst  Treatment  If  primary hyperparathyroidism is not treated, it can get worse over time. Treatments include:  · Surgery. This may be done to remove any enlarged parathyroid glands. This lets the amount of calcium in the blood go back to normal. You may need to take vitamin D and calcium supplements before the surgery. This will reduce the risk of low calcium after the surgery.   · Medicine. This lowers the amount of parathyroid hormone made by the overactive glands.   You and your healthcare provider can discuss your treatment options. Make sure to ask any questions you have.  Date Last Reviewed: 11/1/2016  © 2343-1332 [facility]. [address] [place] [address]. All rights reserved. This information is not intended as a substitute for professional medical care. Always follow your healthcare professional's instructions.

## 2020-07-23 NOTE — PROGRESS NOTES
Subjective:      Chief Complaint: Hyperparathyroidism    HPI: Suki Macias is a 75 y.o. female who is here for a follow-up evaluation for hyperparathyroidism. Last seen 3/19/2020.    Reports GI issues about 1.5 years ago. On steroids, didn't get better. Saw somebody else who found high calcium, prompting additional workup. Saw endocrine (Dr. Schmitz) to eval calcium, parathyroid.     Had elevated PTH for a while. Since at least 2018.   Calcium mildly elevated off and on, up to 11.6 at the highest, though last Ca normal.     Last labs:  Lab Results   Component Value Date    CALCIUM 11.2 (H) 07/14/2020    CALCIUM 11.2 (H) 07/14/2020    ALBUMIN 3.8 07/14/2020    ALBUMIN 3.8 07/14/2020    CREATININE 1.1 07/14/2020    CREATININE 1.1 07/14/2020    CREATININE 1.1 07/14/2020    [id number] 31 03/13/2020    .0 (H) 07/14/2020    GFR 49.    Prior labs:        Lab Results   Component Value Date     CALCIUM 10.0 03/13/2020     ALBUMIN 4.0 04/22/2019     CREATININE 1.0 03/13/2020     [id number] 31 03/13/2020     .0 (H) 03/13/2020   Had 24 hr urine Ca of 140 (volume only 600, no creatinine at that time).     Repeat 24 hr urine 7/2020: Volume 500. Creat 800   calcium 192. Low, but with low volume need to compare with creatinine.  Serum Ca 11.2, alb 3.8, Cr 1.1   Ratio: 0.023   - Not suggestive of FHH    Neck US 7/8/2020:   Potential parathyroid adenoma left upper pole. 1.2x0.8x0.6 cm in size, hypoechoic.    DXA 11/2019: normal   Forearm tscore 0.8  DXA 10/2019: osteopenia. FRAX 11% major, 2.7% hip.   spine tscore +2.0   Left hip -1.8     Not smoking.  No FH hip fractures. +FH osteoporosis (patient's mother)    Was on vit D in the past, then told to stop, now back on vitamin D.    OTC, maybe 1,000 units/day but not sure    Pt also has hx hyperthyroidism s/p radiation about 35+years ago, then with post-ablative hypothyroidism. She has had some issues with getting dose regulated.    Lab Results   Component  Value Date    TSH 2.130 03/13/2020    [id number] 16.3 (H) 06/19/2017    FREET4 1.38 03/13/2020     On 88 mcg/day Synthroid (brand medication).     Today, pt reports feeling okay overall. Fatigue. Recovering from diarrhea illness.  No falls, no fractures.  No kidney stones.  Denies polyuria, polydipsia. Maybe 1x nocturia.  Not on thiazides.  Not taking biotin.  Doesn't get a lot of calcium in the diet.    Reviewed past medical, family, social history and updated as appropriate.    Review of Systems   Constitutional: Positive for fatigue. Negative for unexpected weight change.   HENT: Negative for trouble swallowing.    Eyes: Negative for visual disturbance.   Respiratory: Negative for shortness of breath.    Cardiovascular: Negative for palpitations.   Gastrointestinal: Positive for diarrhea. Negative for constipation.   Endocrine: Negative for cold intolerance, heat intolerance, polydipsia and polyuria.        Sometimes hot, sometimes cold different times of the day   Genitourinary: Negative for frequency.   Musculoskeletal: Negative for back pain.   Neurological: Positive for dizziness (if getting up too quickly). Negative for weakness and light-headedness.   Psychiatric/Behavioral: Positive for sleep disturbance (sleeps a lot).     Objective:     Vitals:    07/23/20 1129   BP: (!) 162/70   Pulse: 75   Temp: 97.9 °F (36.6 °C)     BP Readings from Last 5 Encounters:   07/23/20 (!) 162/70   07/13/20 135/65   06/22/20 (!) 122/59   03/19/20 138/82   03/18/20 (!) 163/86     Physical Exam  Vitals signs reviewed.   Constitutional:       General: She is not in acute distress.  Neck:      Thyroid: No thyromegaly.   Cardiovascular:      Heart sounds: Normal heart sounds.   Pulmonary:      Effort: Pulmonary effort is normal.         Wt Readings from Last 10 Encounters:   07/23/20 1129 49.5 kg (109 lb 0.3 oz)   07/13/20 1357 48.1 kg (106 lb)   06/22/20 1122 49.5 kg (109 lb 1.6 oz)   03/19/20 1311 51 kg (112 lb 7 oz)   03/18/20  1022 50.8 kg (112 lb)   01/27/20 1514 51.7 kg (114 lb)   12/23/19 1040 51.8 kg (114 lb 3.2 oz)   11/06/19 0902 48.1 kg (106 lb)   09/16/19 1100 48.1 kg (106 lb)   09/09/19 1308 47.2 kg (104 lb)   09/09/19 1307 47.2 kg (104 lb)       Lab Results   Component Value Date    HGBA1C 5.7 (H) 04/22/2019     Lab Results   Component Value Date    CHOL 275 (H) 04/22/2019    HDL 74 04/22/2019    LDLCALC 157 (H) 04/22/2019    TRIG 271 (H) 04/22/2019    CHOLHDL 3.7 04/22/2019     Lab Results   Component Value Date     07/14/2020     07/14/2020    K 4.5 07/14/2020    K 4.5 07/14/2020     07/14/2020     07/14/2020    CO2 24 07/14/2020    CO2 24 07/14/2020    GLU 91 07/14/2020    GLU 91 07/14/2020    BUN 21 07/14/2020    BUN 21 07/14/2020    BUN 21 07/14/2020    CREATININE 1.1 07/14/2020    CREATININE 1.1 07/14/2020    CREATININE 1.1 07/14/2020    CALCIUM 11.2 (H) 07/14/2020    CALCIUM 11.2 (H) 07/14/2020    PROT 7.0 07/14/2020    PROT 7.0 07/14/2020    ALBUMIN 3.8 07/14/2020    ALBUMIN 3.8 07/14/2020    BILITOT 0.6 07/14/2020    BILITOT 0.6 07/14/2020    ALKPHOS 46 (L) 07/14/2020    ALKPHOS 46 (L) 07/14/2020    AST 17 07/14/2020    AST 17 07/14/2020    ALT 11 07/14/2020    ALT 11 07/14/2020    ANIONGAP 11 07/14/2020    ANIONGAP 11 07/14/2020    ESTGFRAFRICA 56.4 (A) 07/14/2020    ESTGFRAFRICA 56.4 (A) 07/14/2020    ESTGFRAFRICA 56.4 (A) 07/14/2020    EGFRNONAA 48.9 (A) 07/14/2020    EGFRNONAA 48.9 (A) 07/14/2020    EGFRNONAA 48.9 (A) 07/14/2020    TSH 2.130 03/13/2020      Assessment/Plan:     Hyperparathyroidism  primary hyperparathyroidism   - last vit D 31, encourage pt to take OTC vit D daily   - 24 hour urine still with low volume, but calcium to creatinine clearance ratio not suggestive of FHH   - had DXA with osteopenia. Normal forearm. FRAX not indicating treatment needed at this time  Discussed indications for surgery for primary hyperparathyroidism:   - has slightly low GFR which could qualify her.  Otherwise no stones, calcium not too high, no fractures/osteoporosis so the GFR is her main potential indication.   - obtained US with likely parathyroid adenoma   - Discussed potential to go for surgery. Pt not interested at this time. Will recheck in about 6 months or so. Reconsider sooner if symptoms develop.  Avoid dehydration, excessive calcium supplementation and meds (HCTZ) that can worsen hypercalcemia.      Osteopenia of neck of left femur  Seen on last DXA   - no falls/fractures.   - FRAX not high enough to recommend treatment at this time   - encourage pt to take vit D   - continue to monitor      Hypothyroid  Hx Graves disease, s/p RAIA decades ago, now hypothyroid   - on synthroid 88, clinically euthyroid   - last labs normal   - continue same dose        Follow up in about 6 months (around 1/23/2021) for lab review, further monitoring.      Aman Sood MD  Endocrinology

## 2020-07-24 NOTE — ASSESSMENT & PLAN NOTE
Hx Graves disease, s/p RAIA decades ago, now hypothyroid   - on synthroid 88, clinically euthyroid   - last labs normal   - continue same dose

## 2020-07-24 NOTE — ASSESSMENT & PLAN NOTE
primary hyperparathyroidism   - last vit D 31, encourage pt to take OTC vit D daily   - 24 hour urine still with low volume, but calcium to creatinine clearance ratio not suggestive of FHH   - had DXA with osteopenia. Normal forearm. FRAX not indicating treatment needed at this time  Discussed indications for surgery for primary hyperparathyroidism:   - has slightly low GFR which could qualify her. Otherwise no stones, calcium not too high, no fractures/osteoporosis so the GFR is her main potential indication.   - obtained US with likely parathyroid adenoma   - Discussed potential to go for surgery. Pt not interested at this time. Will recheck in about 6 months or so. Reconsider sooner if symptoms develop.  Avoid dehydration, excessive calcium supplementation and meds (HCTZ) that can worsen hypercalcemia.

## 2020-07-24 NOTE — ASSESSMENT & PLAN NOTE
Seen on last DXA   - no falls/fractures.   - FRAX not high enough to recommend treatment at this time   - encourage pt to take vit D   - continue to monitor

## 2020-07-29 RX ORDER — TRAMADOL HYDROCHLORIDE 50 MG/1
50 TABLET ORAL EVERY 8 HOURS PRN
Qty: 90 TABLET | Refills: 0 | Status: SHIPPED | OUTPATIENT
Start: 2020-07-29 | End: 2020-08-26

## 2020-07-29 NOTE — TELEPHONE ENCOUNTER
----- Message from Johan Murguia sent at 7/29/2020  2:12 PM CDT -----  Type:  RX Refill Request    Who Called:  Patient  Refill or New Rx:  New  RX Name and Strength: traMADoL (ULTRAM) 50 mg tablet      How is the patient currently taking it? (ex. 1XDay):   Take 1 tablet (50 mg total) by mouth every 8 (eight) hours as needed for Pain. Medically necessary for greater than 7 days for chronic pain - Oral    Is this a 30 day or 90 day RX: 90 Tablets    Preferred Pharmacy with phone number:    [place] [facility] [place] - [address]  [place] [address]  Phone: [phone] Fax: [phone]    Local or Mail Order:  Local  Ordering Provider:  Chaparro Priest Call Back Number [phone]  Additional Information:

## 2020-08-03 ENCOUNTER — PATIENT OUTREACH (OUTPATIENT)
Dept: ADMINISTRATIVE | Facility: OTHER | Age: 75
End: 2020-08-03

## 2020-08-03 ENCOUNTER — OFFICE VISIT (OUTPATIENT)
Dept: GASTROENTEROLOGY | Facility: CLINIC | Age: 75
End: 2020-08-03
Payer: MEDICARE

## 2020-08-03 VITALS
DIASTOLIC BLOOD PRESSURE: 81 MMHG | WEIGHT: 108 LBS | TEMPERATURE: 98 F | OXYGEN SATURATION: 97 % | BODY MASS INDEX: 19.88 KG/M2 | SYSTOLIC BLOOD PRESSURE: 171 MMHG | HEIGHT: 62 IN | HEART RATE: 66 BPM | RESPIRATION RATE: 18 BRPM

## 2020-08-03 DIAGNOSIS — K64.0 GRADE I HEMORRHOIDS: ICD-10-CM

## 2020-08-03 DIAGNOSIS — K64.9 HEMORRHOIDS, UNSPECIFIED HEMORRHOID TYPE: Primary | ICD-10-CM

## 2020-08-03 DIAGNOSIS — K57.30 DIVERTICULOSIS OF LARGE INTESTINE WITHOUT HEMORRHAGE: ICD-10-CM

## 2020-08-03 DIAGNOSIS — K21.9 GASTROESOPHAGEAL REFLUX DISEASE WITHOUT ESOPHAGITIS: ICD-10-CM

## 2020-08-03 PROCEDURE — 99999 PR PBB SHADOW E&M-EST. PATIENT-LVL IV: ICD-10-PCS | Mod: PBBFAC,,, | Performed by: INTERNAL MEDICINE

## 2020-08-03 PROCEDURE — 99213 OFFICE O/P EST LOW 20 MIN: CPT | Mod: S$PBB,,, | Performed by: INTERNAL MEDICINE

## 2020-08-03 PROCEDURE — 99213 PR OFFICE/OUTPT VISIT, EST, LEVL III, 20-29 MIN: ICD-10-PCS | Mod: S$PBB,,, | Performed by: INTERNAL MEDICINE

## 2020-08-03 PROCEDURE — 99214 OFFICE O/P EST MOD 30 MIN: CPT | Mod: PBBFAC,PN | Performed by: INTERNAL MEDICINE

## 2020-08-03 PROCEDURE — 99999 PR PBB SHADOW E&M-EST. PATIENT-LVL IV: CPT | Mod: PBBFAC,,, | Performed by: INTERNAL MEDICINE

## 2020-08-03 RX ORDER — HYDROCORTISONE 10 MG/G
1 CREAM TOPICAL 2 TIMES DAILY PRN
Qty: 1 TUBE | Refills: 2 | Status: SHIPPED | OUTPATIENT
Start: 2020-08-03 | End: 2020-12-10 | Stop reason: ALTCHOICE

## 2020-08-03 NOTE — LETTER
August 3, 2020      Fiordaliza Martinez, LAUREN-C  [address]  [place]  [place] [address]           [facility]  [address]  [place] [place] [facility] [place] [address]  Phone: [phone]  Fax: [phone]          Patient: Suki Macias   MR Number: [id number]   YOB: 1945   Date of Visit: 8/3/2020       Dear Fiordaliza Martinez:    Thank you for referring Suki Macias to me for evaluation. Attached you will find relevant portions of my assessment and plan of care.    If you have questions, please do not hesitate to call me. I look forward to following Suki Macias along with you.    Sincerely,    Ishaan Mayfield MD    Enclosure  CC:  No Recipients    If you would like to receive this communication electronically, please contact [email or web address] or [phone] to request more information on [employer] Link access.    For providers and/or their staff who would like to refer a patient to [facility], please contact us through our one-stop-shop provider referral line, [facility], at [phone].    If you feel you have received this communication in error or would no longer like to receive these types of communications, please e-mail [email or web address]

## 2020-08-03 NOTE — PROGRESS NOTES
Subjective:       Patient ID: Suki Macias is a 75 y.o. female.    Chief Complaint: Follow-up    She was evaluated for diarrhea.  She was found to have in diverticulitis.  She completed her Cipro.  Her bowel function is now normal.  In fact she had an episode of constipation.  She has a long history of hemorrhoids since her pregnancies.  She denies bleeding or jaundice.  With the firm bowel movement she has had rectal irritation.  CT scan showed probable diverticulitis.  Recent colonoscopy revealed diverticulosis and an area of hyperemia in the sigmoid.  Labs reveal the calprotectin was elevated as was the sed rate but the other labs were normal except for calcium of 11.2.  She has been evaluated by her endocrinologist and has been encouraged to have surgery.  The omeprazole has resolved the pyrosis and dyspepsia.  She denies nausea vomiting hematemesis hematochezia jaundice or bleeding.  She is physically active.  She tries to eat apples daily to provide adequate fiber.  She denies fever chills.      Allergies:  Review of patient's allergies indicates:   Allergen Reactions    Codeine Other (See Comments)     Hallucinations    Crestor [rosuvastatin]     Doxycycline Diarrhea    Lipitor [atorvastatin]        Medications:    Current Outpatient Medications:     aspirin (ECOTRIN) 81 MG EC tablet, Take 81 mg by mouth once daily., Disp: , Rfl:     azelastine (ASTELIN) 137 mcg (0.1 %) nasal spray, 1 spray (137 mcg total) by Nasal route 2 (two) times daily as needed for Rhinitis., Disp: 90 mL, Rfl: 3    celecoxib (CELEBREX) 200 MG capsule, Take 1 capsule (200 mg total) by mouth 2 (two) times daily as needed., Disp: 180 capsule, Rfl: 3    ciprofloxacin HCl (CIPRO) 500 MG tablet, Take 1 tablet (500 mg total) by mouth 2 (two) times daily., Disp: 20 tablet, Rfl: 0    co-enzyme Q-10 30 mg capsule, Take 50 mg by mouth 2 (two) times daily. , Disp: , Rfl:     diphenoxylate-atropine 2.5-0.025 mg (LOMOTIL) 2.5-0.025 mg  per tablet, Take 1 tablet by mouth every 6 (six) hours as needed for Diarrhea., Disp: 50 tablet, Rfl: 0    fenofibric acid (FIBRICOR) 135 mg CpDR, TAKE 1 CAPSULE ONCE DAILY WITH EVENING MEAL FOR CHOLESTEROL, Disp: 90 capsule, Rfl: 3    fish oil-omega-3 fatty acids 300-1,000 mg capsule, Take by mouth once daily., Disp: , Rfl:     lisinopriL (PRINIVIL,ZESTRIL) 2.5 MG tablet, TAKE 1 TABLET BY MOUTH ONCE DAILY, Disp: 90 tablet, Rfl: 3    omeprazole (PRILOSEC) 20 MG capsule, Take 1 capsule (20 mg total) by mouth once daily., Disp: 90 capsule, Rfl: 3    PREMARIN 0.9 mg Tab, Take 1 tablet (0.9 mg total) by mouth once daily., Disp: 90 tablet, Rfl: 3    SYNTHROID 88 mcg tablet, Take 1 tablet (88 mcg total) by mouth before breakfast., Disp: 90 tablet, Rfl: 3    traMADoL (ULTRAM) 50 mg tablet, Take 1 tablet (50 mg total) by mouth every 8 (eight) hours as needed for Pain. Medically necessary for greater than 7 days for chronic pain, Disp: 90 tablet, Rfl: 0    hydrocortisone (PROCTOCORT) 1 % crpe, Place 1 applicator rectally 2 (two) times daily as needed., Disp: 1 Tube, Rfl: 2    Past Medical History:   Diagnosis Date    Arthritis     Chronic diarrhea     Hypertension     Hypothyroid     Hypothyroidism, postablative     Squamous cell carcinoma 01/2018    SCCIS anterior scalp, imiquimod       Past Surgical History:   Procedure Laterality Date    BACK SURGERY  1973    BLADDER SUSPENSION  1987    BREAST BIOPSY      COLONOSCOPY  2011    procedure aborted. unable to complete    COLONOSCOPY N/A 5/21/2019    Procedure: sigmoid flex;  Surgeon: Ishaan Mayfield MD;  [facility];  Service: Endoscopy;  Laterality: N/A;    HYSTERECTOMY  1985    bso    OOPHORECTOMY      SPINE SURGERY           Review of Systems   Constitutional: Negative for appetite change, fever and unexpected weight change.   HENT: Negative for trouble swallowing.         No jaundice.   Respiratory: Negative for cough, shortness of breath and  wheezing.         She is a former cigarette smoker.  She denies tobacco alcohol usage.  She denies dysphagia aspiration hemoptysis chronic cough chronic sputum production or dyspnea on exertion.   Cardiovascular: Negative for chest pain.        She denies exertional chest pain or rhythm disturbance.   Gastrointestinal: Negative for abdominal distention, abdominal pain, anal bleeding, blood in stool, constipation, diarrhea, nausea and rectal pain.        The omeprazole has resolved the pyrosis and dyspepsia.  She denies dysphagia aspiration.  She tries to eat a nutritious diet in she avoids the fatty or greasy foods.  She denies abdominal pain.   Endocrine:        She states the endocrinologist performed an ultrasound and told her that she has hyper parathyroidism.  She states that she is deciding whether to have surgery.   Genitourinary:        She has had bladder surgery.  She denies incontinence hematuria or frequent infections.   Musculoskeletal: Positive for arthralgias. Negative for neck pain.        She has a history of back surgery.   Skin: Negative for pallor and rash.   Neurological: Negative for dizziness, seizures, syncope, speech difficulty, weakness and numbness.   Hematological: Negative for adenopathy.   Psychiatric/Behavioral: Negative for confusion.       Objective:      Physical Exam  Vitals signs reviewed.   Constitutional:       Appearance: She is well-developed.      Comments: Well-nourished well-hydrated thin afebrile nonicteric white female.  She is oriented x3.  She can relate her history and answer questions appropriately.  She is sitting comfortably in the chair.  She is normocephalic.  Pupils are normal.   HENT:      Head: Normocephalic.   Eyes:      Pupils: Pupils are equal, round, and reactive to light.   Neck:      Musculoskeletal: Normal range of motion and neck supple.      Thyroid: No thyromegaly.      Trachea: No tracheal deviation.   Cardiovascular:      Rate and Rhythm: Normal  rate and regular rhythm.      Heart sounds: Normal heart sounds.   Pulmonary:      Effort: Pulmonary effort is normal.      Breath sounds: Normal breath sounds.   Abdominal:      General: There is no distension.      Palpations: There is no mass.      Tenderness: There is no abdominal tenderness. There is no right CVA tenderness, left CVA tenderness, guarding or rebound.      Hernia: No hernia is present.   Musculoskeletal: Normal range of motion.      Comments: She can ambulate normally.  She can go from the sitting the standing position without difficulty.   Lymphadenopathy:      Cervical: No cervical adenopathy.   Skin:     General: Skin is warm and dry.   Neurological:      Mental Status: She is alert and oriented to person, place, and time.      Cranial Nerves: No cranial nerve deficit.   Psychiatric:         Behavior: Behavior normal.           Plan:       Hemorrhoids, unspecified hemorrhoid type  -     hydrocortisone (PROCTOCORT) 1 % crpe; Place 1 applicator rectally 2 (two) times daily as needed.  Dispense: 1 Tube; Refill: 2    Grade I hemorrhoids    Gastroesophageal reflux disease without esophagitis    Diverticulosis of large intestine without hemorrhage     She will continue her reflux regimen and the omeprazole.  She continues her nutritious diet with additional fiber.  She is given a booklet on diverticulosis.  She follows up with her endocrinologist.  She continues her current medications vitamins and minerals.  At this point she does not want further GI evaluation.  She started on the Mckeon cord cream for the hemorrhoids.  She was offered referral to the rectal surgeon.

## 2020-08-03 NOTE — PATIENT INSTRUCTIONS
She will continue her reflux regimen and omeprazole.  She continues her bowel program with additional fiber including the ingestion of apples to avoid constipation.  She was start on the product cord cream for the hemorrhoids.  She was offered referral to the rectal surgeon.  She will continue the follow-up with the endocrinologist.  She continues her vitamins minerals and current medications.

## 2020-08-03 NOTE — PROGRESS NOTES
Requested updates within Care Everywhere.  Patient's chart was reviewed for overdue WOLFGANG topics.  Immunizations reconciled.

## 2020-08-27 ENCOUNTER — PATIENT MESSAGE (OUTPATIENT)
Dept: ENDOCRINOLOGY | Facility: CLINIC | Age: 75
End: 2020-08-27

## 2020-09-29 ENCOUNTER — PATIENT MESSAGE (OUTPATIENT)
Dept: OTHER | Facility: OTHER | Age: 75
End: 2020-09-29

## 2020-09-29 RX ORDER — TRAMADOL HYDROCHLORIDE 50 MG/1
TABLET ORAL
Qty: 90 EACH | Refills: 0 | Status: SHIPPED | OUTPATIENT
Start: 2020-09-29 | End: 2020-11-24

## 2020-10-05 ENCOUNTER — PATIENT MESSAGE (OUTPATIENT)
Dept: RESEARCH | Facility: OTHER | Age: 75
End: 2020-10-05

## 2020-10-30 ENCOUNTER — TELEPHONE (OUTPATIENT)
Dept: FAMILY MEDICINE | Facility: CLINIC | Age: 75
End: 2020-10-30

## 2020-10-30 NOTE — LETTER
"November 5, 2020    Suki Macias  [address] [place] [address]             [facility]  [address] [place] [address]  Phone: [phone]  Fax: [phone] Dear Suki Macias,       Rambo Mayfield MD and his team were reviewing your last few blood pressure values in your chart  from your clinic visits and noticed that they were trending upward. Dr. Rambo Mayfield MD would like for you to come in to have your blood pressure rechecked. This visit would be no charge to you. Please call us at [phone] and ask to schedule a "Nurse Visit". If you are unable to come in and if you are able to read your own blood pressure, we can take your reading over the phone or you can send it through your MY CHART message.Thank You for choosing [facility] for your healthcare needs.       Your [facility]   Sharon Mota L.P.N. Clinical Care Coordinator   [address], [place] [id number] [phone]        "

## 2020-11-05 NOTE — TELEPHONE ENCOUNTER
"Attempted to outreach patient for pre-visit via "[employer]", no answer after a week. Sending outreach via Mail Out Letter now.    "

## 2020-11-18 ENCOUNTER — PATIENT MESSAGE (OUTPATIENT)
Dept: ENDOCRINOLOGY | Facility: CLINIC | Age: 75
End: 2020-11-18

## 2020-11-24 DIAGNOSIS — E21.3 HYPERPARATHYROIDISM: Primary | ICD-10-CM

## 2020-12-01 DIAGNOSIS — Z79.899 ENCOUNTER FOR LONG-TERM (CURRENT) USE OF MEDICATIONS: ICD-10-CM

## 2020-12-01 RX ORDER — ESTROGENS, CONJUGATED 0.9 MG/1
0.9 TABLET, FILM COATED ORAL DAILY
Qty: 90 TABLET | Refills: 3 | Status: SHIPPED | OUTPATIENT
Start: 2020-12-01 | End: 2021-12-30

## 2020-12-04 ENCOUNTER — LAB VISIT (OUTPATIENT)
Dept: LAB | Facility: HOSPITAL | Age: 75
End: 2020-12-04
Attending: INTERNAL MEDICINE
Payer: MEDICARE

## 2020-12-04 DIAGNOSIS — E21.3 HYPERPARATHYROIDISM: ICD-10-CM

## 2020-12-04 DIAGNOSIS — E89.0 POSTABLATIVE HYPOTHYROIDISM: ICD-10-CM

## 2020-12-04 DIAGNOSIS — M85.852 OSTEOPENIA OF NECK OF LEFT FEMUR: ICD-10-CM

## 2020-12-04 LAB
25(OH)D3+25(OH)D2 SERPL-MCNC: 53 NG/ML (ref 30–96)
ALBUMIN SERPL BCP-MCNC: 4.2 G/DL (ref 3.5–5.2)
ALP SERPL-CCNC: 44 U/L (ref 55–135)
ALT SERPL W/O P-5'-P-CCNC: 18 U/L (ref 10–44)
ANION GAP SERPL CALC-SCNC: 12 MMOL/L (ref 8–16)
AST SERPL-CCNC: 27 U/L (ref 10–40)
BILIRUB SERPL-MCNC: 0.6 MG/DL (ref 0.1–1)
BUN SERPL-MCNC: 27 MG/DL (ref 8–23)
CALCIUM SERPL-MCNC: 9.4 MG/DL (ref 8.7–10.5)
CHLORIDE SERPL-SCNC: 98 MMOL/L (ref 95–110)
CO2 SERPL-SCNC: 27 MMOL/L (ref 23–29)
CREAT SERPL-MCNC: 1.3 MG/DL (ref 0.5–1.4)
EST. GFR  (AFRICAN AMERICAN): 46 ML/MIN/1.73 M^2
EST. GFR  (NON AFRICAN AMERICAN): 39.9 ML/MIN/1.73 M^2
GLUCOSE SERPL-MCNC: 96 MG/DL (ref 70–110)
POTASSIUM SERPL-SCNC: 4.9 MMOL/L (ref 3.5–5.1)
PROT SERPL-MCNC: 6.7 G/DL (ref 6–8.4)
PTH-INTACT SERPL-MCNC: 29 PG/ML (ref 9–77)
SODIUM SERPL-SCNC: 137 MMOL/L (ref 136–145)
T3 SERPL-MCNC: 64 NG/DL (ref 60–180)
T4 FREE SERPL-MCNC: 1.6 NG/DL (ref 0.71–1.51)
TSH SERPL DL<=0.005 MIU/L-ACNC: 0.93 UIU/ML (ref 0.34–5.6)

## 2020-12-04 PROCEDURE — 84480 ASSAY TRIIODOTHYRONINE (T3): CPT

## 2020-12-04 PROCEDURE — 80053 COMPREHEN METABOLIC PANEL: CPT

## 2020-12-04 PROCEDURE — 84439 ASSAY OF FREE THYROXINE: CPT

## 2020-12-04 PROCEDURE — 82306 VITAMIN D 25 HYDROXY: CPT

## 2020-12-04 PROCEDURE — 83970 ASSAY OF PARATHORMONE: CPT

## 2020-12-04 PROCEDURE — 36415 COLL VENOUS BLD VENIPUNCTURE: CPT

## 2020-12-04 PROCEDURE — 84443 ASSAY THYROID STIM HORMONE: CPT

## 2020-12-08 ENCOUNTER — PATIENT OUTREACH (OUTPATIENT)
Dept: ADMINISTRATIVE | Facility: OTHER | Age: 75
End: 2020-12-08

## 2020-12-08 NOTE — PROGRESS NOTES
Chart was reviewed for overdue [facility]active [facility] Encounters ([facility]ANG)  topics  Updates were requested from care everywhere  Health Maintenance was unable to be updated  LINKS immunization registry triggered

## 2020-12-10 ENCOUNTER — OFFICE VISIT (OUTPATIENT)
Dept: ENDOCRINOLOGY | Facility: CLINIC | Age: 75
End: 2020-12-10
Payer: MEDICARE

## 2020-12-10 VITALS
TEMPERATURE: 98 F | DIASTOLIC BLOOD PRESSURE: 60 MMHG | WEIGHT: 113.13 LBS | BODY MASS INDEX: 20.82 KG/M2 | SYSTOLIC BLOOD PRESSURE: 130 MMHG | HEART RATE: 88 BPM | HEIGHT: 62 IN | OXYGEN SATURATION: 97 %

## 2020-12-10 DIAGNOSIS — E89.0 POSTABLATIVE HYPOTHYROIDISM: ICD-10-CM

## 2020-12-10 DIAGNOSIS — E21.3 HYPERPARATHYROIDISM: Primary | ICD-10-CM

## 2020-12-10 DIAGNOSIS — M85.852 OSTEOPENIA OF NECK OF LEFT FEMUR: ICD-10-CM

## 2020-12-10 PROCEDURE — 99999 PR PBB SHADOW E&M-EST. PATIENT-LVL V: CPT | Mod: PBBFAC,,, | Performed by: INTERNAL MEDICINE

## 2020-12-10 PROCEDURE — 99999 PR PBB SHADOW E&M-EST. PATIENT-LVL V: ICD-10-PCS | Mod: PBBFAC,,, | Performed by: INTERNAL MEDICINE

## 2020-12-10 PROCEDURE — 99214 OFFICE O/P EST MOD 30 MIN: CPT | Mod: S$PBB,,, | Performed by: INTERNAL MEDICINE

## 2020-12-10 PROCEDURE — 99214 PR OFFICE/OUTPT VISIT, EST, LEVL IV, 30-39 MIN: ICD-10-PCS | Mod: S$PBB,,, | Performed by: INTERNAL MEDICINE

## 2020-12-10 PROCEDURE — 99215 OFFICE O/P EST HI 40 MIN: CPT | Mod: PBBFAC,PO | Performed by: INTERNAL MEDICINE

## 2020-12-10 NOTE — ASSESSMENT & PLAN NOTE
Hx Graves disease, s/p RAIA decades ago, now hypothyroid   - on synthroid 88, clinically euthyroid   - last labs normal TSH, slightly elevated free t4   - continue same dose   - recheck in a few months

## 2020-12-10 NOTE — PROGRESS NOTES
Subjective:      Chief Complaint: Hyperparathyroidism, Hypothyroidism, and Osteopenia      HPI: Suki Macias is a 75 y.o. female who is here for a follow-up evaluation for hyperparathyroidism. Last seen 7/23/2020.    Disease history:  Reports GI issues about 1.5 years ago. On steroids, didn't get better. Saw somebody else who found high calcium, prompting additional workup. Saw endocrine (Dr. Schmitz) to eval calcium, parathyroid.     Had elevated PTH for a while. Since at least 2018.   Calcium mildly elevated off and on, up to 11.6 at the highest, though last Ca normal.     Prior labs:        Lab Results   Component Value Date     CALCIUM 11.2 (H) 07/14/2020     CALCIUM 11.2 (H) 07/14/2020     ALBUMIN 3.8 07/14/2020     ALBUMIN 3.8 07/14/2020     CREATININE 1.1 07/14/2020     CREATININE 1.1 07/14/2020     CREATININE 1.1 07/14/2020     [id number] 31 03/13/2020     .0 (H) 07/14/2020    GFR 49.     Prior labs:            Lab Results   Component Value Date     CALCIUM 10.0 03/13/2020     ALBUMIN 4.0 04/22/2019     CREATININE 1.0 03/13/2020     [id number] 31 03/13/2020     .0 (H) 03/13/2020   Had 24 hr urine Ca of 140 (volume only 600, no creatinine at that time).     Repeat 24 hr urine 7/2020: Volume 500. Creat 800   calcium 192. Low, but with low volume need to compare with creatinine.  Serum Ca 11.2, alb 3.8, Cr 1.1   Ratio: 0.023   - Not suggestive of FHH     Neck US 7/8/2020:   Potential parathyroid adenoma left upper pole. 1.2x0.8x0.6 cm in size, hypoechoic.     DXA 11/2019: normal   Forearm tscore 0.8  DXA 10/2019: osteopenia. FRAX 11% major, 2.7% hip.   spine tscore +2.0   Left hip -1.8     Not smoking.  No FH hip fractures. +FH osteoporosis (patient's mother)    Pt also has hx hyperthyroidism s/p radiation about 35+years ago, then with post-ablative hypothyroidism. She has had some issues with getting dose regulated.     Lab Results   Component Value Date    TSH 0.929 12/04/2020     X0HZJVQ 64 12/04/2020    B9OEISO 16.3 (H) 06/19/2017    FREET4 1.60 (H) 12/04/2020     Thyroid medication: On 88 mcg/day Synthroid (brand medication).    Interval history:  Had parathyroid surgery October 2020. With [facility] in [place], [place]. No records here, but pt remembers they removed 2 parathyroid glands. Benign.    Medication: Citracal-D3, 2 pills per day.    Last labs:    Lab Results   Component Value Date    CALCIUM 9.4 12/04/2020    ALBUMIN 4.2 12/04/2020    CREATININE 1.3 12/04/2020    [id number] 53 12/04/2020    PTH 29.0 12/04/2020     Today, pt reports feeling well. Some trouble with brittle nails.   occasional palpitations when waking up. Gained a few lbs since surgery.  Rare/mild tremor    Reviewed past medical, family, social history and updated as appropriate.    Review of Systems   Constitutional: Negative for fatigue and unexpected weight change (gained a few lbs since surgery).   HENT: Negative for trouble swallowing.    Eyes: Negative for visual disturbance.   Respiratory: Negative for shortness of breath.    Cardiovascular: Positive for palpitations (sometimes in the morning when waking up, not other times).   Gastrointestinal: Negative for constipation and diarrhea.   Endocrine: Negative for cold intolerance, heat intolerance, polydipsia and polyuria.        Feels cold a lot   Genitourinary: Negative for frequency.   Musculoskeletal: Negative for back pain.   Neurological: Positive for tremors (slight/chronic). Negative for dizziness, weakness and light-headedness.   Psychiatric/Behavioral: Positive for sleep disturbance (sleeps a lot).     Objective:     Vitals:    12/10/20 0957   BP: 130/60   Pulse: 88   Temp: 97.7 °F (36.5 °C)     BP Readings from Last 5 Encounters:   12/10/20 130/60   08/03/20 (!) 171/81   07/23/20 (!) 162/70   07/13/20 135/65   06/22/20 (!) 122/59     Physical Exam  Vitals signs reviewed.   Constitutional:       General: She is not in acute distress.  Neck:       Thyroid: No thyromegaly.      Comments: +scar  Cardiovascular:      Heart sounds: Normal heart sounds.   Pulmonary:      Effort: Pulmonary effort is normal.         Wt Readings from Last 10 Encounters:   12/10/20 0957 51.3 kg (113 lb 1.5 oz)   08/03/20 1029 49 kg (108 lb)   07/23/20 1129 49.5 kg (109 lb 0.3 oz)   07/13/20 1357 48.1 kg (106 lb)   06/22/20 1122 49.5 kg (109 lb 1.6 oz)   03/19/20 1311 51 kg (112 lb 7 oz)   03/18/20 1022 50.8 kg (112 lb)   01/27/20 1514 51.7 kg (114 lb)   12/23/19 1040 51.8 kg (114 lb 3.2 oz)   11/06/19 0902 48.1 kg (106 lb)       Lab Results   Component Value Date    HGBA1C 5.7 (H) 04/22/2019     Lab Results   Component Value Date    CHOL 275 (H) 04/22/2019    HDL 74 04/22/2019    LDLCALC 157 (H) 04/22/2019    TRIG 271 (H) 04/22/2019    CHOLHDL 3.7 04/22/2019     Lab Results   Component Value Date     12/04/2020    K 4.9 12/04/2020    CL 98 12/04/2020    CO2 27 12/04/2020    GLU 96 12/04/2020    BUN 27 (H) 12/04/2020    CREATININE 1.3 12/04/2020    CALCIUM 9.4 12/04/2020    PROT 6.7 12/04/2020    ALBUMIN 4.2 12/04/2020    BILITOT 0.6 12/04/2020    ALKPHOS 44 (L) 12/04/2020    AST 27 12/04/2020    ALT 18 12/04/2020    ANIONGAP 12 12/04/2020    ESTGFRAFRICA 46.0 (A) 12/04/2020    EGFRNONAA 39.9 (A) 12/04/2020    TSH 0.929 12/04/2020        Assessment/Plan:     Hyperparathyroidism  Primary hyperparathyroidism.   - with CKD, osteopenia   - s/p 2 gland parathyroidectomy 10/2020 in [place]   - last PTH, calcium normal   - can decrease calcium further to normal supplement levels for osteopenia, per below.   - recheck calcium in another few months to confirm stability, consider monitor PTH after another 1-2 years.      Osteopenia of neck of left femur  Seen on last DXA   - no falls/fractures.   - FRAX not high enough to recommend treatment at this time   - encourage pt continue vit D (1,000 units/day) and calcium (1200 mg per day)   - recheck DXA next year      Hypothyroid  Hx  Graves disease, s/p RAIA decades ago, now hypothyroid   - on synthroid 88, clinically euthyroid   - last labs normal TSH, slightly elevated free t4   - continue same dose   - recheck in a few months      Labs in 4 months for CMP, thyroid. dxa before next visit.  Follow up in about 1 year (around 12/10/2021) for imaging review, further monitoring, lab review.      Aman Sood MD  Endocrinology

## 2020-12-10 NOTE — PATIENT INSTRUCTIONS
For the thyroid, continue the same dose.    For the bones, continue calcium and vitamin D.  Goal calcium 1200 mg per day   vitamin D 1,000 units per day.    In vitamins and/or diet.    Recheck bone density next year around November/December.      Common Thyroid Problems    The thyroid is a gland in the neck. It makes thyroid hormone. A hormone is a chemical messenger for the body. If there is a problem with the thyroid, the level of thyroid hormone may change. This can lead to symptoms.  Hypothyroidism  This is when the thyroid gland doesnt make enough hormone. The most common cause of hypothyroidism is Hashimoto thyroiditis. This occurs when the bodys immune system attacks the thyroid gland. Hypothyroidism may also occur if theres a severe deficiency of iodine in the body. The thyroid needs iodine to make hormone. Problems with the pituitary gland can lead to not enough production of thyroid hormone. Hypothyroidism will also occur if the thyroid gland is removed during surgery.  Common symptoms include:  · Low energy and tiredness  · Depression  · Feeling cold  · Muscle pain  · Slowed thinking      · Constipation  · Heavier menstrual periods with prolonged bleeding   · Weight gain  · Dry and brittle skin, hair, nails  · Excessive sleepiness  Hyperthyroidism  This is when the thyroid gland makes too much hormone. The most common cause is Graves disease. This is due to the bodys immune system telling the thyroid to make too much hormone. Another cause is a small bump (nodule) in the thyroid gland. This can cause hyperthyroidism if the cells in the nodule make too much thyroid hormone and stop hormone production in the rest of the thyroid gland.  Common symptoms include:  · Shaking, nervousness, irritability  · Feeling hot  · A rapid, irregular heartbeat  · Muscle weakness, fatigue  · More frequent bowel movements  · Eastern, lighter menstrual periods  · Weight loss  · Hair loss  · Bulging eyes  Nodules  Nodules  are lumps of tissue in the thyroid gland. Most often, the cause of nodules isnt known. But they may be more common in people whove had medical radiation to the head or neck. Sometimes nodules can be felt on the outside of the neck. Most of the time, cause no symptoms and dont affect the amount of thyroid hormone. Most nodules are not cancer. But sometimes a nodule may be cancer.  What is a goiter?  A goiter is the enlargement of the thyroid gland. When the gland enlarges, you may see or feel a swelling on your neck. A goiter can develop in someone with a normal thyroid, overactive thyroid, or underactive thyroid.   Date Last Reviewed: 11/1/2016  © 0012-4044 [employer]. [address] [place] [address], [place] [address]. All rights reserved. This information is not intended as a substitute for professional medical care. Always follow your healthcare professional's instructions.

## 2020-12-10 NOTE — ASSESSMENT & PLAN NOTE
Primary hyperparathyroidism.   - with CKD, osteopenia   - s/p 2 gland parathyroidectomy 10/2020 in [place]   - last PTH, calcium normal   - can decrease calcium further to normal supplement levels for osteopenia, per below.   - recheck calcium in another few months to confirm stability, consider monitor PTH after another 1-2 years.

## 2020-12-10 NOTE — ASSESSMENT & PLAN NOTE
Seen on last DXA   - no falls/fractures.   - FRAX not high enough to recommend treatment at this time   - encourage pt continue vit D (1,000 units/day) and calcium (1200 mg per day)   - recheck DXA next year

## 2020-12-11 ENCOUNTER — PATIENT MESSAGE (OUTPATIENT)
Dept: OTHER | Facility: OTHER | Age: 75
End: 2020-12-11

## 2021-02-23 RX ORDER — TRAMADOL HYDROCHLORIDE 50 MG/1
TABLET ORAL
Qty: 90 EACH | Refills: 0 | Status: SHIPPED | OUTPATIENT
Start: 2021-02-23 | End: 2021-03-23

## 2021-02-24 RX ORDER — CIPROFLOXACIN 500 MG/1
500 TABLET ORAL 2 TIMES DAILY
Qty: 20 TABLET | Refills: 0 | Status: SHIPPED | OUTPATIENT
Start: 2021-02-24 | End: 2021-03-06

## 2021-02-24 RX ORDER — CIPROFLOXACIN 250 MG/1
500 TABLET, FILM COATED ORAL
Status: CANCELLED | OUTPATIENT
Start: 2021-02-24 | End: 2021-02-24

## 2021-02-24 RX ORDER — DIPHENOXYLATE HYDROCHLORIDE AND ATROPINE SULFATE 2.5; .025 MG/1; MG/1
1 TABLET ORAL 4 TIMES DAILY PRN
Qty: 120 TABLET | Refills: 0 | Status: SHIPPED | OUTPATIENT
Start: 2021-02-24 | End: 2022-02-10 | Stop reason: ALTCHOICE

## 2021-03-04 DIAGNOSIS — Z11.59 NEED FOR HEPATITIS C SCREENING TEST: ICD-10-CM

## 2021-03-08 ENCOUNTER — PATIENT OUTREACH (OUTPATIENT)
Dept: ADMINISTRATIVE | Facility: OTHER | Age: 76
End: 2021-03-08

## 2021-03-09 ENCOUNTER — TELEPHONE (OUTPATIENT)
Dept: GASTROENTEROLOGY | Facility: CLINIC | Age: 76
End: 2021-03-09

## 2021-03-09 ENCOUNTER — TELEPHONE (OUTPATIENT)
Dept: PAIN MEDICINE | Facility: CLINIC | Age: 76
End: 2021-03-09

## 2021-03-09 ENCOUNTER — OFFICE VISIT (OUTPATIENT)
Dept: GASTROENTEROLOGY | Facility: CLINIC | Age: 76
End: 2021-03-09
Payer: MEDICARE

## 2021-03-09 VITALS
DIASTOLIC BLOOD PRESSURE: 76 MMHG | BODY MASS INDEX: 20.36 KG/M2 | HEIGHT: 62 IN | WEIGHT: 110.63 LBS | SYSTOLIC BLOOD PRESSURE: 133 MMHG | HEART RATE: 74 BPM | OXYGEN SATURATION: 97 % | TEMPERATURE: 98 F | RESPIRATION RATE: 17 BRPM

## 2021-03-09 DIAGNOSIS — R19.7 DIARRHEA, UNSPECIFIED TYPE: ICD-10-CM

## 2021-03-09 DIAGNOSIS — K21.9 GASTROESOPHAGEAL REFLUX DISEASE WITHOUT ESOPHAGITIS: ICD-10-CM

## 2021-03-09 DIAGNOSIS — K21.9 GASTROESOPHAGEAL REFLUX DISEASE WITHOUT ESOPHAGITIS: Primary | ICD-10-CM

## 2021-03-09 DIAGNOSIS — K44.9 HIATAL HERNIA: ICD-10-CM

## 2021-03-09 DIAGNOSIS — E21.3 HYPERPARATHYROIDISM: ICD-10-CM

## 2021-03-09 DIAGNOSIS — K57.30 DIVERTICULOSIS OF LARGE INTESTINE WITHOUT HEMORRHAGE: ICD-10-CM

## 2021-03-09 DIAGNOSIS — K64.9 HEMORRHOIDS, UNSPECIFIED HEMORRHOID TYPE: Primary | ICD-10-CM

## 2021-03-09 PROCEDURE — 99214 OFFICE O/P EST MOD 30 MIN: CPT | Mod: S$PBB,,, | Performed by: INTERNAL MEDICINE

## 2021-03-09 PROCEDURE — 99999 PR PBB SHADOW E&M-EST. PATIENT-LVL III: CPT | Mod: PBBFAC,,, | Performed by: INTERNAL MEDICINE

## 2021-03-09 PROCEDURE — 99999 PR PBB SHADOW E&M-EST. PATIENT-LVL III: ICD-10-PCS | Mod: PBBFAC,,, | Performed by: INTERNAL MEDICINE

## 2021-03-09 PROCEDURE — 99213 OFFICE O/P EST LOW 20 MIN: CPT | Mod: PBBFAC,PN | Performed by: INTERNAL MEDICINE

## 2021-03-09 PROCEDURE — 99214 PR OFFICE/OUTPT VISIT, EST, LEVL IV, 30-39 MIN: ICD-10-PCS | Mod: S$PBB,,, | Performed by: INTERNAL MEDICINE

## 2021-03-09 RX ORDER — CALCIUM CITRATE/VITAMIN D3 200MG-6.25
TABLET ORAL 2 TIMES DAILY
COMMUNITY
Start: 2020-10-22

## 2021-03-09 RX ORDER — VIT C/E/ZN/COPPR/LUTEIN/ZEAXAN 250MG-90MG
CAPSULE ORAL
COMMUNITY
Start: 2020-10-05

## 2021-03-09 RX ORDER — CIPROFLOXACIN 500 MG/1
500 TABLET ORAL 2 TIMES DAILY
COMMUNITY
Start: 2021-02-24 | End: 2021-06-14

## 2021-03-09 RX ORDER — OMEPRAZOLE 20 MG/1
CAPSULE, DELAYED RELEASE ORAL
COMMUNITY
Start: 2020-12-22 | End: 2021-06-29

## 2021-03-11 ENCOUNTER — LAB VISIT (OUTPATIENT)
Dept: LAB | Facility: HOSPITAL | Age: 76
End: 2021-03-11
Attending: INTERNAL MEDICINE
Payer: MEDICARE

## 2021-03-11 DIAGNOSIS — E21.3 HYPERPARATHYROIDISM: ICD-10-CM

## 2021-03-11 DIAGNOSIS — Z11.59 NEED FOR HEPATITIS C SCREENING TEST: ICD-10-CM

## 2021-03-11 DIAGNOSIS — K21.9 GASTROESOPHAGEAL REFLUX DISEASE WITHOUT ESOPHAGITIS: ICD-10-CM

## 2021-03-11 LAB
ALBUMIN SERPL BCP-MCNC: 4.4 G/DL (ref 3.5–5.2)
ALP SERPL-CCNC: 37 U/L (ref 55–135)
ALT SERPL W/O P-5'-P-CCNC: 12 U/L (ref 10–44)
ANION GAP SERPL CALC-SCNC: 9 MMOL/L (ref 8–16)
AST SERPL-CCNC: 20 U/L (ref 10–40)
BILIRUB SERPL-MCNC: 0.7 MG/DL (ref 0.1–1)
BUN SERPL-MCNC: 38 MG/DL (ref 8–23)
CALCIUM SERPL-MCNC: 9.5 MG/DL (ref 8.7–10.5)
CHLORIDE SERPL-SCNC: 104 MMOL/L (ref 95–110)
CO2 SERPL-SCNC: 28 MMOL/L (ref 23–29)
CREAT SERPL-MCNC: 1.5 MG/DL (ref 0.5–1.4)
CRP SERPL-MCNC: 0.77 MG/DL (ref 0–0.75)
EST. GFR  (AFRICAN AMERICAN): 38.5 ML/MIN/1.73 M^2
EST. GFR  (NON AFRICAN AMERICAN): 33.4 ML/MIN/1.73 M^2
GLUCOSE SERPL-MCNC: 87 MG/DL (ref 70–110)
POTASSIUM SERPL-SCNC: 4.1 MMOL/L (ref 3.5–5.1)
PROT SERPL-MCNC: 7 G/DL (ref 6–8.4)
SODIUM SERPL-SCNC: 141 MMOL/L (ref 136–145)
T4 FREE SERPL-MCNC: 1.51 NG/DL (ref 0.71–1.51)
TSH SERPL DL<=0.005 MIU/L-ACNC: 3.02 UIU/ML (ref 0.34–5.6)

## 2021-03-11 PROCEDURE — 86803 HEPATITIS C AB TEST: CPT | Performed by: FAMILY MEDICINE

## 2021-03-11 PROCEDURE — 87507 IADNA-DNA/RNA PROBE TQ 12-25: CPT | Performed by: FAMILY MEDICINE

## 2021-03-11 PROCEDURE — 82656 EL-1 FECAL QUAL/SEMIQ: CPT | Performed by: FAMILY MEDICINE

## 2021-03-11 PROCEDURE — 86140 C-REACTIVE PROTEIN: CPT | Performed by: FAMILY MEDICINE

## 2021-03-11 PROCEDURE — 84443 ASSAY THYROID STIM HORMONE: CPT | Performed by: INTERNAL MEDICINE

## 2021-03-11 PROCEDURE — 83993 ASSAY FOR CALPROTECTIN FECAL: CPT | Performed by: FAMILY MEDICINE

## 2021-03-11 PROCEDURE — 84439 ASSAY OF FREE THYROXINE: CPT | Performed by: INTERNAL MEDICINE

## 2021-03-11 PROCEDURE — 80053 COMPREHEN METABOLIC PANEL: CPT | Performed by: INTERNAL MEDICINE

## 2021-03-11 PROCEDURE — 83516 IMMUNOASSAY NONANTIBODY: CPT | Mod: 59 | Performed by: FAMILY MEDICINE

## 2021-03-12 LAB — HCV AB SERPL QL IA: NEGATIVE

## 2021-03-15 ENCOUNTER — TELEPHONE (OUTPATIENT)
Dept: ENDOCRINOLOGY | Facility: CLINIC | Age: 76
End: 2021-03-15

## 2021-03-15 ENCOUNTER — PATIENT MESSAGE (OUTPATIENT)
Dept: ENDOCRINOLOGY | Facility: CLINIC | Age: 76
End: 2021-03-15

## 2021-03-15 DIAGNOSIS — E89.0 POSTABLATIVE HYPOTHYROIDISM: ICD-10-CM

## 2021-03-15 DIAGNOSIS — E21.3 HYPERPARATHYROIDISM: Primary | ICD-10-CM

## 2021-03-15 LAB
ELASTASE 1, FECAL: 376 MCG/G
GLIADIN PEPTIDE IGA SER-ACNC: 3 UNITS
GLIADIN PEPTIDE IGG SER-ACNC: 2 UNITS
IGA SERPL-MCNC: 122 MG/DL (ref 70–400)
TTG IGA SER-ACNC: 3 UNITS
TTG IGG SER-ACNC: 2 UNITS

## 2021-03-16 LAB
GPP - ADENOVIRUS 40/41: NOT DETECTED
GPP - CAMPYLOBACTER: NOT DETECTED
GPP - CRYPTOSPORIDIUM: NOT DETECTED
GPP - E COLI O157: NOT DETECTED
GPP - ENTAMOEBA HISTOLYTICA: NOT DETECTED
GPP - ENTEROTOXIGENIC E COLI (ETEC): NOT DETECTED
GPP - GIARDIA LAMBLIA: NOT DETECTED
GPP - NOROVIRUS GI/GII: NOT DETECTED
GPP - ROTAVIRUS A: NOT DETECTED
GPP - SALMONELLA: NOT DETECTED
GPP - SHIGELLA: NOT DETECTED
GPP - VIBRIO CHOLERA: NOT DETECTED
GPP - YERSINIA ENTEROCOLITICA: NOT DETECTED
LACTATE PLASV-SCNC: NOT DETECTED MMOL/L

## 2021-03-17 LAB — CALPROTECTIN STL-MCNT: 379.4 MCG/G

## 2021-03-22 ENCOUNTER — OFFICE VISIT (OUTPATIENT)
Dept: GASTROENTEROLOGY | Facility: CLINIC | Age: 76
End: 2021-03-22
Payer: MEDICARE

## 2021-03-22 VITALS
BODY MASS INDEX: 20.48 KG/M2 | WEIGHT: 111.31 LBS | TEMPERATURE: 99 F | DIASTOLIC BLOOD PRESSURE: 82 MMHG | RESPIRATION RATE: 17 BRPM | OXYGEN SATURATION: 100 % | HEART RATE: 72 BPM | SYSTOLIC BLOOD PRESSURE: 160 MMHG | HEIGHT: 62 IN

## 2021-03-22 DIAGNOSIS — K64.0 GRADE I HEMORRHOIDS: ICD-10-CM

## 2021-03-22 DIAGNOSIS — K21.9 GASTROESOPHAGEAL REFLUX DISEASE WITHOUT ESOPHAGITIS: ICD-10-CM

## 2021-03-22 DIAGNOSIS — K57.30 DIVERTICULOSIS OF LARGE INTESTINE WITHOUT HEMORRHAGE: ICD-10-CM

## 2021-03-22 DIAGNOSIS — K44.9 HIATAL HERNIA: Primary | ICD-10-CM

## 2021-03-22 DIAGNOSIS — R19.7 DIARRHEA, UNSPECIFIED TYPE: ICD-10-CM

## 2021-03-22 PROCEDURE — 99214 OFFICE O/P EST MOD 30 MIN: CPT | Mod: PBBFAC,PN | Performed by: INTERNAL MEDICINE

## 2021-03-22 PROCEDURE — 99999 PR PBB SHADOW E&M-EST. PATIENT-LVL IV: ICD-10-PCS | Mod: PBBFAC,,, | Performed by: INTERNAL MEDICINE

## 2021-03-22 PROCEDURE — 99214 OFFICE O/P EST MOD 30 MIN: CPT | Mod: S$PBB,,, | Performed by: INTERNAL MEDICINE

## 2021-03-22 PROCEDURE — 99214 PR OFFICE/OUTPT VISIT, EST, LEVL IV, 30-39 MIN: ICD-10-PCS | Mod: S$PBB,,, | Performed by: INTERNAL MEDICINE

## 2021-03-22 PROCEDURE — 99999 PR PBB SHADOW E&M-EST. PATIENT-LVL IV: CPT | Mod: PBBFAC,,, | Performed by: INTERNAL MEDICINE

## 2021-03-23 DIAGNOSIS — E78.2 HYPERCHOLESTEROLEMIA WITH HYPERTRIGLYCERIDEMIA: ICD-10-CM

## 2021-03-24 RX ORDER — FENOFIBRIC ACID 135 MG/1
CAPSULE, DELAYED RELEASE ORAL
Qty: 90 CAPSULE | Refills: 3 | Status: SHIPPED | OUTPATIENT
Start: 2021-03-24 | End: 2022-03-21

## 2021-04-23 ENCOUNTER — OFFICE VISIT (OUTPATIENT)
Dept: GASTROENTEROLOGY | Facility: CLINIC | Age: 76
End: 2021-04-23
Payer: MEDICARE

## 2021-04-23 ENCOUNTER — LAB VISIT (OUTPATIENT)
Dept: LAB | Facility: HOSPITAL | Age: 76
End: 2021-04-23
Attending: INTERNAL MEDICINE
Payer: MEDICARE

## 2021-04-23 VITALS
HEIGHT: 62 IN | WEIGHT: 113 LBS | TEMPERATURE: 98 F | OXYGEN SATURATION: 97 % | HEART RATE: 70 BPM | SYSTOLIC BLOOD PRESSURE: 141 MMHG | RESPIRATION RATE: 18 BRPM | BODY MASS INDEX: 20.8 KG/M2 | DIASTOLIC BLOOD PRESSURE: 76 MMHG

## 2021-04-23 DIAGNOSIS — R11.0 NAUSEA: ICD-10-CM

## 2021-04-23 DIAGNOSIS — R10.11 RIGHT UPPER QUADRANT PAIN: ICD-10-CM

## 2021-04-23 DIAGNOSIS — R19.7 DIARRHEA, UNSPECIFIED TYPE: ICD-10-CM

## 2021-04-23 DIAGNOSIS — K57.92 DIVERTICULITIS: ICD-10-CM

## 2021-04-23 DIAGNOSIS — K64.0 GRADE I HEMORRHOIDS: ICD-10-CM

## 2021-04-23 DIAGNOSIS — K44.9 HIATAL HERNIA: ICD-10-CM

## 2021-04-23 DIAGNOSIS — K57.30 DIVERTICULOSIS OF LARGE INTESTINE WITHOUT HEMORRHAGE: ICD-10-CM

## 2021-04-23 DIAGNOSIS — K21.9 GASTROESOPHAGEAL REFLUX DISEASE WITHOUT ESOPHAGITIS: ICD-10-CM

## 2021-04-23 DIAGNOSIS — K64.9 HEMORRHOIDS, UNSPECIFIED HEMORRHOID TYPE: ICD-10-CM

## 2021-04-23 DIAGNOSIS — R19.7 DIARRHEA, UNSPECIFIED TYPE: Primary | ICD-10-CM

## 2021-04-23 LAB
ALBUMIN SERPL BCP-MCNC: 4.1 G/DL (ref 3.5–5.2)
ALP SERPL-CCNC: 40 U/L (ref 55–135)
ALT SERPL W/O P-5'-P-CCNC: 11 U/L (ref 10–44)
ANION GAP SERPL CALC-SCNC: 13 MMOL/L (ref 8–16)
AST SERPL-CCNC: 20 U/L (ref 10–40)
BASOPHILS # BLD AUTO: 0.03 K/UL (ref 0–0.2)
BASOPHILS NFR BLD: 0.4 % (ref 0–1.9)
BILIRUB SERPL-MCNC: 1 MG/DL (ref 0.1–1)
BUN SERPL-MCNC: 27 MG/DL (ref 8–23)
CALCIUM SERPL-MCNC: 9.5 MG/DL (ref 8.7–10.5)
CHLORIDE SERPL-SCNC: 105 MMOL/L (ref 95–110)
CO2 SERPL-SCNC: 26 MMOL/L (ref 23–29)
CREAT SERPL-MCNC: 1.6 MG/DL (ref 0.5–1.4)
DIFFERENTIAL METHOD: ABNORMAL
EOSINOPHIL # BLD AUTO: 0.1 K/UL (ref 0–0.5)
EOSINOPHIL NFR BLD: 1 % (ref 0–8)
ERYTHROCYTE [DISTWIDTH] IN BLOOD BY AUTOMATED COUNT: 14.5 % (ref 11.5–14.5)
EST. GFR  (AFRICAN AMERICAN): 35.6 ML/MIN/1.73 M^2
EST. GFR  (NON AFRICAN AMERICAN): 30.9 ML/MIN/1.73 M^2
GLUCOSE SERPL-MCNC: 88 MG/DL (ref 70–110)
HCT VFR BLD AUTO: 41 % (ref 37–48.5)
HGB BLD-MCNC: 13 G/DL (ref 12–16)
IMM GRANULOCYTES # BLD AUTO: 0.02 K/UL (ref 0–0.04)
IMM GRANULOCYTES NFR BLD AUTO: 0.3 % (ref 0–0.5)
LYMPHOCYTES # BLD AUTO: 1.8 K/UL (ref 1–4.8)
LYMPHOCYTES NFR BLD: 23 % (ref 18–48)
MCH RBC QN AUTO: 29.2 PG (ref 27–31)
MCHC RBC AUTO-ENTMCNC: 31.7 G/DL (ref 32–36)
MCV RBC AUTO: 92 FL (ref 82–98)
MONOCYTES # BLD AUTO: 0.9 K/UL (ref 0.3–1)
MONOCYTES NFR BLD: 11.9 % (ref 4–15)
NEUTROPHILS # BLD AUTO: 4.8 K/UL (ref 1.8–7.7)
NEUTROPHILS NFR BLD: 63.4 % (ref 38–73)
NRBC BLD-RTO: 0 /100 WBC
PLATELET # BLD AUTO: 444 K/UL (ref 150–450)
PMV BLD AUTO: 10.6 FL (ref 9.2–12.9)
POTASSIUM SERPL-SCNC: 5.4 MMOL/L (ref 3.5–5.1)
PROT SERPL-MCNC: 6.8 G/DL (ref 6–8.4)
RBC # BLD AUTO: 4.45 M/UL (ref 4–5.4)
SODIUM SERPL-SCNC: 144 MMOL/L (ref 136–145)
WBC # BLD AUTO: 7.64 K/UL (ref 3.9–12.7)

## 2021-04-23 PROCEDURE — 36415 COLL VENOUS BLD VENIPUNCTURE: CPT | Performed by: INTERNAL MEDICINE

## 2021-04-23 PROCEDURE — 85025 COMPLETE CBC W/AUTO DIFF WBC: CPT | Performed by: INTERNAL MEDICINE

## 2021-04-23 PROCEDURE — 99999 PR PBB SHADOW E&M-EST. PATIENT-LVL V: CPT | Mod: PBBFAC,,, | Performed by: INTERNAL MEDICINE

## 2021-04-23 PROCEDURE — 99214 OFFICE O/P EST MOD 30 MIN: CPT | Mod: S$PBB,,, | Performed by: INTERNAL MEDICINE

## 2021-04-23 PROCEDURE — 99215 OFFICE O/P EST HI 40 MIN: CPT | Mod: PBBFAC,PN | Performed by: INTERNAL MEDICINE

## 2021-04-23 PROCEDURE — 99999 PR PBB SHADOW E&M-EST. PATIENT-LVL V: ICD-10-PCS | Mod: PBBFAC,,, | Performed by: INTERNAL MEDICINE

## 2021-04-23 PROCEDURE — 99214 PR OFFICE/OUTPT VISIT, EST, LEVL IV, 30-39 MIN: ICD-10-PCS | Mod: S$PBB,,, | Performed by: INTERNAL MEDICINE

## 2021-04-23 PROCEDURE — 80053 COMPREHEN METABOLIC PANEL: CPT | Performed by: INTERNAL MEDICINE

## 2021-04-23 RX ORDER — ONDANSETRON 4 MG/1
4 TABLET, ORALLY DISINTEGRATING ORAL EVERY 6 HOURS PRN
Qty: 60 TABLET | Refills: 0 | Status: SHIPPED | OUTPATIENT
Start: 2021-04-23 | End: 2021-05-07 | Stop reason: SDUPTHER

## 2021-04-23 RX ORDER — METRONIDAZOLE 500 MG/1
500 TABLET ORAL 4 TIMES DAILY
Qty: 40 TABLET | Refills: 0 | Status: SHIPPED | OUTPATIENT
Start: 2021-04-23 | End: 2021-06-14

## 2021-04-24 ENCOUNTER — LAB VISIT (OUTPATIENT)
Dept: LAB | Facility: HOSPITAL | Age: 76
End: 2021-04-24
Attending: INTERNAL MEDICINE
Payer: MEDICARE

## 2021-04-24 DIAGNOSIS — R19.7 DIARRHEA, UNSPECIFIED TYPE: ICD-10-CM

## 2021-04-24 PROCEDURE — 87507 IADNA-DNA/RNA PROBE TQ 12-25: CPT | Performed by: INTERNAL MEDICINE

## 2021-04-26 ENCOUNTER — TELEPHONE (OUTPATIENT)
Dept: GASTROENTEROLOGY | Facility: CLINIC | Age: 76
End: 2021-04-26

## 2021-04-28 ENCOUNTER — TELEPHONE (OUTPATIENT)
Dept: GASTROENTEROLOGY | Facility: CLINIC | Age: 76
End: 2021-04-28

## 2021-05-05 ENCOUNTER — PATIENT OUTREACH (OUTPATIENT)
Dept: ADMINISTRATIVE | Facility: OTHER | Age: 76
End: 2021-05-05

## 2021-05-07 ENCOUNTER — OFFICE VISIT (OUTPATIENT)
Dept: GASTROENTEROLOGY | Facility: CLINIC | Age: 76
End: 2021-05-07
Payer: MEDICARE

## 2021-05-07 VITALS
BODY MASS INDEX: 20.09 KG/M2 | DIASTOLIC BLOOD PRESSURE: 76 MMHG | HEIGHT: 62 IN | HEART RATE: 71 BPM | SYSTOLIC BLOOD PRESSURE: 137 MMHG | RESPIRATION RATE: 15 BRPM | OXYGEN SATURATION: 98 % | WEIGHT: 109.19 LBS

## 2021-05-07 DIAGNOSIS — K57.92 DIVERTICULITIS: ICD-10-CM

## 2021-05-07 DIAGNOSIS — R19.7 DIARRHEA, UNSPECIFIED TYPE: ICD-10-CM

## 2021-05-07 DIAGNOSIS — K21.9 GASTROESOPHAGEAL REFLUX DISEASE WITHOUT ESOPHAGITIS: ICD-10-CM

## 2021-05-07 DIAGNOSIS — K57.30 DIVERTICULOSIS OF LARGE INTESTINE WITHOUT HEMORRHAGE: ICD-10-CM

## 2021-05-07 DIAGNOSIS — R10.11 RIGHT UPPER QUADRANT PAIN: Primary | ICD-10-CM

## 2021-05-07 DIAGNOSIS — R11.0 NAUSEA: ICD-10-CM

## 2021-05-07 PROCEDURE — 99999 PR PBB SHADOW E&M-EST. PATIENT-LVL V: ICD-10-PCS | Mod: PBBFAC,,, | Performed by: INTERNAL MEDICINE

## 2021-05-07 PROCEDURE — 99215 OFFICE O/P EST HI 40 MIN: CPT | Mod: PBBFAC,PN | Performed by: INTERNAL MEDICINE

## 2021-05-07 PROCEDURE — 99999 PR PBB SHADOW E&M-EST. PATIENT-LVL V: CPT | Mod: PBBFAC,,, | Performed by: INTERNAL MEDICINE

## 2021-05-07 PROCEDURE — 99214 OFFICE O/P EST MOD 30 MIN: CPT | Mod: S$PBB,,, | Performed by: INTERNAL MEDICINE

## 2021-05-07 PROCEDURE — 99214 PR OFFICE/OUTPT VISIT, EST, LEVL IV, 30-39 MIN: ICD-10-PCS | Mod: S$PBB,,, | Performed by: INTERNAL MEDICINE

## 2021-05-11 ENCOUNTER — TELEPHONE (OUTPATIENT)
Dept: GASTROENTEROLOGY | Facility: CLINIC | Age: 76
End: 2021-05-11

## 2021-05-11 RX ORDER — ONDANSETRON 4 MG/1
4 TABLET, ORALLY DISINTEGRATING ORAL EVERY 6 HOURS PRN
Qty: 60 TABLET | Refills: 0 | Status: SHIPPED | OUTPATIENT
Start: 2021-05-11 | End: 2022-11-07

## 2021-05-11 RX ORDER — SUCRALFATE 1 G/1
1 TABLET ORAL
Qty: 90 TABLET | Refills: 11 | Status: SHIPPED | OUTPATIENT
Start: 2021-05-11 | End: 2021-08-23

## 2021-05-13 ENCOUNTER — HOSPITAL ENCOUNTER (OUTPATIENT)
Dept: RADIOLOGY | Facility: HOSPITAL | Age: 76
Discharge: HOME | End: 2021-05-13
Attending: INTERNAL MEDICINE
Payer: MEDICARE

## 2021-05-13 ENCOUNTER — TELEPHONE (OUTPATIENT)
Dept: GASTROENTEROLOGY | Facility: CLINIC | Age: 76
End: 2021-05-13

## 2021-05-13 DIAGNOSIS — R10.11 RIGHT UPPER QUADRANT PAIN: ICD-10-CM

## 2021-05-13 DIAGNOSIS — K21.9 GASTROESOPHAGEAL REFLUX DISEASE WITHOUT ESOPHAGITIS: ICD-10-CM

## 2021-05-13 PROCEDURE — 76700 US EXAM ABDOM COMPLETE: CPT | Mod: 26,,, | Performed by: RADIOLOGY

## 2021-05-13 PROCEDURE — 76700 US ABDOMEN COMPLETE: ICD-10-PCS | Mod: 26,,, | Performed by: RADIOLOGY

## 2021-05-13 PROCEDURE — 76700 US EXAM ABDOM COMPLETE: CPT | Mod: TC

## 2021-05-21 ENCOUNTER — OFFICE VISIT (OUTPATIENT)
Dept: GASTROENTEROLOGY | Facility: CLINIC | Age: 76
End: 2021-05-21
Payer: MEDICARE

## 2021-05-21 VITALS
HEIGHT: 62 IN | SYSTOLIC BLOOD PRESSURE: 134 MMHG | WEIGHT: 108 LBS | BODY MASS INDEX: 19.88 KG/M2 | DIASTOLIC BLOOD PRESSURE: 75 MMHG | HEART RATE: 65 BPM | OXYGEN SATURATION: 97 % | RESPIRATION RATE: 15 BRPM

## 2021-05-21 DIAGNOSIS — K44.9 HIATAL HERNIA: ICD-10-CM

## 2021-05-21 DIAGNOSIS — K64.9 HEMORRHOIDS, UNSPECIFIED HEMORRHOID TYPE: ICD-10-CM

## 2021-05-21 DIAGNOSIS — K21.9 GASTROESOPHAGEAL REFLUX DISEASE WITHOUT ESOPHAGITIS: ICD-10-CM

## 2021-05-21 DIAGNOSIS — K64.0 GRADE I HEMORRHOIDS: ICD-10-CM

## 2021-05-21 DIAGNOSIS — R11.0 NAUSEA: ICD-10-CM

## 2021-05-21 DIAGNOSIS — K57.92 DIVERTICULITIS: Primary | ICD-10-CM

## 2021-05-21 PROCEDURE — 99214 PR OFFICE/OUTPT VISIT, EST, LEVL IV, 30-39 MIN: ICD-10-PCS | Mod: S$PBB,,, | Performed by: INTERNAL MEDICINE

## 2021-05-21 PROCEDURE — 99214 OFFICE O/P EST MOD 30 MIN: CPT | Mod: S$PBB,,, | Performed by: INTERNAL MEDICINE

## 2021-05-21 PROCEDURE — 99214 OFFICE O/P EST MOD 30 MIN: CPT | Mod: PBBFAC,PN | Performed by: INTERNAL MEDICINE

## 2021-05-21 PROCEDURE — 99999 PR PBB SHADOW E&M-EST. PATIENT-LVL IV: CPT | Mod: PBBFAC,,, | Performed by: INTERNAL MEDICINE

## 2021-05-21 PROCEDURE — 99999 PR PBB SHADOW E&M-EST. PATIENT-LVL IV: ICD-10-PCS | Mod: PBBFAC,,, | Performed by: INTERNAL MEDICINE

## 2021-05-21 RX ORDER — CIPROFLOXACIN 500 MG/1
500 TABLET ORAL 2 TIMES DAILY
Qty: 10 TABLET | Refills: 0 | Status: SHIPPED | OUTPATIENT
Start: 2021-05-21 | End: 2021-05-26

## 2021-05-24 PROBLEM — R19.7 DIARRHEA: Status: RESOLVED | Noted: 2017-03-27 | Resolved: 2021-05-24

## 2021-05-24 PROBLEM — R10.11 RIGHT UPPER QUADRANT PAIN: Status: RESOLVED | Noted: 2021-04-23 | Resolved: 2021-05-24

## 2021-06-13 ENCOUNTER — PATIENT OUTREACH (OUTPATIENT)
Dept: ADMINISTRATIVE | Facility: OTHER | Age: 76
End: 2021-06-13

## 2021-06-14 ENCOUNTER — OFFICE VISIT (OUTPATIENT)
Dept: DERMATOLOGY | Facility: CLINIC | Age: 76
End: 2021-06-14
Payer: MEDICARE

## 2021-06-14 DIAGNOSIS — L57.0 ACTINIC KERATOSES: Primary | ICD-10-CM

## 2021-06-14 DIAGNOSIS — D22.9 MULTIPLE BENIGN NEVI: ICD-10-CM

## 2021-06-14 DIAGNOSIS — L90.5 SCAR: ICD-10-CM

## 2021-06-14 DIAGNOSIS — Z85.828 HISTORY OF NONMELANOMA SKIN CANCER: ICD-10-CM

## 2021-06-14 DIAGNOSIS — L82.1 SEBORRHEIC KERATOSES: ICD-10-CM

## 2021-06-14 DIAGNOSIS — L81.4 SOLAR LENTIGO: ICD-10-CM

## 2021-06-14 PROCEDURE — 99999 PR PBB SHADOW E&M-EST. PATIENT-LVL III: CPT | Mod: PBBFAC,,, | Performed by: DERMATOLOGY

## 2021-06-14 PROCEDURE — 17000 DESTRUCT PREMALG LESION: CPT | Mod: S$PBB,,, | Performed by: DERMATOLOGY

## 2021-06-14 PROCEDURE — 99999 PR PBB SHADOW E&M-EST. PATIENT-LVL III: ICD-10-PCS | Mod: PBBFAC,,, | Performed by: DERMATOLOGY

## 2021-06-14 PROCEDURE — 99213 OFFICE O/P EST LOW 20 MIN: CPT | Mod: 25,S$PBB,, | Performed by: DERMATOLOGY

## 2021-06-14 PROCEDURE — 99213 PR OFFICE/OUTPT VISIT, EST, LEVL III, 20-29 MIN: ICD-10-PCS | Mod: 25,S$PBB,, | Performed by: DERMATOLOGY

## 2021-06-14 PROCEDURE — 17000 DESTRUCT PREMALG LESION: CPT | Mod: PBBFAC,PO | Performed by: DERMATOLOGY

## 2021-06-14 PROCEDURE — 99213 OFFICE O/P EST LOW 20 MIN: CPT | Mod: PBBFAC,PO | Performed by: DERMATOLOGY

## 2021-06-14 PROCEDURE — 17000 PR DESTRUCTION(LASER SURGERY,CRYOSURGERY,CHEMOSURGERY),PREMALIGNANT LESIONS,FIRST LESION: ICD-10-PCS | Mod: S$PBB,,, | Performed by: DERMATOLOGY

## 2021-06-14 PROCEDURE — 17003 DESTRUCT PREMALG LES 2-14: CPT | Mod: S$PBB,,, | Performed by: DERMATOLOGY

## 2021-06-14 PROCEDURE — 17003 DESTRUCT PREMALG LES 2-14: CPT | Mod: PBBFAC,PO | Performed by: DERMATOLOGY

## 2021-06-14 PROCEDURE — 17003 DESTRUCTION, PREMALIGNANT LESIONS; SECOND THROUGH 14 LESIONS: ICD-10-PCS | Mod: S$PBB,,, | Performed by: DERMATOLOGY

## 2021-06-29 RX ORDER — OMEPRAZOLE 20 MG/1
CAPSULE, DELAYED RELEASE ORAL
Qty: 90 CAPSULE | Refills: 3 | Status: SHIPPED | OUTPATIENT
Start: 2021-06-29 | End: 2022-01-21

## 2021-08-23 ENCOUNTER — OFFICE VISIT (OUTPATIENT)
Dept: GASTROENTEROLOGY | Facility: CLINIC | Age: 76
End: 2021-08-23
Payer: MEDICARE

## 2021-08-23 VITALS
HEIGHT: 62 IN | BODY MASS INDEX: 20.33 KG/M2 | RESPIRATION RATE: 16 BRPM | OXYGEN SATURATION: 98 % | SYSTOLIC BLOOD PRESSURE: 139 MMHG | HEART RATE: 66 BPM | WEIGHT: 110.5 LBS | DIASTOLIC BLOOD PRESSURE: 82 MMHG

## 2021-08-23 DIAGNOSIS — K64.9 HEMORRHOIDS, UNSPECIFIED HEMORRHOID TYPE: ICD-10-CM

## 2021-08-23 DIAGNOSIS — K44.9 HIATAL HERNIA: ICD-10-CM

## 2021-08-23 DIAGNOSIS — R11.0 NAUSEA: ICD-10-CM

## 2021-08-23 DIAGNOSIS — K21.9 GASTROESOPHAGEAL REFLUX DISEASE WITHOUT ESOPHAGITIS: Primary | ICD-10-CM

## 2021-08-23 DIAGNOSIS — K57.30 DIVERTICULOSIS OF LARGE INTESTINE WITHOUT HEMORRHAGE: ICD-10-CM

## 2021-08-23 PROCEDURE — 99999 PR PBB SHADOW E&M-EST. PATIENT-LVL IV: CPT | Mod: PBBFAC,,, | Performed by: INTERNAL MEDICINE

## 2021-08-23 PROCEDURE — 99214 PR OFFICE/OUTPT VISIT, EST, LEVL IV, 30-39 MIN: ICD-10-PCS | Mod: S$PBB,,, | Performed by: INTERNAL MEDICINE

## 2021-08-23 PROCEDURE — 99999 PR PBB SHADOW E&M-EST. PATIENT-LVL IV: ICD-10-PCS | Mod: PBBFAC,,, | Performed by: INTERNAL MEDICINE

## 2021-08-23 PROCEDURE — 99214 OFFICE O/P EST MOD 30 MIN: CPT | Mod: S$PBB,,, | Performed by: INTERNAL MEDICINE

## 2021-08-23 PROCEDURE — 99214 OFFICE O/P EST MOD 30 MIN: CPT | Mod: PBBFAC,PN | Performed by: INTERNAL MEDICINE

## 2021-09-14 ENCOUNTER — PATIENT MESSAGE (OUTPATIENT)
Dept: FAMILY MEDICINE | Facility: CLINIC | Age: 76
End: 2021-09-14

## 2021-09-14 DIAGNOSIS — M25.542 ARTHRALGIA OF BOTH HANDS: ICD-10-CM

## 2021-09-14 DIAGNOSIS — M25.541 ARTHRALGIA OF BOTH HANDS: ICD-10-CM

## 2021-09-14 RX ORDER — CELECOXIB 200 MG/1
CAPSULE ORAL
Qty: 180 CAPSULE | Refills: 2 | Status: SHIPPED | OUTPATIENT
Start: 2021-09-14 | End: 2022-02-20

## 2021-09-14 RX ORDER — TRAMADOL HYDROCHLORIDE 50 MG/1
TABLET ORAL
Qty: 90 EACH | Refills: 2 | Status: SHIPPED | OUTPATIENT
Start: 2021-09-14 | End: 2021-11-11

## 2021-11-10 DIAGNOSIS — E78.00 HYPERCHOLESTEROLEMIA: ICD-10-CM

## 2021-11-10 DIAGNOSIS — R73.9 HYPERGLYCEMIA: ICD-10-CM

## 2021-11-10 DIAGNOSIS — I10 ESSENTIAL HYPERTENSION: Primary | ICD-10-CM

## 2021-11-11 RX ORDER — TRAMADOL HYDROCHLORIDE 50 MG/1
TABLET ORAL
Qty: 90 EACH | Refills: 2 | Status: SHIPPED | OUTPATIENT
Start: 2021-11-11 | End: 2022-02-20

## 2021-11-26 ENCOUNTER — OFFICE VISIT (OUTPATIENT)
Dept: FAMILY MEDICINE | Facility: CLINIC | Age: 76
End: 2021-11-26
Payer: MEDICARE

## 2021-11-26 VITALS
RESPIRATION RATE: 16 BRPM | WEIGHT: 108.69 LBS | TEMPERATURE: 99 F | SYSTOLIC BLOOD PRESSURE: 135 MMHG | BODY MASS INDEX: 20 KG/M2 | DIASTOLIC BLOOD PRESSURE: 75 MMHG | HEART RATE: 62 BPM | OXYGEN SATURATION: 96 % | HEIGHT: 62 IN

## 2021-11-26 DIAGNOSIS — Z00.00 ANNUAL PHYSICAL EXAM: Primary | ICD-10-CM

## 2021-11-26 PROCEDURE — 99214 OFFICE O/P EST MOD 30 MIN: CPT | Mod: PBBFAC,PN | Performed by: FAMILY MEDICINE

## 2021-11-26 PROCEDURE — 99999 PR PBB SHADOW E&M-EST. PATIENT-LVL IV: ICD-10-PCS | Mod: PBBFAC,,, | Performed by: FAMILY MEDICINE

## 2021-11-26 PROCEDURE — 99397 PER PM REEVAL EST PAT 65+ YR: CPT | Mod: S$PBB,,, | Performed by: FAMILY MEDICINE

## 2021-11-26 PROCEDURE — 99999 PR PBB SHADOW E&M-EST. PATIENT-LVL IV: CPT | Mod: PBBFAC,,, | Performed by: FAMILY MEDICINE

## 2021-11-26 PROCEDURE — 99397 PR PREVENTIVE VISIT,EST,65 & OVER: ICD-10-PCS | Mod: S$PBB,,, | Performed by: FAMILY MEDICINE

## 2021-11-29 ENCOUNTER — LAB VISIT (OUTPATIENT)
Dept: LAB | Facility: HOSPITAL | Age: 76
End: 2021-11-29
Attending: FAMILY MEDICINE
Payer: MEDICARE

## 2021-11-29 DIAGNOSIS — R73.9 HYPERGLYCEMIA: ICD-10-CM

## 2021-11-29 DIAGNOSIS — I10 ESSENTIAL HYPERTENSION: ICD-10-CM

## 2021-11-29 DIAGNOSIS — E78.00 HYPERCHOLESTEROLEMIA: ICD-10-CM

## 2021-11-29 LAB
ALBUMIN SERPL BCP-MCNC: 4.1 G/DL (ref 3.5–5.2)
ALP SERPL-CCNC: 42 U/L (ref 55–135)
ALT SERPL W/O P-5'-P-CCNC: 10 U/L (ref 10–44)
ANION GAP SERPL CALC-SCNC: 12 MMOL/L (ref 8–16)
AST SERPL-CCNC: 17 U/L (ref 10–40)
BASOPHILS # BLD AUTO: 0.03 K/UL (ref 0–0.2)
BASOPHILS NFR BLD: 0.4 % (ref 0–1.9)
BILIRUB SERPL-MCNC: 0.5 MG/DL (ref 0.1–1)
BUN SERPL-MCNC: 23 MG/DL (ref 8–23)
CALCIUM SERPL-MCNC: 9.4 MG/DL (ref 8.7–10.5)
CHLORIDE SERPL-SCNC: 103 MMOL/L (ref 95–110)
CHOLEST SERPL-MCNC: 243 MG/DL (ref 120–199)
CHOLEST/HDLC SERPL: 3 {RATIO} (ref 2–5)
CO2 SERPL-SCNC: 26 MMOL/L (ref 23–29)
CREAT SERPL-MCNC: 1.2 MG/DL (ref 0.5–1.4)
DIFFERENTIAL METHOD: NORMAL
EOSINOPHIL # BLD AUTO: 0.1 K/UL (ref 0–0.5)
EOSINOPHIL NFR BLD: 1.9 % (ref 0–8)
ERYTHROCYTE [DISTWIDTH] IN BLOOD BY AUTOMATED COUNT: 13.8 % (ref 11.5–14.5)
EST. GFR  (AFRICAN AMERICAN): 50.4 ML/MIN/1.73 M^2
EST. GFR  (NON AFRICAN AMERICAN): 43.7 ML/MIN/1.73 M^2
ESTIMATED AVG GLUCOSE: 114 MG/DL (ref 68–131)
GLUCOSE SERPL-MCNC: 89 MG/DL (ref 70–110)
HBA1C MFR BLD: 5.6 % (ref 4–5.6)
HCT VFR BLD AUTO: 41.9 % (ref 37–48.5)
HDLC SERPL-MCNC: 80 MG/DL (ref 40–75)
HDLC SERPL: 32.9 % (ref 20–50)
HGB BLD-MCNC: 14 G/DL (ref 12–16)
IMM GRANULOCYTES # BLD AUTO: 0.02 K/UL (ref 0–0.04)
IMM GRANULOCYTES NFR BLD AUTO: 0.3 % (ref 0–0.5)
LDLC SERPL CALC-MCNC: 140.8 MG/DL (ref 63–159)
LYMPHOCYTES # BLD AUTO: 1.8 K/UL (ref 1–4.8)
LYMPHOCYTES NFR BLD: 25.7 % (ref 18–48)
MCH RBC QN AUTO: 30.8 PG (ref 27–31)
MCHC RBC AUTO-ENTMCNC: 33.4 G/DL (ref 32–36)
MCV RBC AUTO: 92 FL (ref 82–98)
MONOCYTES # BLD AUTO: 0.6 K/UL (ref 0.3–1)
MONOCYTES NFR BLD: 9.1 % (ref 4–15)
NEUTROPHILS # BLD AUTO: 4.3 K/UL (ref 1.8–7.7)
NEUTROPHILS NFR BLD: 62.6 % (ref 38–73)
NONHDLC SERPL-MCNC: 163 MG/DL
NRBC BLD-RTO: 0 /100 WBC
PLATELET # BLD AUTO: 383 K/UL (ref 150–450)
PMV BLD AUTO: 10.6 FL (ref 9.2–12.9)
POTASSIUM SERPL-SCNC: 4.6 MMOL/L (ref 3.5–5.1)
PROT SERPL-MCNC: 7.3 G/DL (ref 6–8.4)
RBC # BLD AUTO: 4.55 M/UL (ref 4–5.4)
SODIUM SERPL-SCNC: 141 MMOL/L (ref 136–145)
T4 FREE SERPL-MCNC: 1.27 NG/DL (ref 0.71–1.51)
TRIGL SERPL-MCNC: 111 MG/DL (ref 30–150)
TSH SERPL DL<=0.005 MIU/L-ACNC: 2.75 UIU/ML (ref 0.4–4)
WBC # BLD AUTO: 6.89 K/UL (ref 3.9–12.7)

## 2021-11-29 PROCEDURE — 80061 LIPID PANEL: CPT | Performed by: FAMILY MEDICINE

## 2021-11-29 PROCEDURE — 85025 COMPLETE CBC W/AUTO DIFF WBC: CPT | Performed by: FAMILY MEDICINE

## 2021-11-29 PROCEDURE — 84443 ASSAY THYROID STIM HORMONE: CPT | Performed by: FAMILY MEDICINE

## 2021-11-29 PROCEDURE — 80053 COMPREHEN METABOLIC PANEL: CPT | Performed by: FAMILY MEDICINE

## 2021-11-29 PROCEDURE — 36415 COLL VENOUS BLD VENIPUNCTURE: CPT | Performed by: FAMILY MEDICINE

## 2021-11-29 PROCEDURE — 82570 ASSAY OF URINE CREATININE: CPT | Performed by: FAMILY MEDICINE

## 2021-11-29 PROCEDURE — 83036 HEMOGLOBIN GLYCOSYLATED A1C: CPT | Performed by: FAMILY MEDICINE

## 2021-11-29 PROCEDURE — 84439 ASSAY OF FREE THYROXINE: CPT | Performed by: FAMILY MEDICINE

## 2021-11-30 LAB
ALBUMIN/CREAT UR: 3.8 UG/MG (ref 0–30)
CREAT UR-MCNC: 208 MG/DL (ref 15–325)
MICROALBUMIN UR DL<=1MG/L-MCNC: 8 UG/ML

## 2021-12-10 ENCOUNTER — PATIENT MESSAGE (OUTPATIENT)
Dept: FAMILY MEDICINE | Facility: CLINIC | Age: 76
End: 2021-12-10
Payer: MEDICARE

## 2021-12-13 ENCOUNTER — HOSPITAL ENCOUNTER (OUTPATIENT)
Dept: RADIOLOGY | Facility: CLINIC | Age: 76
Discharge: HOME | End: 2021-12-13
Attending: INTERNAL MEDICINE
Payer: MEDICARE

## 2021-12-13 DIAGNOSIS — M85.852 OSTEOPENIA OF NECK OF LEFT FEMUR: ICD-10-CM

## 2021-12-13 PROCEDURE — 77080 DXA BONE DENSITY AXIAL: CPT | Mod: 26,,, | Performed by: RADIOLOGY

## 2021-12-13 PROCEDURE — 77080 DEXA BONE DENSITY SPINE HIP: ICD-10-PCS | Mod: 26,,, | Performed by: RADIOLOGY

## 2021-12-13 PROCEDURE — 77080 DXA BONE DENSITY AXIAL: CPT | Mod: TC,PO

## 2022-01-21 RX ORDER — OMEPRAZOLE 20 MG/1
CAPSULE, DELAYED RELEASE ORAL
Qty: 90 CAPSULE | Refills: 3 | Status: SHIPPED | OUTPATIENT
Start: 2022-01-21 | End: 2023-09-14

## 2022-02-10 ENCOUNTER — OFFICE VISIT (OUTPATIENT)
Dept: ENDOCRINOLOGY | Facility: CLINIC | Age: 77
End: 2022-02-10
Payer: MEDICARE

## 2022-02-10 VITALS
WEIGHT: 112.44 LBS | TEMPERATURE: 98 F | HEART RATE: 57 BPM | DIASTOLIC BLOOD PRESSURE: 70 MMHG | BODY MASS INDEX: 20.69 KG/M2 | HEIGHT: 62 IN | SYSTOLIC BLOOD PRESSURE: 130 MMHG | OXYGEN SATURATION: 98 %

## 2022-02-10 DIAGNOSIS — E89.0 POSTABLATIVE HYPOTHYROIDISM: ICD-10-CM

## 2022-02-10 DIAGNOSIS — M81.0 AGE-RELATED OSTEOPOROSIS WITHOUT CURRENT PATHOLOGICAL FRACTURE: Primary | ICD-10-CM

## 2022-02-10 DIAGNOSIS — E21.3 HYPERPARATHYROIDISM: ICD-10-CM

## 2022-02-10 PROBLEM — M85.852 OSTEOPENIA OF NECK OF LEFT FEMUR: Status: RESOLVED | Noted: 2020-03-19 | Resolved: 2022-02-10

## 2022-02-10 PROBLEM — R11.0 NAUSEA: Status: RESOLVED | Noted: 2021-04-23 | Resolved: 2022-02-10

## 2022-02-10 PROBLEM — E83.52 HYPERCALCEMIA: Status: RESOLVED | Noted: 2019-06-19 | Resolved: 2022-02-10

## 2022-02-10 PROBLEM — K57.92 DIVERTICULITIS: Status: RESOLVED | Noted: 2021-04-23 | Resolved: 2022-02-10

## 2022-02-10 PROBLEM — R42 DIZZINESS: Status: RESOLVED | Noted: 2017-01-04 | Resolved: 2022-02-10

## 2022-02-10 PROBLEM — E05.90 HYPERTHYROIDISM: Status: RESOLVED | Noted: 2020-03-19 | Resolved: 2022-02-10

## 2022-02-10 PROCEDURE — 99999 PR PBB SHADOW E&M-EST. PATIENT-LVL IV: ICD-10-PCS | Mod: PBBFAC,,, | Performed by: INTERNAL MEDICINE

## 2022-02-10 PROCEDURE — 99214 OFFICE O/P EST MOD 30 MIN: CPT | Mod: PBBFAC,PO | Performed by: INTERNAL MEDICINE

## 2022-02-10 PROCEDURE — 99999 PR PBB SHADOW E&M-EST. PATIENT-LVL IV: CPT | Mod: PBBFAC,,, | Performed by: INTERNAL MEDICINE

## 2022-02-10 PROCEDURE — 99214 OFFICE O/P EST MOD 30 MIN: CPT | Mod: S$PBB,,, | Performed by: INTERNAL MEDICINE

## 2022-02-10 PROCEDURE — 99214 PR OFFICE/OUTPT VISIT, EST, LEVL IV, 30-39 MIN: ICD-10-PCS | Mod: S$PBB,,, | Performed by: INTERNAL MEDICINE

## 2022-02-10 NOTE — ASSESSMENT & PLAN NOTE
Primary hyperparathyroidism.   - with CKD, osteopenia   - s/p 2 gland parathyroidectomy 10/2020 in [place]   - last PTH, calcium normal   - still on calcium BID, having problems with large pill size.   discussed recommended intake 1200 mg/day calcium. Some in diet and some in vitamin is okay. Try to decrease to 1/day.   recheck level in 1-2 months

## 2022-02-10 NOTE — ASSESSMENT & PLAN NOTE
Seen on last DXA.   Osteopenia on BMD, but FRAX 3.3% hip.    discussed indications for treatment, hip fracture risk >3%   - no falls/fractures.   - encourage pt continue vit D (1,000 units/day) and calcium (1200 mg per day)   reviewed options. Fosamax (has reflux), reclast (has CKD, GFR often down to 30 rather than >35), prolia.     Not interested in daily/monthly injections     On hormones still so avoid evista.   so, pretty much reclast vs prolia, but options limited by renal function   pt prefers prolia. Reviewed risks of losing benefit if stopping   reviewed low risk for ONJ, pt denies dental issues/plans for procedures coming up.   orders in.

## 2022-02-10 NOTE — PATIENT INSTRUCTIONS
For the bones:    Recommend prolia or reclast.     Patient Education     Denosumab (prolia) (derrek fish)   Brand Names: [place] Prolia; Xgeva   Brand Names: Duran Prolia; Xgeva   What is this drug used for?   · It is used to treat soft, brittle bones (osteoporosis).  · It is used for bone growth.  · It is used when treating some cancers.  · It is used to treat high calcium levels in patients with cancer.  · It may be given to you for other reasons. Talk with the doctor.    What do I need to tell my doctor BEFORE I take this drug?   · If you are allergic to this drug; any part of this drug; or any other drugs, foods, or substances. Tell your doctor about the allergy and what signs you had.  · If you have low calcium levels.  · If you are using another drug that has the same drug in it.  · If you are pregnant or may be pregnant. Do not take this drug if you are pregnant.  · If you are breast-feeding or plan to breast-feed.  This is not a list of all drugs or health problems that interact with this drug.  Tell your doctor and pharmacist about all of your drugs (prescription or OTC, natural products, vitamins) and health problems. You must check to make sure that it is safe for you to take this drug with all of your drugs and health problems. Do not start, stop, or change the dose of any drug without checking with your doctor.  What are some things I need to know or do while I take this drug?   All products:   · Tell all of your health care providers that you take this drug. This includes your doctors, nurses, pharmacists, and dentists.  · This drug may raise the chance of a broken leg. Talk with the doctor.  · If treatment with this drug is stopped, skipped, or delayed, the chance of a broken bone is raised. This includes bones in the spine. The chance of having more than 1 broken bone in the spine is raised if you have ever had a broken bone in your spine. Do not stop, skip, or delay treatment with this drug without  talking to your doctor.  · Have a bone density test as you have been told by your doctor. Talk with your doctor.  · Have blood work checked as you have been told by the doctor. Talk with the doctor.  · Take calcium and vitamin D as you were told by your doctor.  · Have a dental exam before starting this drug.  · Take good care of your teeth. See a dentist often.  · This drug may cause harm to an unborn baby. A pregnancy test will be done before you start this drug to show that you are NOT pregnant.  · If you may become pregnant, you must use birth control while taking this drug and for some time after the last dose. Ask your doctor how long to use birth control. If you get pregnant, call your doctor right away.  Xgeva®:   · Very low blood calcium levels have happened with this drug. Sometimes, this has been deadly. If you have questions, talk with the doctor.  · High calcium levels have happened after this drug was stopped in people whose bones were still growing and people with giant cell bone tumor. Call your doctor right away if you have signs of high calcium levels like weakness, confusion, feeling tired, headache, upset stomach or throwing up, constipation, or bone pain.  Prolia:   · Very bad infections have been reported with use of this drug. If you have any infection, are taking antibiotics now or in the recent past, or have many infections, talk with your doctor.  · Rarely, a pancreas problem (pancreatitis) has happened with this drug. This has included 1 death. Call your doctor right away if you have signs of pancreatitis like very bad stomach pain, very bad back pain, or very bad upset stomach or throwing up.  · This drug may lower blood calcium levels. If you already have low blood calcium, it may get worse with this drug. Sometimes, blood calcium levels have stayed low for weeks or months after use of this drug. Talk with the doctor.  · This drug is not approved for use in children. Talk with the  doctor.  What are some side effects that I need to call my doctor about right away?   WARNING/CAUTION: Even though it may be rare, some people may have very bad and sometimes deadly side effects when taking a drug. Tell your doctor or get medical help right away if you have any of the following signs or symptoms that may be related to a very bad side effect:  All products:   · Signs of an allergic reaction, like rash; hives; itching; red, swollen, blistered, or peeling skin with or without fever; wheezing; tightness in the chest or throat; trouble breathing, swallowing, or talking; unusual hoarseness; or swelling of the mouth, face, lips, tongue, or throat.  · Signs of low calcium levels like muscle cramps or spasms, numbness and tingling, or seizures.  · Mouth sores.  · Swelling in the arms or legs.  · Feeling very tired or weak.  · Any new or strange groin, hip, or thigh pain.  · Very bad bone, joint, or muscle pain.  · Shortness of breath.  · This drug may cause jawbone problems. The risk may be higher with longer use, cancer, dental problems, ill-fitting dentures, anemia, blood clotting problems, or infection. It may also be higher if you have dental work, chemo, radiation, or take other drugs that may cause jawbone problems. Many drugs can do this. Talk with your doctor if any of these apply to you, or if you have questions. Call your doctor right away if you have jaw swelling or pain.  Xgeva®:   · Signs of low phosphate levels like change in eyesight, feeling confused, mood changes, muscle pain or weakness, shortness of breath or other breathing problems, or trouble swallowing.  · Very bad dizziness or passing out.  · Any unexplained bruising or bleeding.  Prolia:   · Signs of infection like fever, chills, very bad sore throat, ear or sinus pain, cough, more sputum or change in color of sputum, pain with passing urine, mouth sores, or wound that will not heal.  · Signs of skin infection like oozing, heat,  swelling, redness, or pain.  · Signs of high or low blood pressure like very bad headache or dizziness, passing out, or change in eyesight.  · Small bumps or patches on your skin, dry skin, or if your skin feels like leather.  · Bladder pain or pain when passing urine or change in how much urine is passed.  · Passing urine more often.  What are some other side effects of this drug?   All drugs may cause side effects. However, many people have no side effects or only have minor side effects. Call your doctor or get medical help if any of these side effects or any other side effects bother you or do not go away:  All products:   · Back pain.  · Headache.  · Signs of a common cold.  · Pain in arms or legs.  · Muscle or joint pain.  Xgeva®:   · Constipation, diarrhea, stomach pain, upset stomach, throwing up, or feeling less hungry.  · Feeling tired or weak.  · Nose or throat irritation.  · Tooth pain.  These are not all of the side effects that may occur. If you have questions about side effects, call your doctor. Call your doctor for medical advice about side effects.  You may report side effects to your national health agency.  You may report side effects to the FDA at 1-549.839.9279. You may also report side effects at https://www.fda.gov/medwatch.  How is this drug best taken?   Use this drug as ordered by your doctor. Read all information given to you. Follow all instructions closely.  · It is given as a shot into the fatty part of the skin.  What do I do if I miss a dose?   · Call your doctor to find out what to do.    How do I store and/or throw out this drug?   · If you need to store this drug at home, talk with your doctor, nurse, or pharmacist about how to store it.    General drug facts   · If your symptoms or health problems do not get better or if they become worse, call your doctor.  · Do not share your drugs with others and do not take anyone else's drugs.  · Keep all drugs in a safe place. Keep all  drugs out of the reach of children and pets.  · Throw away unused or  drugs. Do not flush down a toilet or pour down a drain unless you are told to do so. Check with your pharmacist if you have questions about the best way to throw out drugs. There may be drug take-back programs in your area.  · Some drugs may have another patient information leaflet. If you have any questions about this drug, please talk with your doctor, nurse, pharmacist, or other health care provider.  · Some drugs may have another patient information leaflet. Check with your pharmacist. If you have any questions about this drug, please talk with your doctor, nurse, pharmacist, or other health care provider.  · If you think there has been an overdose, call your poison control center or get medical care right away. Be ready to tell or show what was taken, how much, and when it happened.    Consumer Information Use and Disclaimer   This generalized information is a limited summary of diagnosis, treatment, and/or medication information. It is not meant to be comprehensive and should be used as a tool to help the user understand and/or assess potential diagnostic and treatment options. It does NOT include all information about conditions, treatments, medications, side effects, or risks that may apply to a specific patient. It is not intended to be medical advice or a substitute for the medical advice, diagnosis, or treatment of a health care provider based on the health care provider's examination and assessment of a patient's specific and unique circumstances. Patients must speak with a health care provider for complete information about their health, medical questions, and treatment options, including any risks or benefits regarding use of medications. This information does not endorse any treatments or medications as safe, effective, or approved for treating a specific patient. [employer] and its affiliates disclaim any warranty or  liability relating to this information or the use thereof. The use of this information is governed by the Terms of Use, available at https://www.woltersHeadspaceuwer.com/en/solutions/lexicomp/about/lenore.  Last Reviewed Date   2020-08-03  Copyright   © 2021 [employer] and its affiliates and/or licensors. All rights reserved.

## 2022-02-10 NOTE — PROGRESS NOTES
Subjective:      Chief Complaint: Osteoporosis    HPI: Suki Macias is a 77 y.o. female who is here for a follow-up evaluation for hyperparathyroidism. Last seen 12/10/2020    Disease history:  Reports GI issues about 1.5 years ago. On steroids, didn't get better. Saw somebody else who found high calcium, prompting additional workup. Saw endocrine (Dr. Schmitz) to eval calcium, parathyroid.     Had elevated PTH for a while. Since at least 2018.   Calcium mildly elevated off and on, up to 11.6 at the highest, though last Ca normal.     Prior labs:        Lab Results   Component Value Date     CALCIUM 11.2 (H) 07/14/2020     CALCIUM 11.2 (H) 07/14/2020     ALBUMIN 3.8 07/14/2020     ALBUMIN 3.8 07/14/2020     CREATININE 1.1 07/14/2020     CREATININE 1.1 07/14/2020     CREATININE 1.1 07/14/2020     [id number] 31 03/13/2020     .0 (H) 07/14/2020    GFR 49    Prior labs:            Lab Results   Component Value Date     CALCIUM 10.0 03/13/2020     ALBUMIN 4.0 04/22/2019     CREATININE 1.0 03/13/2020     [id number] 31 03/13/2020     .0 (H) 03/13/2020   Had 24 hr urine Ca of 140 (volume only 600, no creatinine at that time).     Repeat 24 hr urine 7/2020: Volume 500. Creat 800   calcium 192. Low, but with low volume need to compare with creatinine.  Serum Ca 11.2, alb 3.8, Cr 1.1   Ratio: 0.023   - Not suggestive of [facility]     Neck US 7/8/2020:   Potential parathyroid adenoma left upper pole. 1.2x0.8x0.6 cm in size, hypoechoic.    Then had parathyroid surgery October 2020. With [facility] in [place], [place]. No records here, but pt remembers they removed 2 parathyroid glands. Benign.    Last labs:  Lab Results   Component Value Date    CALCIUM 9.4 11/29/2021    ALBUMIN 4.1 11/29/2021    CREATININE 1.2 11/29/2021    [id number] 53 12/04/2020    PTH 29.0 12/04/2020       Bones:  DXA 12/2021: osteopenia. FRAX 12% major, 3.3% hip   spine 2.6. BMD 1.022   left fem neck -1.9, BMD 0.639     DXA  11/2019: normal   Forearm tscore 0.8  DXA 10/2019: osteopenia. FRAX 11% major, 2.7% hip.   spine tscore +2.0   Left hip -1.8     Not smoking.  No FH hip fractures. +FH osteoporosis (patient's mother)    Vitamins:    calcium intake: On supplement OTC BID. 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 She has had some issues with getting dose regulated.     Lab Results   Component Value Date    TSH 2.754 11/29/2021    M9UXKEV 64 12/04/2020    H1SIISQ 16.3 (H) 06/19/2017    FREET4 1.27 11/29/2021     Thyroid medication: On 88 mcg/day Synthroid (brand medication).    Today, pt reports feeling well.     No falls/fractures.   occasional palpitations when waking up. But none while awake  Weight fairly stable  Some sinus problems.  Has reflux, on omeprazole    Reviewed past medical, family, social history and updated as appropriate.    Review of Systems   As above.    Objective:     Vitals:    02/10/22 0812   BP: 130/70   Pulse: (!) 57   Temp: 97.9 °F (36.6 °C)     BP Readings from Last 5 Encounters:   02/10/22 130/70   11/26/21 135/75   08/23/21 139/82   05/21/21 134/75   05/07/21 137/76     Physical Exam  Vitals reviewed.   Constitutional:       General: She is not in acute distress.  Neck:      Thyroid: No thyromegaly.      Comments: +scar  Cardiovascular:      Heart sounds: Normal heart sounds.   Pulmonary:      Effort: Pulmonary effort is normal.         Wt Readings from Last 10 Encounters:   02/10/22 0812 51 kg (112 lb 7 oz)   11/26/21 1020 49.3 kg (108 lb 11.2 oz)   08/23/21 1043 50.1 kg (110 lb 8 oz)   05/21/21 1111 49 kg (108 lb)   05/07/21 1143 49.5 kg (109 lb 3.2 oz)   04/23/21 1050 51.3 kg (113 lb)   03/22/21 1145 50.5 kg (111 lb 4.8 oz)   03/09/21 1117 50.2 kg (110 lb 9.6 oz)   12/10/20 0957 51.3 kg (113 lb 1.5 oz)   08/03/20 1029 49 kg (108 lb)       Lab Results   Component Value Date    HGBA1C 5.6 11/29/2021     Lab Results    Component Value Date    CHOL 243 (H) 11/29/2021    HDL 80 (H) 11/29/2021    LDLCALC 140.8 11/29/2021    TRIG 111 11/29/2021    CHOLHDL 32.9 11/29/2021     Lab Results   Component Value Date     11/29/2021    K 4.6 11/29/2021     11/29/2021    CO2 26 11/29/2021    GLU 89 11/29/2021    BUN 23 11/29/2021    CREATININE 1.2 11/29/2021    CALCIUM 9.4 11/29/2021    PROT 7.3 11/29/2021    ALBUMIN 4.1 11/29/2021    BILITOT 0.5 11/29/2021    ALKPHOS 42 (L) 11/29/2021    AST 17 11/29/2021    ALT 10 11/29/2021    ANIONGAP 12 11/29/2021    ESTGFRAFRICA 50.4 (A) 11/29/2021    EGFRNONAA 43.7 (A) 11/29/2021    TSH 2.754 11/29/2021        Assessment/Plan:     Hyperparathyroidism  Primary hyperparathyroidism.   - with CKD, osteopenia   - s/p 2 gland parathyroidectomy 10/2020 in [place]   - last PTH, calcium normal   - still on calcium BID, having problems with large pill size.   discussed recommended intake 1200 mg/day calcium. Some in diet and some in vitamin is okay. Try to decrease to 1/day.   recheck level in 1-2 months      Age-related osteoporosis without current pathological fracture  Seen on last DXA.   Osteopenia on BMD, but FRAX 3.3% hip.    discussed indications for treatment, hip fracture risk >3%   - no falls/fractures.   - encourage pt continue vit D (1,000 units/day) and calcium (1200 mg per day)   reviewed options. Fosamax (has reflux), reclast (has CKD, GFR often down to 30 rather than >35), prolia.     Not interested in daily/monthly injections     On hormones still so avoid evista.   so, pretty much reclast vs prolia, but options limited by renal function   pt prefers prolia. Reviewed risks of losing benefit if stopping   reviewed low risk for ONJ, pt denies dental issues/plans for procedures coming up.   orders in.        Hypothyroid  Hx Graves disease, s/p RAIA decades ago, now hypothyroid   - on synthroid 88, clinically euthyroid   - last labs normal TSH   - continue same dose   - recheck 1-2 times  a year. Or sooner if symptoms   f/u with PCP        30 minutes of total time spent on the encounter, which includes face to face time and non-face to face time preparing to see the patient (eg, review of tests), Obtaining and/or reviewing separately obtained history, Documenting clinical information in the electronic or other health record, Independently interpreting results (not separately reported) and communicating results to the patient/family/caregiver, or Care coordination (not separately reported).      Labs in 1-2 months, before prolia    Follow up in about 1 year (around 2/10/2023) for lab review, further monitoring.      Aman Sood MD  Endocrinology

## 2022-02-10 NOTE — ASSESSMENT & PLAN NOTE
Hx Graves disease, s/p RAIA decades ago, now hypothyroid   - on synthroid 88, clinically euthyroid   - last labs normal TSH   - continue same dose   - recheck 1-2 times a year. Or sooner if symptoms   f/u with PCP

## 2022-02-17 ENCOUNTER — PATIENT MESSAGE (OUTPATIENT)
Dept: ENDOCRINOLOGY | Facility: CLINIC | Age: 77
End: 2022-02-17
Payer: MEDICARE

## 2022-02-18 ENCOUNTER — TELEPHONE (OUTPATIENT)
Dept: ENDOCRINOLOGY | Facility: CLINIC | Age: 77
End: 2022-02-18
Payer: MEDICARE

## 2022-02-18 ENCOUNTER — PATIENT MESSAGE (OUTPATIENT)
Dept: ENDOCRINOLOGY | Facility: CLINIC | Age: 77
End: 2022-02-18
Payer: MEDICARE

## 2022-02-18 NOTE — TELEPHONE ENCOUNTER
Attempted to call patient incase she does not check her portal message before Monday. No ans, LVM: was able to schedule you an appt at the [facility] as you requested for Monday, 2/21/22. The Appt is for 9:30am.  I left you patient portal message as well.

## 2022-02-21 ENCOUNTER — LAB VISIT (OUTPATIENT)
Dept: LAB | Facility: HOSPITAL | Age: 77
End: 2022-02-21
Attending: INTERNAL MEDICINE
Payer: MEDICARE

## 2022-02-21 DIAGNOSIS — M81.0 AGE-RELATED OSTEOPOROSIS WITHOUT CURRENT PATHOLOGICAL FRACTURE: ICD-10-CM

## 2022-02-21 DIAGNOSIS — E21.3 HYPERPARATHYROIDISM: ICD-10-CM

## 2022-02-21 LAB
ALBUMIN SERPL BCP-MCNC: 4 G/DL (ref 3.5–5.2)
ALP SERPL-CCNC: 43 U/L (ref 55–135)
ALT SERPL W/O P-5'-P-CCNC: 12 U/L (ref 10–44)
ANION GAP SERPL CALC-SCNC: 12 MMOL/L (ref 8–16)
AST SERPL-CCNC: 18 U/L (ref 10–40)
BILIRUB SERPL-MCNC: 0.5 MG/DL (ref 0.1–1)
BUN SERPL-MCNC: 22 MG/DL (ref 8–23)
CALCIUM SERPL-MCNC: 9.4 MG/DL (ref 8.7–10.5)
CHLORIDE SERPL-SCNC: 101 MMOL/L (ref 95–110)
CO2 SERPL-SCNC: 26 MMOL/L (ref 23–29)
CREAT SERPL-MCNC: 1.2 MG/DL (ref 0.5–1.4)
EST. GFR  (AFRICAN AMERICAN): 50 ML/MIN/1.73 M^2
EST. GFR  (NON AFRICAN AMERICAN): 43.4 ML/MIN/1.73 M^2
GLUCOSE SERPL-MCNC: 96 MG/DL (ref 70–110)
POTASSIUM SERPL-SCNC: 4.4 MMOL/L (ref 3.5–5.1)
PROT SERPL-MCNC: 6.6 G/DL (ref 6–8.4)
SODIUM SERPL-SCNC: 139 MMOL/L (ref 136–145)

## 2022-02-21 PROCEDURE — 80053 COMPREHEN METABOLIC PANEL: CPT | Performed by: INTERNAL MEDICINE

## 2022-02-21 PROCEDURE — 36415 COLL VENOUS BLD VENIPUNCTURE: CPT | Performed by: INTERNAL MEDICINE

## 2022-02-21 PROCEDURE — 83970 ASSAY OF PARATHORMONE: CPT | Performed by: INTERNAL MEDICINE

## 2022-02-22 LAB — PTH-INTACT SERPL-MCNC: 23.5 PG/ML (ref 9–77)

## 2022-02-28 ENCOUNTER — INFUSION (OUTPATIENT)
Dept: INFUSION THERAPY | Facility: HOSPITAL | Age: 77
End: 2022-02-28
Attending: INTERNAL MEDICINE
Payer: MEDICARE

## 2022-02-28 VITALS
RESPIRATION RATE: 18 BRPM | SYSTOLIC BLOOD PRESSURE: 174 MMHG | HEART RATE: 50 BPM | WEIGHT: 111.19 LBS | OXYGEN SATURATION: 97 % | DIASTOLIC BLOOD PRESSURE: 89 MMHG | TEMPERATURE: 98 F | BODY MASS INDEX: 20.34 KG/M2

## 2022-02-28 DIAGNOSIS — M81.0 AGE-RELATED OSTEOPOROSIS WITHOUT CURRENT PATHOLOGICAL FRACTURE: Primary | ICD-10-CM

## 2022-02-28 PROCEDURE — 96372 THER/PROPH/DIAG INJ SC/IM: CPT

## 2022-02-28 PROCEDURE — 63600175 PHARM REV CODE 636 W HCPCS: Mod: JG | Performed by: INTERNAL MEDICINE

## 2022-02-28 RX ADMIN — DENOSUMAB 60 MG: 60 INJECTION SUBCUTANEOUS at 04:02

## 2022-02-28 NOTE — PLAN OF CARE
Patient given prolia per MD orders. Tolerated well. Calcium 9.4. denies dental work, taking supplements.

## 2022-03-18 DIAGNOSIS — E78.2 HYPERCHOLESTEROLEMIA WITH HYPERTRIGLYCERIDEMIA: ICD-10-CM

## 2022-03-21 RX ORDER — FENOFIBRIC ACID 135 MG/1
CAPSULE, DELAYED RELEASE ORAL
Qty: 90 CAPSULE | Refills: 3 | Status: SHIPPED | OUTPATIENT
Start: 2022-03-21 | End: 2023-06-21 | Stop reason: SDUPTHER

## 2022-05-06 ENCOUNTER — OFFICE VISIT (OUTPATIENT)
Dept: DERMATOLOGY | Facility: CLINIC | Age: 77
End: 2022-05-06
Payer: MEDICARE

## 2022-05-06 VITALS — HEIGHT: 62 IN | WEIGHT: 106 LBS | BODY MASS INDEX: 19.51 KG/M2

## 2022-05-06 DIAGNOSIS — L82.1 SEBORRHEIC KERATOSES: ICD-10-CM

## 2022-05-06 DIAGNOSIS — D22.9 MULTIPLE BENIGN NEVI: ICD-10-CM

## 2022-05-06 DIAGNOSIS — L81.4 SOLAR LENTIGO: ICD-10-CM

## 2022-05-06 DIAGNOSIS — L90.5 SCAR: ICD-10-CM

## 2022-05-06 DIAGNOSIS — Z85.828 HISTORY OF NONMELANOMA SKIN CANCER: ICD-10-CM

## 2022-05-06 DIAGNOSIS — L57.0 ACTINIC KERATOSES: Primary | ICD-10-CM

## 2022-05-06 PROCEDURE — 99213 OFFICE O/P EST LOW 20 MIN: CPT | Mod: PBBFAC,PO | Performed by: DERMATOLOGY

## 2022-05-06 PROCEDURE — 17000 DESTRUCT PREMALG LESION: CPT | Mod: S$PBB,,, | Performed by: DERMATOLOGY

## 2022-05-06 PROCEDURE — 17003 DESTRUCTION, PREMALIGNANT LESIONS; SECOND THROUGH 14 LESIONS: ICD-10-PCS | Mod: S$PBB,,, | Performed by: DERMATOLOGY

## 2022-05-06 PROCEDURE — 99999 PR PBB SHADOW E&M-EST. PATIENT-LVL III: ICD-10-PCS | Mod: PBBFAC,,, | Performed by: DERMATOLOGY

## 2022-05-06 PROCEDURE — 17003 DESTRUCT PREMALG LES 2-14: CPT | Mod: PBBFAC,PO | Performed by: DERMATOLOGY

## 2022-05-06 PROCEDURE — 17000 PR DESTRUCTION(LASER SURGERY,CRYOSURGERY,CHEMOSURGERY),PREMALIGNANT LESIONS,FIRST LESION: ICD-10-PCS | Mod: S$PBB,,, | Performed by: DERMATOLOGY

## 2022-05-06 PROCEDURE — 17003 DESTRUCT PREMALG LES 2-14: CPT | Mod: S$PBB,,, | Performed by: DERMATOLOGY

## 2022-05-06 PROCEDURE — 17000 DESTRUCT PREMALG LESION: CPT | Mod: PBBFAC,PO | Performed by: DERMATOLOGY

## 2022-05-06 PROCEDURE — 99999 PR PBB SHADOW E&M-EST. PATIENT-LVL III: CPT | Mod: PBBFAC,,, | Performed by: DERMATOLOGY

## 2022-05-06 PROCEDURE — 99213 OFFICE O/P EST LOW 20 MIN: CPT | Mod: 25,S$PBB,, | Performed by: DERMATOLOGY

## 2022-05-06 PROCEDURE — 99213 PR OFFICE/OUTPT VISIT, EST, LEVL III, 20-29 MIN: ICD-10-PCS | Mod: 25,S$PBB,, | Performed by: DERMATOLOGY

## 2022-05-06 NOTE — PROGRESS NOTES
Subjective:       Patient ID:  Suki Macias is a 77 y.o. 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 , Disp: , Rfl:     fenofibric acid (FIBRICOR) 135 mg CpDR, TAKE 1 CAPSULE ONCE DAILY WITH EVENING MEAL FOR CHOLESTEROL, Disp: 90 capsule, Rfl: 3    lisinopriL (PRINIVIL,ZESTRIL) 2.5 MG tablet, TAKE 1 TABLET BY MOUTH ONCE DAILY, Disp: 90 tablet, Rfl: 3    omeprazole (PRILOSEC) 20 MG capsule, TAKE 1 CAPSULE ONCE DAILY FOR STOMACH, Disp: 90 capsule, Rfl: 3    ondansetron (ZOFRAN-ODT) 4 MG TbDL, Take 1 tablet (4 mg total) by mouth every 6 (six) hours as needed (for nausea)., Disp: 60 tablet, Rfl: 0    PREMARIN 0.9 mg Tab, TAKE 1 TABLET (0.9 MG TOTAL) BY MOUTH ONCE DAILY., Disp: 90 tablet, Rfl: 3    SYNTHROID 88 mcg tablet, TAKE 1 TABLET BY MOUTH BEFORE BREAKFAST, Disp: 90 tablet, Rfl: 3    traMADoL (ULTRAM) 50 mg tablet, T,PO,Q8H,PP, Disp: 90 tablet, Rfl: 2        Review of Systems   Constitutional: Negative for fever, chills and fatigue.   Skin: Positive for daily sunscreen use, activity-related sunscreen use and wears hat.   Hematologic/Lymphatic: Bruises/bleeds easily.        Objective:     Physical Exam   Constitutional: She appears well-developed and well-nourished. No distress.   Neurological: She is alert and oriented to person, place, and time. She is not disoriented.   Psychiatric: She has a normal mood and affect.   Skin:   Areas Examined (abnormalities noted in diagram):   Scalp / Hair Palpated and Inspected  Head / Face Inspection Performed  Neck Inspection Performed  Chest / Axilla Inspection Performed  Abdomen Inspection Performed  Genitals / Buttocks / Groin Inspection Performed  Back Inspection Performed  RUE Inspected  LUE Inspection Performed  RLE Inspected  LLE Inspection Performed  Nails and Digits Inspection Performed                       Diagram Legend     Erythematous scaling macule/papule c/w actinic keratosis       Vascular papule c/w angioma      Pigmented verrucoid papule/plaque c/w seborrheic keratosis      Yellow umbilicated papule c/w sebaceous hyperplasia      Irregularly shaped tan macule c/w lentigo     1-2 mm smooth white papules consistent with Milia      Movable subcutaneous cyst with punctum c/w epidermal inclusion cyst      Subcutaneous movable cyst c/w pilar cyst      Firm pink to brown papule c/w dermatofibroma      Pedunculated fleshy papule(s) c/w skin tag(s)      Evenly pigmented macule c/w junctional nevus     Mildly variegated pigmented, slightly irregular-bordered macule c/w mildly atypical nevus      Flesh colored to evenly pigmented papule c/w intradermal nevus       Pink pearly papule/plaque c/w basal cell carcinoma      Erythematous hyperkeratotic cursted plaque c/w SCC      Surgical scar with no sign of skin cancer recurrence      Open and closed comedones      Inflammatory papules and pustules      Verrucoid papule consistent consistent with wart     Erythematous eczematous patches and plaques     Dystrophic onycholytic nail with subungual debris c/w onychomycosis     Umbilicated papule    Erythematous-base heme-crusted tan verrucoid plaque consistent  with inflamed seborrheic keratosis     Erythematous Silvery Scaling Plaque c/w Psoriasis     See annotation      Assessment / Plan:        Actinic keratoses  Cryosurgery Procedure Note    Verbal consent from the patient is obtained and the patient is aware of the precancerous quality and need for treatment of these lesions. Liquid nitrogen cryosurgery is applied to the 7 actinic keratoses, as detailed in the physical exam, to produce a freeze injury. The patient is aware that blisters may form and is instructed on wound care with gentle cleansing and use of vaseline ointment to keep moist until healed. The patient is supplied a handout on cryosurgery and is instructed to call if lesions do not completely resolve.    Seborrheic keratoses  These are benign inherited growths without a malignant potential. Reassurance given to patient. No treatment is necessary.     Solar lentigo  This is a benign hyperpigmented sun induced lesion. Daily sun protection will reduce the number of new lesions. Treatment of these benign lesions are considered cosmetic.    Multiple benign nevi  Careful dermoscopy evaluation of nevi performed with none identified as needing biopsy today  Monitor for new mole or moles that are becoming bigger, darker, irritated, or developing irregular borders.     History of nonmelanoma skin cancer  Scar  Area of previous melanoma examined. Site well healed with no signs of recurrence.    Total body skin examination performed today including at least 12 points as noted in physical examination. No lesions suspicious for malignancy noted.    Patient instructed in importance in daily broad spectrum sun protection of at least spf 30. Mineral sunscreen ingredients preferred (Zinc +/- Titanium) and can be found OTC.   Recommend Elta MD for daily use on face and neck.  Patient encouraged to wear hat for all outdoor exposure.   Also discussed sun avoidance and use of protective clothing.           Follow up in about  6 months (around 11/6/2022).

## 2022-05-06 NOTE — PATIENT INSTRUCTIONS

## 2022-07-11 ENCOUNTER — TELEPHONE (OUTPATIENT)
Dept: ENDOCRINOLOGY | Facility: CLINIC | Age: 77
End: 2022-07-11
Payer: MEDICARE

## 2022-07-11 DIAGNOSIS — M81.0 AGE-RELATED OSTEOPOROSIS WITHOUT CURRENT PATHOLOGICAL FRACTURE: Primary | ICD-10-CM

## 2022-07-11 NOTE — TELEPHONE ENCOUNTER
----- Message from Madina Ballesteros MA sent at 7/11/2022 11:21 AM CDT -----  Regarding: Labs  Contact: : [phone]  Jose Manuel Sood,    Patient is requesting lab before her Prolia injection.    Thanks,  Madina LUNDBERG       ----- Message -----  From: Megan Hagan  Sent: 7/11/2022  11:10 AM CDT  To: [facility] Staff    Type: Needs Medical Advice  Who Called:  Pt     Best Call Back Number: [phone]    Additional Information: Pt is calling to get labs put in for her 8/29 prolia injections. Pls call her back and tell pt what labs she needs.

## 2022-08-08 ENCOUNTER — LAB VISIT (OUTPATIENT)
Dept: LAB | Facility: HOSPITAL | Age: 77
End: 2022-08-08
Attending: INTERNAL MEDICINE
Payer: MEDICARE

## 2022-08-08 DIAGNOSIS — M81.0 AGE-RELATED OSTEOPOROSIS WITHOUT CURRENT PATHOLOGICAL FRACTURE: ICD-10-CM

## 2022-08-08 LAB
25(OH)D3+25(OH)D2 SERPL-MCNC: 57 NG/ML (ref 30–96)
ALBUMIN SERPL BCP-MCNC: 4.1 G/DL (ref 3.5–5.2)
ALP SERPL-CCNC: 42 U/L (ref 55–135)
ALT SERPL W/O P-5'-P-CCNC: 12 U/L (ref 10–44)
ANION GAP SERPL CALC-SCNC: 12 MMOL/L (ref 8–16)
AST SERPL-CCNC: 23 U/L (ref 10–40)
BILIRUB SERPL-MCNC: 0.6 MG/DL (ref 0.1–1)
BUN SERPL-MCNC: 27 MG/DL (ref 8–23)
CALCIUM SERPL-MCNC: 8.9 MG/DL (ref 8.7–10.5)
CHLORIDE SERPL-SCNC: 101 MMOL/L (ref 95–110)
CO2 SERPL-SCNC: 27 MMOL/L (ref 23–29)
CREAT SERPL-MCNC: 1.3 MG/DL (ref 0.5–1.4)
EST. GFR  (NO RACE VARIABLE): 42.1 ML/MIN/1.73 M^2
GLUCOSE SERPL-MCNC: 96 MG/DL (ref 70–110)
POTASSIUM SERPL-SCNC: 4.2 MMOL/L (ref 3.5–5.1)
PROT SERPL-MCNC: 6.9 G/DL (ref 6–8.4)
SODIUM SERPL-SCNC: 140 MMOL/L (ref 136–145)

## 2022-08-08 PROCEDURE — 82306 VITAMIN D 25 HYDROXY: CPT | Performed by: INTERNAL MEDICINE

## 2022-08-08 PROCEDURE — 80053 COMPREHEN METABOLIC PANEL: CPT | Performed by: INTERNAL MEDICINE

## 2022-08-08 PROCEDURE — 36415 COLL VENOUS BLD VENIPUNCTURE: CPT | Performed by: INTERNAL MEDICINE

## 2022-08-29 ENCOUNTER — INFUSION (OUTPATIENT)
Dept: INFUSION THERAPY | Facility: HOSPITAL | Age: 77
End: 2022-08-29
Attending: INTERNAL MEDICINE
Payer: MEDICARE

## 2022-08-29 VITALS
HEART RATE: 72 BPM | TEMPERATURE: 97 F | BODY MASS INDEX: 21.09 KG/M2 | DIASTOLIC BLOOD PRESSURE: 73 MMHG | OXYGEN SATURATION: 98 % | HEIGHT: 61 IN | RESPIRATION RATE: 18 BRPM | SYSTOLIC BLOOD PRESSURE: 154 MMHG | WEIGHT: 111.69 LBS

## 2022-08-29 DIAGNOSIS — M81.0 AGE-RELATED OSTEOPOROSIS WITHOUT CURRENT PATHOLOGICAL FRACTURE: Primary | ICD-10-CM

## 2022-08-29 PROCEDURE — 63600175 PHARM REV CODE 636 W HCPCS: Mod: JG | Performed by: INTERNAL MEDICINE

## 2022-08-29 PROCEDURE — 96372 THER/PROPH/DIAG INJ SC/IM: CPT

## 2022-08-29 RX ADMIN — DENOSUMAB 60 MG: 60 INJECTION SUBCUTANEOUS at 03:08

## 2022-08-29 NOTE — PLAN OF CARE
Problem: Fatigue  Goal: Improved Activity Tolerance  Intervention: Promote Improved Energy  Flowsheets (Taken 8/29/2022 1517)  Fatigue Management:   paced activity encouraged   frequent rest breaks encouraged

## 2022-09-15 ENCOUNTER — TELEPHONE (OUTPATIENT)
Dept: ENDOCRINOLOGY | Facility: CLINIC | Age: 77
End: 2022-09-15
Payer: MEDICARE

## 2022-09-15 NOTE — TELEPHONE ENCOUNTER
----- Message from Madina Ballesteros MA sent at 8/30/2022  3:38 PM CDT -----    ----- Message -----  From: Eva Cisneros  Sent: 8/30/2022  12:13 PM CDT  To: [facility] Staff      Patient received PROLIA on:  8/29/22    THE NEXT DOSE IS SCHEDULED FOR :  3/3/2023    To reschedule appointments, please contact [facility] INFUSION SCHEDULING POOL or call [phone].

## 2022-11-07 ENCOUNTER — OFFICE VISIT (OUTPATIENT)
Dept: DERMATOLOGY | Facility: CLINIC | Age: 77
End: 2022-11-07
Payer: MEDICARE

## 2022-11-07 DIAGNOSIS — D22.9 MULTIPLE BENIGN NEVI: ICD-10-CM

## 2022-11-07 DIAGNOSIS — L81.4 SOLAR LENTIGO: ICD-10-CM

## 2022-11-07 DIAGNOSIS — L90.5 SCAR: ICD-10-CM

## 2022-11-07 DIAGNOSIS — L82.1 SEBORRHEIC KERATOSES: ICD-10-CM

## 2022-11-07 DIAGNOSIS — Z85.828 HISTORY OF NONMELANOMA SKIN CANCER: ICD-10-CM

## 2022-11-07 DIAGNOSIS — L57.0 ACTINIC KERATOSES: Primary | ICD-10-CM

## 2022-11-07 PROCEDURE — 99213 PR OFFICE/OUTPT VISIT, EST, LEVL III, 20-29 MIN: ICD-10-PCS | Mod: 25,S$PBB,, | Performed by: DERMATOLOGY

## 2022-11-07 PROCEDURE — 17003 DESTRUCT PREMALG LES 2-14: CPT | Mod: PBBFAC,PO | Performed by: DERMATOLOGY

## 2022-11-07 PROCEDURE — 99999 PR PBB SHADOW E&M-EST. PATIENT-LVL II: ICD-10-PCS | Mod: PBBFAC,,, | Performed by: DERMATOLOGY

## 2022-11-07 PROCEDURE — 17000 DESTRUCT PREMALG LESION: CPT | Mod: PBBFAC,PO | Performed by: DERMATOLOGY

## 2022-11-07 PROCEDURE — 17003 DESTRUCT PREMALG LES 2-14: CPT | Mod: S$PBB,,, | Performed by: DERMATOLOGY

## 2022-11-07 PROCEDURE — 17000 PR DESTRUCTION(LASER SURGERY,CRYOSURGERY,CHEMOSURGERY),PREMALIGNANT LESIONS,FIRST LESION: ICD-10-PCS | Mod: S$PBB,,, | Performed by: DERMATOLOGY

## 2022-11-07 PROCEDURE — 17000 DESTRUCT PREMALG LESION: CPT | Mod: S$PBB,,, | Performed by: DERMATOLOGY

## 2022-11-07 PROCEDURE — 99999 PR PBB SHADOW E&M-EST. PATIENT-LVL II: CPT | Mod: PBBFAC,,, | Performed by: DERMATOLOGY

## 2022-11-07 PROCEDURE — 99212 OFFICE O/P EST SF 10 MIN: CPT | Mod: PBBFAC,PO | Performed by: DERMATOLOGY

## 2022-11-07 PROCEDURE — 17003 DESTRUCTION, PREMALIGNANT LESIONS; SECOND THROUGH 14 LESIONS: ICD-10-PCS | Mod: S$PBB,,, | Performed by: DERMATOLOGY

## 2022-11-07 PROCEDURE — 99213 OFFICE O/P EST LOW 20 MIN: CPT | Mod: 25,S$PBB,, | Performed by: DERMATOLOGY

## 2022-11-07 NOTE — PATIENT INSTRUCTIONS

## 2022-11-07 NOTE — PROGRESS NOTES
Subjective:       Patient ID:  Suki Macias is a 77 y.o. female who presents for   Chief Complaint   Patient presents with    Skin Check     TBSE     LOV 5/6/22 - AK, SK, Lentigo, Nevi, Scar    Patient here today for skin check TBSE  Patient states she has a few spots on right cheek  C/o spot on scalp x 3 weeks.     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   Respiratory:  Negative for cough and shortness of breath.    Gastrointestinal:  Negative for nausea and vomiting.   Skin:  Positive for activity-related sunscreen use and wears hat. Negative for daily sunscreen use.   Hematologic/Lymphatic: Bruises/bleeds easily.      Objective:    Physical Exam   Constitutional: She appears well-developed and well-nourished. No distress.   Neurological: She is alert and oriented to person, place, and time. She is not disoriented.   Psychiatric: She has a normal mood and affect.   Skin:   Areas Examined (abnormalities noted in diagram):   Scalp / Hair Palpated and Inspected  Head / Face Inspection Performed  Neck Inspection Performed  Chest / Axilla Inspection Performed  Abdomen Inspection Performed  Genitals / Buttocks / Groin Inspection Performed  Back Inspection Performed  RUE Inspected  LUE Inspection Performed  RLE Inspected  LLE Inspection Performed  Nails and Digits Inspection Performed                     Diagram Legend     Erythematous scaling macule/papule c/w actinic keratosis       Vascular papule c/w angioma      Pigmented verrucoid papule/plaque c/w seborrheic keratosis      Yellow umbilicated papule c/w sebaceous hyperplasia      Irregularly shaped tan macule c/w lentigo     1-2 mm smooth white papules consistent with Milia      Movable subcutaneous cyst with punctum c/w epidermal inclusion cyst      Subcutaneous movable cyst c/w  pilar cyst      Firm pink to brown papule c/w dermatofibroma      Pedunculated fleshy papule(s) c/w skin tag(s)      Evenly pigmented macule c/w junctional nevus     Mildly variegated pigmented, slightly irregular-bordered macule c/w mildly atypical nevus      Flesh colored to evenly pigmented papule c/w intradermal nevus       Pink pearly papule/plaque c/w basal cell carcinoma      Erythematous hyperkeratotic cursted plaque c/w SCC      Surgical scar with no sign of skin cancer recurrence      Open and closed comedones      Inflammatory papules and pustules      Verrucoid papule consistent consistent with wart     Erythematous eczematous patches and plaques     Dystrophic onycholytic nail with subungual debris c/w onychomycosis     Umbilicated papule    Erythematous-base heme-crusted tan verrucoid plaque consistent with inflamed seborrheic keratosis     Erythematous Silvery Scaling Plaque c/w Psoriasis     See annotation      Assessment / Plan:        Actinic keratoses  Cryosurgery Procedure Note    Verbal consent from the patient is obtained and the patient is aware of the precancerous quality and need for treatment of these lesions. Liquid nitrogen cryosurgery is applied to the 5 actinic keratoses, as detailed in the physical exam, to produce a freeze injury. The patient is aware that blisters may form and is instructed on wound care with gentle cleansing and use of vaseline ointment to keep moist until healed. The patient is supplied a handout on cryosurgery and is instructed to call if lesions do not completely resolve.    History of nonmelanoma skin cancer  Scar  Area of previous NMSC (scalp) examined. Site well healed with no signs of recurrence.    Total body skin examination performed today including at least 12 points as noted in physical examination. No lesions suspicious for malignancy noted.    Seborrheic keratoses  These are benign inherited growths without a malignant potential. Reassurance given to  patient. No treatment is necessary.     Solar lentigo  This is a benign hyperpigmented sun induced lesion. Daily sun protection will reduce the number of new lesions. Treatment of these benign lesions are considered cosmetic.    Multiple benign nevi  Careful dermoscopy evaluation of nevi performed with none identified as needing biopsy today  Monitor for new mole or moles that are becoming bigger, darker, irritated, or developing irregular borders.     Patient instructed in importance in daily broad spectrum sun protection of at least spf 30. Mineral sunscreen ingredients preferred (Zinc +/- Titanium) and can be found OTC.   Recommend Elta MD for daily use on face and neck.  Patient encouraged to wear hat for all outdoor exposure.   Also discussed sun avoidance and use of protective clothing.             Follow up in about 6 months (around 5/7/2023).

## 2022-11-15 ENCOUNTER — TELEPHONE (OUTPATIENT)
Dept: FAMILY MEDICINE | Facility: CLINIC | Age: 77
End: 2022-11-15
Payer: MEDICARE

## 2023-01-20 ENCOUNTER — OFFICE VISIT (OUTPATIENT)
Dept: FAMILY MEDICINE | Facility: CLINIC | Age: 78
End: 2023-01-20
Payer: MEDICARE

## 2023-01-20 VITALS
HEART RATE: 68 BPM | RESPIRATION RATE: 15 BRPM | HEIGHT: 61 IN | OXYGEN SATURATION: 95 % | BODY MASS INDEX: 20.79 KG/M2 | SYSTOLIC BLOOD PRESSURE: 160 MMHG | WEIGHT: 110.13 LBS | DIASTOLIC BLOOD PRESSURE: 86 MMHG

## 2023-01-20 DIAGNOSIS — Z00.00 ANNUAL PHYSICAL EXAM: Primary | ICD-10-CM

## 2023-01-20 DIAGNOSIS — Z12.31 ENCOUNTER FOR SCREENING MAMMOGRAM FOR MALIGNANT NEOPLASM OF BREAST: ICD-10-CM

## 2023-01-20 DIAGNOSIS — E21.3 HYPERPARATHYROIDISM, UNSPECIFIED: ICD-10-CM

## 2023-01-20 DIAGNOSIS — R73.9 HYPERGLYCEMIA: ICD-10-CM

## 2023-01-20 DIAGNOSIS — E78.00 HYPERCHOLESTEROLEMIA: ICD-10-CM

## 2023-01-20 DIAGNOSIS — E89.0 POSTABLATIVE HYPOTHYROIDISM: ICD-10-CM

## 2023-01-20 DIAGNOSIS — I10 PRIMARY HYPERTENSION: ICD-10-CM

## 2023-01-20 PROCEDURE — 99999 PR PBB SHADOW E&M-EST. PATIENT-LVL IV: ICD-10-PCS | Mod: PBBFAC,,, | Performed by: FAMILY MEDICINE

## 2023-01-20 PROCEDURE — 99999 PR PBB SHADOW E&M-EST. PATIENT-LVL IV: CPT | Mod: PBBFAC,,, | Performed by: FAMILY MEDICINE

## 2023-01-20 PROCEDURE — 99397 PR PREVENTIVE VISIT,EST,65 & OVER: ICD-10-PCS | Mod: S$PBB,,, | Performed by: FAMILY MEDICINE

## 2023-01-20 PROCEDURE — 99397 PER PM REEVAL EST PAT 65+ YR: CPT | Mod: S$PBB,,, | Performed by: FAMILY MEDICINE

## 2023-01-20 PROCEDURE — 80326 AMPHETAMINES 5 OR MORE: CPT | Performed by: FAMILY MEDICINE

## 2023-01-20 PROCEDURE — 99214 OFFICE O/P EST MOD 30 MIN: CPT | Mod: PBBFAC,PN | Performed by: FAMILY MEDICINE

## 2023-01-20 RX ORDER — DENOSUMAB 60 MG/ML
INJECTION SUBCUTANEOUS
COMMUNITY
Start: 2022-02-25

## 2023-01-20 NOTE — PROGRESS NOTES
" Patient ID: Suki Macias is a 78 y.o. female.    Chief Complaint: Annual Exam      Reviewed family, medical, surgical, and social history.    No cp/sob  No change in mental status  No fever  No asymmetrical limb swelling    Objective:      BP (!) 160/86 (BP Location: Right arm, Patient Position: Sitting, BP Method: Medium (Manual))   Pulse 68   Resp 15   Ht 5' 1" (1.549 m)   Wt 49.9 kg (110 lb 1.6 oz)   SpO2 95%   BMI 20.80 kg/m²   RRR, CTAB, s/nt/nd, no c/c/e, non-toxic appearing, no focal weakness  Assessment:       1. Annual physical exam    2. Primary hypertension    3. Hypercholesterolemia    4. Postablative hypothyroidism    5. Hyperglycemia    6. Hyperparathyroidism, unspecified    7. Encounter for screening mammogram for malignant neoplasm of breast        Plan:       Annual physical exam  -     [facility] Drug Screen  -     CBC Auto Differential; Future; Expected date: 01/20/2023  -     Comprehensive Metabolic Panel; Future; Expected date: 01/20/2023  -     Hemoglobin A1C; Future; Expected date: 01/20/2023  -     Lipid Panel; Future; Expected date: 01/20/2023  -     TSH; Future; Expected date: 01/20/2023  -     T4, Free; Future; Expected date: 01/20/2023  -     Vitamin D; Future; Expected date: 01/20/2023  -     PTH, intact; Future; Expected date: 01/20/2023    Primary hypertension  -     [facility] Drug Screen  -     CBC Auto Differential; Future; Expected date: 01/20/2023  -     Comprehensive Metabolic Panel; Future; Expected date: 01/20/2023  -     Hemoglobin A1C; Future; Expected date: 01/20/2023  -     Lipid Panel; Future; Expected date: 01/20/2023  -     TSH; Future; Expected date: 01/20/2023  -     T4, Free; Future; Expected date: 01/20/2023  -     Vitamin D; Future; Expected date: 01/20/2023  -     PTH, intact; Future; Expected date: 01/20/2023    Hypercholesterolemia  -     [facility] Clinic Drug Screen  -     CBC Auto Differential; Future; Expected date: 01/20/2023  -     Comprehensive " Metabolic Panel; Future; Expected date: 01/20/2023  -     Hemoglobin A1C; Future; Expected date: 01/20/2023  -     Lipid Panel; Future; Expected date: 01/20/2023  -     TSH; Future; Expected date: 01/20/2023  -     T4, Free; Future; Expected date: 01/20/2023  -     Vitamin D; Future; Expected date: 01/20/2023  -     PTH, intact; Future; Expected date: 01/20/2023    Postablative hypothyroidism  -     [facility] Drug Screen  -     CBC Auto Differential; Future; Expected date: 01/20/2023  -     Comprehensive Metabolic Panel; Future; Expected date: 01/20/2023  -     Hemoglobin A1C; Future; Expected date: 01/20/2023  -     Lipid Panel; Future; Expected date: 01/20/2023  -     TSH; Future; Expected date: 01/20/2023  -     T4, Free; Future; Expected date: 01/20/2023  -     Vitamin D; Future; Expected date: 01/20/2023  -     PTH, intact; Future; Expected date: 01/20/2023    Hyperglycemia  -     [facility] Clinic Drug Screen  -     CBC Auto Differential; Future; Expected date: 01/20/2023  -     Comprehensive Metabolic Panel; Future; Expected date: 01/20/2023  -     Hemoglobin A1C; Future; Expected date: 01/20/2023  -     Lipid Panel; Future; Expected date: 01/20/2023  -     TSH; Future; Expected date: 01/20/2023  -     T4, Free; Future; Expected date: 01/20/2023  -     Vitamin D; Future; Expected date: 01/20/2023  -     PTH, intact; Future; Expected date: 01/20/2023    Hyperparathyroidism, unspecified  -     Vitamin D; Future; Expected date: 01/20/2023    Encounter for screening mammogram for malignant neoplasm of breast  -     Mammo Digital Screening Bilat; Future; Expected date: 01/20/2023              Continue current medicines, any changes noted above  Labs, radiology studies, and procedures/notes from the last 3 months were reviewed.    Risks, benefits, and side effects were discussed with the patient. All questions were answered to the fullest satisfaction of the patient, and pt verbalized understanding and agreement to  treatment plan. Pt was to call with any new or worsening symptoms, or present to the ER.

## 2023-01-27 LAB
6MAM UR QL: NOT DETECTED
7AMINOCLONAZEPAM UR QL: NOT DETECTED
A-OH ALPRAZ UR QL: NOT DETECTED
ALPHA-OH-MIDAZOLAM: NOT DETECTED
ALPRAZ UR QL: NOT DETECTED
AMPHET UR QL SCN: NOT DETECTED
ANNOTATION COMMENT IMP: NORMAL
ANNOTATION COMMENT IMP: NORMAL
BARBITURATES UR QL: NOT DETECTED
BUPRENORPHINE UR QL: NOT DETECTED
BZE UR QL: NOT DETECTED
CARBOXYTHC UR QL: NOT DETECTED
CARISOPRODOL UR QL: NOT DETECTED
CLONAZEPAM UR QL: NOT DETECTED
CODEINE UR QL: NOT DETECTED
CREAT UR-MCNC: 128.4 MG/DL (ref 20–400)
DIAZEPAM UR QL: NOT DETECTED
ETHYL GLUCURONIDE UR QL: PRESENT
FENTANYL UR QL: NOT DETECTED
GABAPENTIN: NOT DETECTED
HYDROCODONE UR QL: NOT DETECTED
HYDROMORPHONE UR QL: NOT DETECTED
LORAZEPAM UR QL: NOT DETECTED
MDA UR QL: NOT DETECTED
MDEA UR QL: NOT DETECTED
MDMA UR QL: NOT DETECTED
ME-PHENIDATE UR QL: NOT DETECTED
METHADONE UR QL: NOT DETECTED
METHAMPHET UR QL: NOT DETECTED
MIDAZOLAM UR QL SCN: NOT DETECTED
MORPHINE UR QL: NOT DETECTED
NALOXONE: NOT DETECTED
NORBUPRENORPHINE UR QL CFM: NOT DETECTED
NORDIAZEPAM UR QL: NOT DETECTED
NORFENTANYL UR QL: NOT DETECTED
NORHYDROCODONE UR QL CFM: NOT DETECTED
NORMEPERIDINE UR QL CFM: NOT DETECTED
NOROXYCODONE UR QL CFM: NOT DETECTED
NOROXYMORPHONE UR QL SCN: NOT DETECTED
OXAZEPAM UR QL: NOT DETECTED
OXYCODONE UR QL: NOT DETECTED
OXYMORPHONE UR QL: NOT DETECTED
PATHOLOGY STUDY: NORMAL
PCP UR QL: NOT DETECTED
PHENTERMINE UR QL: NOT DETECTED
PREGABALIN: NOT DETECTED
SERVICE CMNT-IMP: NORMAL
TAPENTADOL UR QL SCN: NOT DETECTED
TAPENTADOL UR QL SCN: NOT DETECTED
TEMAZEPAM UR QL: NOT DETECTED
TRAMADOL UR QL: PRESENT
ZOLPIDEM METABOLITE: NOT DETECTED
ZOLPIDEM UR QL: NOT DETECTED

## 2023-02-09 ENCOUNTER — OFFICE VISIT (OUTPATIENT)
Dept: ENDOCRINOLOGY | Facility: CLINIC | Age: 78
End: 2023-02-09
Payer: MEDICARE

## 2023-02-09 VITALS
TEMPERATURE: 98 F | SYSTOLIC BLOOD PRESSURE: 150 MMHG | DIASTOLIC BLOOD PRESSURE: 60 MMHG | HEART RATE: 85 BPM | BODY MASS INDEX: 21.37 KG/M2 | WEIGHT: 113.13 LBS | RESPIRATION RATE: 16 BRPM

## 2023-02-09 DIAGNOSIS — M81.0 AGE-RELATED OSTEOPOROSIS WITHOUT CURRENT PATHOLOGICAL FRACTURE: Primary | ICD-10-CM

## 2023-02-09 DIAGNOSIS — R20.0 NUMBNESS: ICD-10-CM

## 2023-02-09 DIAGNOSIS — E21.3 HYPERPARATHYROIDISM: ICD-10-CM

## 2023-02-09 DIAGNOSIS — E89.0 POSTABLATIVE HYPOTHYROIDISM: ICD-10-CM

## 2023-02-09 PROBLEM — E11.9 TYPE 2 DIABETES MELLITUS WITHOUT COMPLICATION, WITHOUT LONG-TERM CURRENT USE OF INSULIN: Status: RESOLVED | Noted: 2019-05-30 | Resolved: 2023-02-09

## 2023-02-09 PROCEDURE — 99999 PR PBB SHADOW E&M-EST. PATIENT-LVL III: CPT | Mod: PBBFAC,,, | Performed by: INTERNAL MEDICINE

## 2023-02-09 PROCEDURE — 99999 PR PBB SHADOW E&M-EST. PATIENT-LVL III: ICD-10-PCS | Mod: PBBFAC,,, | Performed by: INTERNAL MEDICINE

## 2023-02-09 PROCEDURE — 99213 OFFICE O/P EST LOW 20 MIN: CPT | Mod: PBBFAC,PO | Performed by: INTERNAL MEDICINE

## 2023-02-09 PROCEDURE — 99214 OFFICE O/P EST MOD 30 MIN: CPT | Mod: S$PBB,,, | Performed by: INTERNAL MEDICINE

## 2023-02-09 PROCEDURE — 99214 PR OFFICE/OUTPT VISIT, EST, LEVL IV, 30-39 MIN: ICD-10-PCS | Mod: S$PBB,,, | Performed by: INTERNAL MEDICINE

## 2023-02-09 NOTE — ASSESSMENT & PLAN NOTE
Seen on last DXA.   Osteopenia on BMD, but FRAX 3.3% hip, consistent with osteoporosis   had discussed indications for treatment, hip fracture risk >3%   - no falls/fractures.   - encourage pt continue vit D (1,000 units/day) and calcium (1200 mg per day)  On prolia. Tolerating well. Continue  Recheck DXA next year.  Avoid falls     [place]

## 2023-02-09 NOTE — PROGRESS NOTES
Subjective:      Chief Complaint: Osteoporosis and Hyperparathyroidism    HPI: Suki Macias is a 78 y.o. female who is here for a follow-up evaluation for hyperparathyroidism. Last seen 2/10/2022    Disease history:  Reports GI issues about 1.5 years ago. On steroids, didn't get better. Saw somebody else who found high calcium, prompting additional workup. Saw endocrine (Dr. Schmitz) to eval calcium, parathyroid.     Had elevated PTH for a while. Since at least 2018.   Calcium mildly elevated off and on, up to 11.6 at the highest, though last Ca normal.    Prior labs:        Lab Results   Component Value Date     CALCIUM 11.2 (H) 07/14/2020     CALCIUM 11.2 (H) 07/14/2020     ALBUMIN 3.8 07/14/2020     ALBUMIN 3.8 07/14/2020     CREATININE 1.1 07/14/2020     CREATININE 1.1 07/14/2020     CREATININE 1.1 07/14/2020     [id number] 31 03/13/2020     .0 (H) 07/14/2020    GFR 49 at that time    Prior labs:            Lab Results   Component Value Date     CALCIUM 10.0 03/13/2020     ALBUMIN 4.0 04/22/2019     CREATININE 1.0 03/13/2020     [id number] 31 03/13/2020     .0 (H) 03/13/2020   Had 24 hr urine Ca of 140 (volume only 600, no creatinine at that time).     Repeat 24 hr urine 7/2020: Volume 500. Creat 800   calcium 192. Low, but with low volume need to compare with creatinine.  Serum Ca 11.2, alb 3.8, Cr 1.1   Ratio: 0.023   - Not suggestive of FHH     Neck US 7/8/2020:   Potential parathyroid adenoma left upper pole. 1.2x0.8x0.6 cm in size, hypoechoic.    Then had parathyroid surgery October 2020. With [facility] in [place], [place]. No records here, but pt remembers they removed 2 parathyroid glands. Benign.    Last labs:  Lab Results   Component Value Date    CALCIUM 8.9 08/08/2022    ALBUMIN 4.1 08/08/2022    CREATININE 1.3 08/08/2022    [id number] 57 08/08/2022    PTH 23.5 02/21/2022     Bones:   DXA 12/2021: Osteoporosis based on elevated FRAX 12% major, 3.3% hip   spine 2.6.  BMD 1.022   left fem neck -1.9, BMD 0.639     DXA 11/2019: normal   Forearm tscore 0.8  DXA 10/2019: osteopenia. FRAX 11% major, 2.7% hip.   spine tscore +2.0   Left hip -1.8     Not smoking.  No FH hip fractures. +FH osteoporosis (patient's mother)    Treatment:  Prolia. Started: 2/28/2022  Last dose 8/29/2022    Vitamins:    calcium intake: On supplement OTC BID. 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 She has had some issues with getting dose regulated.     Lab Results   Component Value Date    TSH 2.754 11/29/2021    V7ZRYJX 64 12/04/2020    N4PZTYK 16.3 (H) 06/19/2017    FREET4 1.27 11/29/2021     Thyroid medication: On 88 mcg/day Synthroid (brand medication).    Has HTN   Checks BP at home, reports normal there    Today, pt reports feeling well.     Hand problems. Arthritis. Worse per patient. Pt wondering about calcium.  No falls/fractures.  Weight fairly stable    Saw dentist, no issues pending there.      Reviewed past medical, family, social history and updated as appropriate.    Review of Systems   As above.    Objective:     Vitals:    02/09/23 1455   BP: (!) 150/60   Pulse: 85   Resp: 16   Temp: 97.7 °F (36.5 °C)     BP Readings from Last 5 Encounters:   02/09/23 (!) 150/60   01/20/23 (!) 160/86   08/29/22 (!) 154/73   02/28/22 (!) 174/89   02/10/22 130/70     Physical Exam  Vitals reviewed.   Constitutional:       General: She is not in acute distress.  Neck:      Thyroid: No thyromegaly.      Comments: +scar  Cardiovascular:      Heart sounds: Normal heart sounds.   Pulmonary:      Effort: Pulmonary effort is normal.       Wt Readings from Last 10 Encounters:   02/09/23 1455 51.3 kg (113 lb 1.5 oz)   01/20/23 1256 49.9 kg (110 lb 1.6 oz)   08/29/22 1511 50.7 kg (111 lb 11.2 oz)   05/06/22 0843 48.1 kg (106 lb)   02/28/22 1607 50.4 kg (111 lb 3.2 oz)   02/10/22 0812 51 kg (112 lb 7 oz)   11/26/21 1020 49.3 kg (108  lb 11.2 oz)   08/23/21 1043 50.1 kg (110 lb 8 oz)   05/21/21 1111 49 kg (108 lb)   05/07/21 1143 49.5 kg (109 lb 3.2 oz)       Lab Results   Component Value Date    HGBA1C 5.6 11/29/2021     Lab Results   Component Value Date    CHOL 243 (H) 11/29/2021    HDL 80 (H) 11/29/2021    LDLCALC 140.8 11/29/2021    TRIG 111 11/29/2021    CHOLHDL 32.9 11/29/2021     Lab Results   Component Value Date     08/08/2022    K 4.2 08/08/2022     08/08/2022    CO2 27 08/08/2022    GLU 96 08/08/2022    BUN 27 (H) 08/08/2022    CREATININE 1.3 08/08/2022    CALCIUM 8.9 08/08/2022    PROT 6.9 08/08/2022    ALBUMIN 4.1 08/08/2022    BILITOT 0.6 08/08/2022    ALKPHOS 42 (L) 08/08/2022    AST 23 08/08/2022    ALT 12 08/08/2022    ANIONGAP 12 08/08/2022    ESTGFRAFRICA 50.0 (A) 02/21/2022    EGFRNONAA 43.4 (A) 02/21/2022    TSH 2.754 11/29/2021        Assessment/Plan:     Hyperparathyroidism  Primary hyperparathyroidism.   - with CKD, osteoporosis   - s/p 2 gland parathyroidectomy 10/2020 in [place]   - last PTH, calcium normal   - still on calcium BID.   recheck levels      Age-related osteoporosis without current pathological fracture  Seen on last DXA.   Osteopenia on BMD, but FRAX 3.3% hip, consistent with osteoporosis   had discussed indications for treatment, hip fracture risk >3%   - no falls/fractures.   - encourage pt continue vit D (1,000 units/day) and calcium (1200 mg per day)  On prolia. Tolerating well. Continue  Recheck DXA next year.  Avoid falls      Hypothyroid  Hx Graves disease, s/p RAIA decades ago, now hypothyroid   - on synthroid 88, clinically euthyroid   - last labs normal TSH   - continue same dose   - recheck 1-2 times a year. Or sooner if symptoms   f/u with PCP    Follow up in about 1 year (around 2/9/2024) for further monitoring, lab review, imaging review.      Aman Sood MD  Endocrinology

## 2023-02-09 NOTE — ASSESSMENT & PLAN NOTE
Primary hyperparathyroidism.   - with CKD, osteoporosis   - s/p 2 gland parathyroidectomy 10/2020 in [place]   - last PTH, calcium normal   - still on calcium BID.   recheck levels

## 2023-02-27 ENCOUNTER — LAB VISIT (OUTPATIENT)
Dept: LAB | Facility: HOSPITAL | Age: 78
End: 2023-02-27
Attending: FAMILY MEDICINE
Payer: MEDICARE

## 2023-02-27 DIAGNOSIS — I10 PRIMARY HYPERTENSION: ICD-10-CM

## 2023-02-27 DIAGNOSIS — E21.3 HYPERPARATHYROIDISM: ICD-10-CM

## 2023-02-27 DIAGNOSIS — E89.0 POSTABLATIVE HYPOTHYROIDISM: ICD-10-CM

## 2023-02-27 DIAGNOSIS — E21.3 HYPERPARATHYROIDISM, UNSPECIFIED: ICD-10-CM

## 2023-02-27 DIAGNOSIS — R73.9 HYPERGLYCEMIA: ICD-10-CM

## 2023-02-27 DIAGNOSIS — Z00.00 ANNUAL PHYSICAL EXAM: ICD-10-CM

## 2023-02-27 DIAGNOSIS — E78.00 HYPERCHOLESTEROLEMIA: ICD-10-CM

## 2023-02-27 DIAGNOSIS — R20.0 NUMBNESS: ICD-10-CM

## 2023-02-27 LAB
ALBUMIN SERPL BCP-MCNC: 4.1 G/DL (ref 3.5–5.2)
ALP SERPL-CCNC: 44 U/L (ref 55–135)
ALT SERPL W/O P-5'-P-CCNC: 13 U/L (ref 10–44)
ANION GAP SERPL CALC-SCNC: 12 MMOL/L (ref 8–16)
AST SERPL-CCNC: 20 U/L (ref 10–40)
BASOPHILS # BLD AUTO: 0.04 K/UL (ref 0–0.2)
BASOPHILS NFR BLD: 0.6 % (ref 0–1.9)
BILIRUB SERPL-MCNC: 0.5 MG/DL (ref 0.1–1)
BUN SERPL-MCNC: 26 MG/DL (ref 8–23)
CALCIUM SERPL-MCNC: 9.3 MG/DL (ref 8.7–10.5)
CHLORIDE SERPL-SCNC: 104 MMOL/L (ref 95–110)
CHOLEST SERPL-MCNC: 259 MG/DL (ref 120–199)
CHOLEST/HDLC SERPL: 3.3 {RATIO} (ref 2–5)
CO2 SERPL-SCNC: 25 MMOL/L (ref 23–29)
CREAT SERPL-MCNC: 1.2 MG/DL (ref 0.5–1.4)
DIFFERENTIAL METHOD: NORMAL
EOSINOPHIL # BLD AUTO: 0.1 K/UL (ref 0–0.5)
EOSINOPHIL NFR BLD: 1.6 % (ref 0–8)
ERYTHROCYTE [DISTWIDTH] IN BLOOD BY AUTOMATED COUNT: 14.2 % (ref 11.5–14.5)
EST. GFR  (NO RACE VARIABLE): 46 ML/MIN/1.73 M^2
ESTIMATED AVG GLUCOSE: 120 MG/DL (ref 68–131)
GLUCOSE SERPL-MCNC: 97 MG/DL (ref 70–110)
HBA1C MFR BLD: 5.8 % (ref 4–5.6)
HCT VFR BLD AUTO: 42.1 % (ref 37–48.5)
HDLC SERPL-MCNC: 79 MG/DL (ref 40–75)
HDLC SERPL: 30.5 % (ref 20–50)
HGB BLD-MCNC: 13.9 G/DL (ref 12–16)
IMM GRANULOCYTES # BLD AUTO: 0.01 K/UL (ref 0–0.04)
IMM GRANULOCYTES NFR BLD AUTO: 0.2 % (ref 0–0.5)
LDLC SERPL CALC-MCNC: 157.4 MG/DL (ref 63–159)
LYMPHOCYTES # BLD AUTO: 1.8 K/UL (ref 1–4.8)
LYMPHOCYTES NFR BLD: 28.9 % (ref 18–48)
MAGNESIUM SERPL-MCNC: 1.8 MG/DL (ref 1.6–2.6)
MCH RBC QN AUTO: 30 PG (ref 27–31)
MCHC RBC AUTO-ENTMCNC: 33 G/DL (ref 32–36)
MCV RBC AUTO: 91 FL (ref 82–98)
MONOCYTES # BLD AUTO: 0.6 K/UL (ref 0.3–1)
MONOCYTES NFR BLD: 9 % (ref 4–15)
NEUTROPHILS # BLD AUTO: 3.8 K/UL (ref 1.8–7.7)
NEUTROPHILS NFR BLD: 59.7 % (ref 38–73)
NONHDLC SERPL-MCNC: 180 MG/DL
NRBC BLD-RTO: 0 /100 WBC
PHOSPHATE SERPL-MCNC: 3.1 MG/DL (ref 2.7–4.5)
PLATELET # BLD AUTO: 367 K/UL (ref 150–450)
PMV BLD AUTO: 10.3 FL (ref 9.2–12.9)
POTASSIUM SERPL-SCNC: 4.4 MMOL/L (ref 3.5–5.1)
PROT SERPL-MCNC: 7 G/DL (ref 6–8.4)
PTH-INTACT SERPL-MCNC: 20.9 PG/ML (ref 9–77)
RBC # BLD AUTO: 4.63 M/UL (ref 4–5.4)
SODIUM SERPL-SCNC: 141 MMOL/L (ref 136–145)
T4 FREE SERPL-MCNC: 1.44 NG/DL (ref 0.71–1.51)
TRIGL SERPL-MCNC: 113 MG/DL (ref 30–150)
TSH SERPL DL<=0.005 MIU/L-ACNC: 2.6 UIU/ML (ref 0.4–4)
WBC # BLD AUTO: 6.34 K/UL (ref 3.9–12.7)

## 2023-02-27 PROCEDURE — 82607 VITAMIN B-12: CPT | Performed by: INTERNAL MEDICINE

## 2023-02-27 PROCEDURE — 84443 ASSAY THYROID STIM HORMONE: CPT | Performed by: FAMILY MEDICINE

## 2023-02-27 PROCEDURE — 80053 COMPREHEN METABOLIC PANEL: CPT | Performed by: FAMILY MEDICINE

## 2023-02-27 PROCEDURE — 84100 ASSAY OF PHOSPHORUS: CPT | Performed by: INTERNAL MEDICINE

## 2023-02-27 PROCEDURE — 84439 ASSAY OF FREE THYROXINE: CPT | Performed by: FAMILY MEDICINE

## 2023-02-27 PROCEDURE — 82306 VITAMIN D 25 HYDROXY: CPT | Performed by: FAMILY MEDICINE

## 2023-02-27 PROCEDURE — 36415 COLL VENOUS BLD VENIPUNCTURE: CPT | Performed by: FAMILY MEDICINE

## 2023-02-27 PROCEDURE — 83735 ASSAY OF MAGNESIUM: CPT | Performed by: INTERNAL MEDICINE

## 2023-02-27 PROCEDURE — 80061 LIPID PANEL: CPT | Performed by: FAMILY MEDICINE

## 2023-02-27 PROCEDURE — 83036 HEMOGLOBIN GLYCOSYLATED A1C: CPT | Performed by: FAMILY MEDICINE

## 2023-02-27 PROCEDURE — 83970 ASSAY OF PARATHORMONE: CPT | Performed by: FAMILY MEDICINE

## 2023-02-27 PROCEDURE — 85025 COMPLETE CBC W/AUTO DIFF WBC: CPT | Performed by: FAMILY MEDICINE

## 2023-02-28 LAB
25(OH)D3+25(OH)D2 SERPL-MCNC: 64 NG/ML (ref 30–96)
VIT B12 SERPL-MCNC: 1219 PG/ML (ref 210–950)

## 2023-03-03 ENCOUNTER — INFUSION (OUTPATIENT)
Dept: INFUSION THERAPY | Facility: HOSPITAL | Age: 78
End: 2023-03-03
Attending: INTERNAL MEDICINE
Payer: MEDICARE

## 2023-03-03 VITALS
TEMPERATURE: 99 F | DIASTOLIC BLOOD PRESSURE: 88 MMHG | BODY MASS INDEX: 20.78 KG/M2 | RESPIRATION RATE: 16 BRPM | OXYGEN SATURATION: 96 % | HEART RATE: 69 BPM | WEIGHT: 110 LBS | SYSTOLIC BLOOD PRESSURE: 187 MMHG

## 2023-03-03 DIAGNOSIS — M81.0 AGE-RELATED OSTEOPOROSIS WITHOUT CURRENT PATHOLOGICAL FRACTURE: Primary | ICD-10-CM

## 2023-03-03 PROCEDURE — 96372 THER/PROPH/DIAG INJ SC/IM: CPT

## 2023-03-03 PROCEDURE — 63600175 PHARM REV CODE 636 W HCPCS: Mod: JZ,JG | Performed by: PHYSICIAN ASSISTANT

## 2023-03-03 RX ADMIN — DENOSUMAB 60 MG: 60 INJECTION SUBCUTANEOUS at 11:03

## 2023-03-03 NOTE — PLAN OF CARE
Problem: Fall Injury Risk  Goal: Absence of Fall and Fall-Related Injury  Outcome: Ongoing, Progressing  Intervention: Identify and Manage Contributors  Flowsheets (Taken 3/3/2023 1122)  Self-Care Promotion: safe use of adaptive equipment encouraged  Intervention: Promote Injury-Free Environment  Flowsheets (Taken 3/3/2023 1122)  Safety Promotion/Fall Prevention: Fall Risk reviewed with patient/family

## 2023-05-08 ENCOUNTER — OFFICE VISIT (OUTPATIENT)
Dept: DERMATOLOGY | Facility: CLINIC | Age: 78
End: 2023-05-08
Payer: MEDICARE

## 2023-05-08 VITALS — WEIGHT: 110 LBS | BODY MASS INDEX: 20.77 KG/M2 | HEIGHT: 61 IN

## 2023-05-08 DIAGNOSIS — L81.4 SOLAR LENTIGO: ICD-10-CM

## 2023-05-08 DIAGNOSIS — L82.1 SEBORRHEIC KERATOSES: ICD-10-CM

## 2023-05-08 DIAGNOSIS — Z85.828 HISTORY OF NONMELANOMA SKIN CANCER: ICD-10-CM

## 2023-05-08 DIAGNOSIS — L57.0 ACTINIC KERATOSES: Primary | ICD-10-CM

## 2023-05-08 PROCEDURE — 17000 DESTRUCT PREMALG LESION: CPT | Mod: S$PBB,,, | Performed by: DERMATOLOGY

## 2023-05-08 PROCEDURE — 99213 OFFICE O/P EST LOW 20 MIN: CPT | Mod: 25,S$PBB,, | Performed by: DERMATOLOGY

## 2023-05-08 PROCEDURE — 17000 DESTRUCT PREMALG LESION: CPT | Mod: PBBFAC,PO | Performed by: DERMATOLOGY

## 2023-05-08 PROCEDURE — 99999 PR PBB SHADOW E&M-EST. PATIENT-LVL III: CPT | Mod: PBBFAC,,, | Performed by: DERMATOLOGY

## 2023-05-08 PROCEDURE — 99213 PR OFFICE/OUTPT VISIT, EST, LEVL III, 20-29 MIN: ICD-10-PCS | Mod: 25,S$PBB,, | Performed by: DERMATOLOGY

## 2023-05-08 PROCEDURE — 99213 OFFICE O/P EST LOW 20 MIN: CPT | Mod: PBBFAC,PO | Performed by: DERMATOLOGY

## 2023-05-08 PROCEDURE — 17003 DESTRUCT PREMALG LES 2-14: CPT | Mod: S$PBB,,, | Performed by: DERMATOLOGY

## 2023-05-08 PROCEDURE — 17003 DESTRUCT PREMALG LES 2-14: CPT | Mod: PBBFAC,PO | Performed by: DERMATOLOGY

## 2023-05-08 PROCEDURE — 17003 DESTRUCTION, PREMALIGNANT LESIONS; SECOND THROUGH 14 LESIONS: ICD-10-PCS | Mod: S$PBB,,, | Performed by: DERMATOLOGY

## 2023-05-08 PROCEDURE — 99999 PR PBB SHADOW E&M-EST. PATIENT-LVL III: ICD-10-PCS | Mod: PBBFAC,,, | Performed by: DERMATOLOGY

## 2023-05-08 PROCEDURE — 17000 PR DESTRUCTION(LASER SURGERY,CRYOSURGERY,CHEMOSURGERY),PREMALIGNANT LESIONS,FIRST LESION: ICD-10-PCS | Mod: S$PBB,,, | Performed by: DERMATOLOGY

## 2023-05-08 NOTE — PROGRESS NOTES
"  Subjective:      Patient ID:  Suki Macias is a 78 y.o. female who presents for   Chief Complaint   Patient presents with    Skin Check     TBSE     LOV 11/7/22 AK, Hx of NMSC, Scar, SK    Patient here for TBSE  Check bump in scalp.  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 , Disp: , Rfl:   ·  denosumab (PROLIA) 60 mg/mL Syrg, every 6 (six) months., Disp: , Rfl:   ·  fenofibric acid (FIBRICOR) 135 mg CpDR, TAKE 1 CAPSULE ONCE DAILY WITH EVENING MEAL FOR CHOLESTEROL, Disp: 90 capsule, Rfl: 3  ·  lisinopriL (PRINIVIL,ZESTRIL) 2.5 MG tablet, TAKE 1 TABLET BY MOUTH ONCE DAILY, Disp: 90 tablet, Rfl: 3  ·  omeprazole (PRILOSEC) 20 MG capsule, TAKE 1 CAPSULE ONCE DAILY FOR STOMACH, Disp: 90 capsule, Rfl: 3  ·  PREMARIN 0.9 mg Tab, TAKE 1 TABLET (0.9 MG TOTAL) BY MOUTH ONCE DAILY., Disp: 90 tablet, Rfl: 3  ·  SYNTHROID 88 mcg tablet, Take 1 tablet (88 mcg total) by mouth before breakfast., Disp: 90 tablet, Rfl: 3  ·  traMADoL (ULTRAM) 50 mg tablet, TAKE 1 TABLET BY MOUTH EVERY 8 HOURS AS NEEDED FOR PAIN THANK YOU!, Disp: 90 tablet, Rfl: 0      Review of Systems   Constitutional:  Negative for fever, chills, fatigue and malaise.   Respiratory:  Negative for cough and shortness of breath.    Gastrointestinal:  Negative for nausea and vomiting.   Skin:  Positive for activity-related " sunscreen use and wears hat. Negative for daily sunscreen use.   Hematologic/Lymphatic: Bruises/bleeds easily.     Objective:   Physical Exam   Constitutional: She appears well-developed and well-nourished. No distress.   Neurological: She is alert and oriented to person, place, and time. She is not disoriented.   Psychiatric: She has a normal mood and affect.   Skin:   Areas Examined (abnormalities noted in diagram):   Scalp / Hair Palpated and Inspected  Head / Face Inspection Performed  Neck Inspection Performed  Chest / Axilla Inspection Performed  Abdomen Inspection Performed  Genitals / Buttocks / Groin Inspection Performed  Back Inspection Performed  RUE Inspected  LUE Inspection Performed  RLE Inspected  LLE Inspection Performed  Nails and Digits Inspection Performed                       Diagram Legend     Erythematous scaling macule/papule c/w actinic keratosis       Vascular papule c/w angioma      Pigmented verrucoid papule/plaque c/w seborrheic keratosis      Yellow umbilicated papule c/w sebaceous hyperplasia      Irregularly shaped tan macule c/w lentigo     1-2 mm smooth white papules consistent with Milia      Movable subcutaneous cyst with punctum c/w epidermal inclusion cyst      Subcutaneous movable cyst c/w pilar cyst      Firm pink to brown papule c/w dermatofibroma      Pedunculated fleshy papule(s) c/w skin tag(s)      Evenly pigmented macule c/w junctional nevus     Mildly variegated pigmented, slightly irregular-bordered macule c/w mildly atypical nevus      Flesh colored to evenly pigmented papule c/w intradermal nevus       Pink pearly papule/plaque c/w basal cell carcinoma      Erythematous hyperkeratotic cursted plaque c/w SCC      Surgical scar with no sign of skin cancer recurrence      Open and closed comedones      Inflammatory papules and pustules      Verrucoid papule consistent consistent with wart     Erythematous eczematous patches and plaques     Dystrophic onycholytic nail  with subungual debris c/w onychomycosis     Umbilicated papule    Erythematous-base heme-crusted tan verrucoid plaque consistent with inflamed seborrheic keratosis     Erythematous Silvery Scaling Plaque c/w Psoriasis     See annotation      Assessment / Plan:        Actinic keratoses  Cryosurgery Procedure Note    Verbal consent from the patient is obtained and the patient is aware of the precancerous quality and need for treatment of these lesions. Liquid nitrogen cryosurgery is applied to the 2 actinic keratoses, as detailed in the physical exam, to produce a freeze injury. The patient is aware that blisters may form and is instructed on wound care with gentle cleansing and use of vaseline ointment to keep moist until healed. The patient is supplied a handout on cryosurgery and is instructed to call if lesions do not completely resolve.    Seborrheic keratoses  These are benign inherited growths without a malignant potential. Reassurance given to patient. No treatment is necessary.     History of nonmelanoma skin cancer  Area of previous SCCIS examined. Site well healed with no signs of recurrence.    Total body skin examination performed today including at least 12 points as noted in physical examination. No lesions suspicious for malignancy noted.    Solar lentigo  This is a benign hyperpigmented sun induced lesion. Daily sun protection will reduce the number of new lesions. Treatment of these benign lesions are considered cosmetic.    Patient instructed in importance in daily broad spectrum sun protection of at least spf 30. Mineral sunscreen ingredients preferred (Zinc +/- Titanium) and can be found OTC.   Recommend Elta MD for daily use on face and neck.  Patient encouraged to wear hat for all outdoor exposure.   Also discussed sun avoidance and use of protective clothing.             Follow up in about 1 year (around 5/8/2024).

## 2023-05-08 NOTE — PATIENT INSTRUCTIONS

## 2023-05-22 ENCOUNTER — OFFICE VISIT (OUTPATIENT)
Dept: FAMILY MEDICINE | Facility: CLINIC | Age: 78
End: 2023-05-22
Payer: MEDICARE

## 2023-05-22 ENCOUNTER — TELEPHONE (OUTPATIENT)
Dept: FAMILY MEDICINE | Facility: CLINIC | Age: 78
End: 2023-05-22
Payer: MEDICARE

## 2023-05-22 VITALS
WEIGHT: 109.19 LBS | BODY MASS INDEX: 20.62 KG/M2 | HEIGHT: 61 IN | HEART RATE: 71 BPM | RESPIRATION RATE: 18 BRPM | DIASTOLIC BLOOD PRESSURE: 79 MMHG | OXYGEN SATURATION: 98 % | SYSTOLIC BLOOD PRESSURE: 138 MMHG

## 2023-05-22 DIAGNOSIS — K57.92 DIVERTICULITIS: ICD-10-CM

## 2023-05-22 DIAGNOSIS — M15.9 PRIMARY OSTEOARTHRITIS INVOLVING MULTIPLE JOINTS: Primary | ICD-10-CM

## 2023-05-22 PROCEDURE — 99214 PR OFFICE/OUTPT VISIT, EST, LEVL IV, 30-39 MIN: ICD-10-PCS | Mod: S$PBB,,, | Performed by: FAMILY MEDICINE

## 2023-05-22 PROCEDURE — 99214 OFFICE O/P EST MOD 30 MIN: CPT | Mod: S$PBB,,, | Performed by: FAMILY MEDICINE

## 2023-05-22 PROCEDURE — 99999 PR PBB SHADOW E&M-EST. PATIENT-LVL III: CPT | Mod: PBBFAC,,, | Performed by: FAMILY MEDICINE

## 2023-05-22 PROCEDURE — 99999 PR PBB SHADOW E&M-EST. PATIENT-LVL III: ICD-10-PCS | Mod: PBBFAC,,, | Performed by: FAMILY MEDICINE

## 2023-05-22 PROCEDURE — 99213 OFFICE O/P EST LOW 20 MIN: CPT | Mod: PBBFAC,PN | Performed by: FAMILY MEDICINE

## 2023-05-22 RX ORDER — TRAMADOL HYDROCHLORIDE 50 MG/1
50 TABLET ORAL EVERY 8 HOURS PRN
Qty: 90 TABLET | Refills: 0 | Status: SHIPPED | OUTPATIENT
Start: 2023-05-22 | End: 2023-07-06 | Stop reason: SDUPTHER

## 2023-05-22 RX ORDER — TRAMADOL HYDROCHLORIDE 50 MG/1
TABLET ORAL
Qty: 90 TABLET | Refills: 0 | Status: CANCELLED | OUTPATIENT
Start: 2023-05-22

## 2023-05-22 RX ORDER — CIPROFLOXACIN 500 MG/1
500 TABLET ORAL 2 TIMES DAILY
Qty: 10 TABLET | Refills: 0 | Status: SHIPPED | OUTPATIENT
Start: 2023-05-22 | End: 2023-05-27

## 2023-05-22 RX ORDER — DIPHENOXYLATE HYDROCHLORIDE AND ATROPINE SULFATE 2.5; .025 MG/1; MG/1
1 TABLET ORAL 4 TIMES DAILY PRN
Qty: 40 TABLET | Refills: 0 | Status: SHIPPED | OUTPATIENT
Start: 2023-05-22 | End: 2023-06-01

## 2023-05-22 NOTE — TELEPHONE ENCOUNTER
----- Message from Celia Glover, Patient Care Assistant sent at 5/22/2023  8:38 AM CDT -----  Regarding: appointment  Contact: pt  Type:  Sooner Appointment Request    Caller is requesting a sooner appointment.  Caller declined first available appointment listed below.  Caller will not accept being placed on the waitlist and is requesting a message be sent to doctor.    Name of Caller:  pt     When is the first available appointment?  2023    Symptoms:  diarrhea    Best Call Back Number:  [phone] (home)     Additional Information:  please call pt to advise. Thanks!

## 2023-05-22 NOTE — PROGRESS NOTES
Subjective:       Patient ID: Suki Macias is a 78 y.o. female.    Chief Complaint: Diarrhea    Ms. Macias presents with c/o diverticulitis flare  Reports every two years she will have a flare that will not improve unless she gets antibiotics  + abdominal pain  + diarrhea  - hematochezia  - fever    Reports she always improves with cipro and lomotil    Also due for a refill on her tramadol    Review of Systems   Constitutional:  Negative for activity change, appetite change, fatigue and fever.   Respiratory:  Negative for shortness of breath.    Gastrointestinal:  Positive for abdominal pain and diarrhea.   Integumentary:  Negative for rash.       Objective:      Physical Exam  Vitals and nursing note reviewed.   Constitutional:       General: She is not in acute distress.     Appearance: She is not ill-appearing.   Cardiovascular:      Rate and Rhythm: Normal rate and regular rhythm.      Heart sounds: No murmur heard.  Pulmonary:      Effort: Pulmonary effort is normal.      Breath sounds: Normal breath sounds. No wheezing.   Skin:     General: Skin is warm and dry.      Findings: No rash.   Neurological:      Mental Status: She is alert.   Psychiatric:         Mood and Affect: Mood normal.         Behavior: Behavior normal.       Assessment:       1. Primary osteoarthritis involving multiple joints    2. Diverticulitis        Plan:       Problem List Items Addressed This Visit          Orthopedic    Primary osteoarthritis involving multiple joints - Primary    Relevant Medications    traMADoL (ULTRAM) 50 mg tablet     Other Visit Diagnoses       Diverticulitis        Relevant Medications    ciprofloxacin HCl (CIPRO) 500 MG tablet    diphenoxylate-atropine 2.5-0.025 mg (LOMOTIL) 2.5-0.025 mg per tablet

## 2023-06-07 ENCOUNTER — PATIENT MESSAGE (OUTPATIENT)
Dept: FAMILY MEDICINE | Facility: CLINIC | Age: 78
End: 2023-06-07
Payer: MEDICARE

## 2023-06-21 DIAGNOSIS — I10 ESSENTIAL HYPERTENSION: ICD-10-CM

## 2023-06-21 DIAGNOSIS — E78.2 HYPERCHOLESTEROLEMIA WITH HYPERTRIGLYCERIDEMIA: ICD-10-CM

## 2023-06-21 RX ORDER — FENOFIBRIC ACID 135 MG/1
CAPSULE, DELAYED RELEASE ORAL
Qty: 90 CAPSULE | Refills: 3 | Status: SHIPPED | OUTPATIENT
Start: 2023-06-21

## 2023-06-21 RX ORDER — LISINOPRIL 2.5 MG/1
2.5 TABLET ORAL DAILY
Qty: 90 TABLET | Refills: 3 | Status: SHIPPED | OUTPATIENT
Start: 2023-06-21 | End: 2023-12-15

## 2023-06-21 NOTE — TELEPHONE ENCOUNTER
----- Message from Khalif Briscoe sent at 6/21/2023  3:59 PM CDT -----  Contact: self  Type: RX Refill Request        Who Called: [facility]   Refill or New Rx: refill  RX Name and Strength: fenofibric acid (FIBRICOR) 135 mg CpDR  lisinopriL (PRINIVIL,ZESTRIL) 2.5 MG tablet  How is the patient currently taking it? (ex. 1XDay): as directed   Is this a 30 day or 90 day RX: n/a  Preferred Pharmacy with phone number:   [facility] - [place], [place] - [address]  [address] [place] [address]  Phone: [phone] Fax: [phone]  Local or Mail Order: local   Ordering Provider: Dr. Chaparro Priest Call Back Number: [phone]  Additional Information: Pharmacy has tried multiple times  to fax for medication no response pt needs medication she's completely out of medication for both of medications above. Thanks        HEARING EVALUATION    Name:  Michelle Llamas  :  1961  Age:  63 y o  Date of Evaluation: 22     History: Tinnitus  Reason for visit: Michelle Llamas is being seen today at the request of Dr Rose Lewis (ENT) for an evaluation of hearing  Juvenal Lawrence was accompanied to today's appointment by a friend who served as   Patient reports constant bilateral tinnitus ("crickets") and some imbalance (not vertigo) for the past 4 months  She also notes occasional right sided ear pain  She denies occupational noise exposure, but does state that she likes to listen to music through [address] [place] [address]  She report family history of hearing loss (sister wears hearing aids)  EVALUATION:    Otoscopic Evaluation:   Right Ear: Clear and healthy ear canal and tympanic membrane   Left Ear: Clear and healthy ear canal and tympanic membrane    Tympanometry:   Right: Type A - normal middle ear pressure and compliance   Left: Type A - normal middle ear pressure and compliance    Audiogram Results:  Essentially normal through 2k Hz sloping to mild/moderate high frequency sensorineural hearing loss, bilaterally  Note: 25 dB asymmetry noted at 3k Hz    *see attached audiogram      RECOMMENDATIONS:  Annual hearing eval, Return to [facility]  for F/U, Hearing Aid Evaluation, Medical Clearance and Copy to Patient/Caregiver    PATIENT EDUCATION:   Discussed results and recommendations with patient  Questions were addressed and the patient was encouraged to contact our department should concerns arise        Maria Elena Garsia   Clinical Audiologist

## 2023-06-21 NOTE — TELEPHONE ENCOUNTER
Care Due:                  Date            Visit Type   Department     Provider  --------------------------------------------------------------------------------    Last Visit: None Found      None         None Found  Next Visit: None Scheduled  None         None Found                                                            Last  Test          Frequency    Reason                     Performed    Due Date  --------------------------------------------------------------------------------    Office Visit  12 months..  SYNTHROID, lisinopriL....  Not Found    Overdue    [employer] Embedded Care Due Messages. Reference number: [id number].   6/21/2023 4:04:23 PM CDT

## 2023-07-06 ENCOUNTER — TELEPHONE (OUTPATIENT)
Dept: FAMILY MEDICINE | Facility: CLINIC | Age: 78
End: 2023-07-06
Payer: MEDICARE

## 2023-07-06 DIAGNOSIS — M15.9 PRIMARY OSTEOARTHRITIS INVOLVING MULTIPLE JOINTS: ICD-10-CM

## 2023-07-06 RX ORDER — TRAMADOL HYDROCHLORIDE 50 MG/1
50 TABLET ORAL EVERY 8 HOURS PRN
Qty: 90 TABLET | Refills: 0 | Status: SHIPPED | OUTPATIENT
Start: 2023-07-06 | End: 2023-08-10 | Stop reason: SDUPTHER

## 2023-07-06 NOTE — TELEPHONE ENCOUNTER
----- Message from Nicole Gooden sent at 7/6/2023 10:25 AM CDT -----  Contact: self  Type:  RX Refill Request    Who Called:  pt  Refill or New Rx:  refill  RX Name and Strength:  traMADoL (ULTRAM) 50 mg tablet      How is the patient currently taking it? (ex. 1XDay):  as directed  Is this a 30 day or 90 day RX:  90  Preferred Pharmacy with phone number:    [facility] - [place], [place] - [address]  [address] [place] [address]  Phone: [phone] Fax: [phone]   Local or Mail Order:  local  Ordering Provider:  jose bradley  Best Call Back Number:  [phone]   Additional Information:  pt would like a call back regarding this medication.please call

## 2023-08-11 ENCOUNTER — PATIENT MESSAGE (OUTPATIENT)
Dept: FAMILY MEDICINE | Facility: CLINIC | Age: 78
End: 2023-08-11
Payer: MEDICARE

## 2023-08-25 ENCOUNTER — TELEPHONE (OUTPATIENT)
Dept: FAMILY MEDICINE | Facility: CLINIC | Age: 78
End: 2023-08-25
Payer: MEDICARE

## 2023-08-25 NOTE — TELEPHONE ENCOUNTER
----- Message from Debbie Champ sent at 8/25/2023  7:54 AM CDT -----  Please review patient's Appointment Desk for an upcoming PROLIA INJECTION appointment.       The following are needed for this appointment.   Reminding to please contact patient, if needed:      ORDER.  Current / active in the Epic Therapy Plan, signed within a year.  LABS. Serum Calcium within 6 weeks of treatment.    DEXA SCAN within the last 2 years   DENTAL PRECAUTION CONFIRMED WITH PATIENT. No recent invasive dental procedures within the last month (tooth extraction, etc). A patient will need to bring a Dental Clearance signed by a dental provider if there was any recent dental procedure within the last month.   FOLLOW-UP APPOINTMENT within the last 12 months with the diagnosis mentioned in the visit notes.         Any item unavailable by the day prior to the scheduled appointment will cause the appointment to be CANCELLED.        To reschedule appointments, please contact [facility] INFUSION SCHEDULING POOL or call [phone] or [phone].       Thank you,  [facility], Infusion Department

## 2023-08-28 ENCOUNTER — LAB VISIT (OUTPATIENT)
Dept: LAB | Facility: HOSPITAL | Age: 78
End: 2023-08-28
Attending: FAMILY MEDICINE
Payer: MEDICARE

## 2023-08-28 DIAGNOSIS — M15.9 PRIMARY OSTEOARTHRITIS INVOLVING MULTIPLE JOINTS: ICD-10-CM

## 2023-08-28 LAB — CALCIUM SERPL-MCNC: 9.4 MG/DL (ref 8.7–10.5)

## 2023-08-28 PROCEDURE — 82310 ASSAY OF CALCIUM: CPT | Performed by: FAMILY MEDICINE

## 2023-08-28 PROCEDURE — 36415 COLL VENOUS BLD VENIPUNCTURE: CPT | Performed by: FAMILY MEDICINE

## 2023-09-08 ENCOUNTER — INFUSION (OUTPATIENT)
Dept: INFUSION THERAPY | Facility: HOSPITAL | Age: 78
End: 2023-09-08
Attending: INTERNAL MEDICINE
Payer: MEDICARE

## 2023-09-08 VITALS
TEMPERATURE: 98 F | OXYGEN SATURATION: 100 % | DIASTOLIC BLOOD PRESSURE: 78 MMHG | BODY MASS INDEX: 20.85 KG/M2 | SYSTOLIC BLOOD PRESSURE: 182 MMHG | WEIGHT: 106.19 LBS | HEIGHT: 60 IN | RESPIRATION RATE: 15 BRPM | HEART RATE: 59 BPM

## 2023-09-08 DIAGNOSIS — M81.0 AGE-RELATED OSTEOPOROSIS WITHOUT CURRENT PATHOLOGICAL FRACTURE: Primary | ICD-10-CM

## 2023-09-08 PROCEDURE — 63600175 PHARM REV CODE 636 W HCPCS: Mod: JZ,JG | Performed by: FAMILY MEDICINE

## 2023-09-08 PROCEDURE — 96372 THER/PROPH/DIAG INJ SC/IM: CPT

## 2023-09-08 RX ADMIN — DENOSUMAB 60 MG: 60 INJECTION SUBCUTANEOUS at 09:09

## 2023-09-08 NOTE — PLAN OF CARE
Problem: Fall Injury Risk  Goal: Absence of Fall and Fall-Related Injury  Outcome: Ongoing, Progressing  Intervention: Identify and Manage Contributors  Flowsheets (Taken 9/8/2023 0942)  Self-Care Promotion:   independence encouraged   BADL personal objects within reach   safe use of adaptive equipment encouraged  Medication Review/Management: medications reviewed  Intervention: Promote Injury-Free Environment  Flowsheets (Taken 9/8/2023 0942)  Safety Promotion/Fall Prevention: medications reviewed

## 2023-09-14 ENCOUNTER — OFFICE VISIT (OUTPATIENT)
Dept: FAMILY MEDICINE | Facility: CLINIC | Age: 78
End: 2023-09-14
Payer: MEDICARE

## 2023-09-14 VITALS
DIASTOLIC BLOOD PRESSURE: 54 MMHG | WEIGHT: 103.38 LBS | OXYGEN SATURATION: 98 % | HEART RATE: 74 BPM | RESPIRATION RATE: 16 BRPM | BODY MASS INDEX: 20.3 KG/M2 | HEIGHT: 60 IN | SYSTOLIC BLOOD PRESSURE: 108 MMHG

## 2023-09-14 DIAGNOSIS — E78.00 HYPERCHOLESTEROLEMIA: ICD-10-CM

## 2023-09-14 DIAGNOSIS — I70.0 AORTIC ATHEROSCLEROSIS: ICD-10-CM

## 2023-09-14 DIAGNOSIS — U07.1 COVID-19 VIRUS DETECTED: ICD-10-CM

## 2023-09-14 DIAGNOSIS — U07.1 COVID-19: ICD-10-CM

## 2023-09-14 DIAGNOSIS — E89.0 POSTABLATIVE HYPOTHYROIDISM: ICD-10-CM

## 2023-09-14 DIAGNOSIS — N18.31 CHRONIC KIDNEY DISEASE, STAGE 3A: ICD-10-CM

## 2023-09-14 DIAGNOSIS — J06.9 UPPER RESPIRATORY TRACT INFECTION, UNSPECIFIED TYPE: Primary | ICD-10-CM

## 2023-09-14 DIAGNOSIS — I10 PRIMARY HYPERTENSION: ICD-10-CM

## 2023-09-14 DIAGNOSIS — M15.9 PRIMARY OSTEOARTHRITIS INVOLVING MULTIPLE JOINTS: ICD-10-CM

## 2023-09-14 DIAGNOSIS — R73.03 PREDIABETES: ICD-10-CM

## 2023-09-14 LAB
CTP QC/QA: YES
CTP QC/QA: YES
POC MOLECULAR INFLUENZA A AGN: NEGATIVE
POC MOLECULAR INFLUENZA B AGN: NEGATIVE
SARS-COV-2 RDRP RESP QL NAA+PROBE: POSITIVE

## 2023-09-14 PROCEDURE — 99214 PR OFFICE/OUTPT VISIT, EST, LEVL IV, 30-39 MIN: ICD-10-PCS | Mod: S$PBB,,, | Performed by: FAMILY MEDICINE

## 2023-09-14 PROCEDURE — 99214 OFFICE O/P EST MOD 30 MIN: CPT | Mod: S$PBB,,, | Performed by: FAMILY MEDICINE

## 2023-09-14 PROCEDURE — 87502 INFLUENZA DNA AMP PROBE: CPT | Mod: PBBFAC,PN | Performed by: FAMILY MEDICINE

## 2023-09-14 PROCEDURE — 99999PBSHW POCT INFLUENZA A/B MOLECULAR: Mod: PBBFAC,,,

## 2023-09-14 PROCEDURE — 99999 PR PBB SHADOW E&M-EST. PATIENT-LVL III: CPT | Mod: PBBFAC,,, | Performed by: FAMILY MEDICINE

## 2023-09-14 PROCEDURE — 99999 PR PBB SHADOW E&M-EST. PATIENT-LVL III: ICD-10-PCS | Mod: PBBFAC,,, | Performed by: FAMILY MEDICINE

## 2023-09-14 PROCEDURE — 99999PBSHW: Mod: PBBFAC,,,

## 2023-09-14 PROCEDURE — 87635 SARS-COV-2 COVID-19 AMP PRB: CPT | Mod: PBBFAC,PN | Performed by: FAMILY MEDICINE

## 2023-09-14 PROCEDURE — 99999PBSHW POCT INFLUENZA A/B MOLECULAR: ICD-10-PCS | Mod: PBBFAC,,,

## 2023-09-14 PROCEDURE — 99213 OFFICE O/P EST LOW 20 MIN: CPT | Mod: PBBFAC,PN | Performed by: FAMILY MEDICINE

## 2023-09-14 RX ORDER — TRAMADOL HYDROCHLORIDE 50 MG/1
50 TABLET ORAL EVERY 8 HOURS PRN
Qty: 90 TABLET | Refills: 2 | Status: SHIPPED | OUTPATIENT
Start: 2023-09-14 | End: 2023-12-13

## 2023-09-14 NOTE — PROGRESS NOTES
Subjective:       Patient ID: Suki Macias is a 78 y.o. female.    Chief Complaint: Sore Throat, Nasal Congestion, Fatigue, and Dizziness    URI symptoms + significant and severe fatigue x 3 days. Kept this appt to be seen for chronic conditions.   Has not done home covid test.     Sore Throat   Pertinent negatives include no shortness of breath.   Fatigue  Associated symptoms include fatigue and a sore throat.     Review of Systems   Constitutional:  Positive for fatigue.   HENT:  Positive for sore throat.    Respiratory:  Negative for shortness of breath.    Neurological:  Positive for dizziness.         Objective:      Physical Exam  Vitals and nursing note reviewed.   Constitutional:       General: She is not in acute distress.     Appearance: She is ill-appearing.   HENT:      Nose: Congestion and rhinorrhea present.   Cardiovascular:      Rate and Rhythm: Normal rate and regular rhythm.      Heart sounds: No murmur heard.  Pulmonary:      Effort: Pulmonary effort is normal.      Breath sounds: Normal breath sounds. No wheezing.   Skin:     General: Skin is warm and dry.      Findings: No rash.   Neurological:      Mental Status: She is alert.   Psychiatric:         Mood and Affect: Mood normal.         Behavior: Behavior normal.         Assessment:       1. Upper respiratory tract infection, unspecified type    2. Primary osteoarthritis involving multiple joints    3. Primary hypertension    4. Hypercholesterolemia    5. Postablative hypothyroidism    6. Prediabetes    7. COVID-19    8. Aortic atherosclerosis    9. Chronic kidney disease, stage 3a        Plan:       Problem List Items Addressed This Visit          Cardiac/Vascular    Hypertension    Relevant Orders    CBC Auto Differential    Comprehensive Metabolic Panel    Hypercholesterolemia    Relevant Orders    Lipid Panel    Aortic atherosclerosis     Asymptomatic. Unable to take statins            Renal/    Chronic kidney disease, stage 3a      Repeat labs ordered. Paxlovid renally dosed.             Endocrine    Hypothyroid    Relevant Orders    TSH       Orthopedic    Primary osteoarthritis involving multiple joints    Relevant Medications    traMADoL (ULTRAM) 50 mg tablet     Other Visit Diagnoses       Upper respiratory tract infection, unspecified type    -  Primary    Relevant Orders    POCT COVID-19 Rapid Screening (Completed)    POCT Influenza A/B Molecular (Completed)    Prediabetes        Relevant Orders    Hemoglobin A1C    COVID-19        Relevant Medications    nirmatrelvir-ritonavir 150-100 mg DsPk

## 2023-09-15 ENCOUNTER — TELEPHONE (OUTPATIENT)
Dept: FAMILY MEDICINE | Facility: CLINIC | Age: 78
End: 2023-09-15
Payer: MEDICARE

## 2023-09-15 ENCOUNTER — NURSE TRIAGE (OUTPATIENT)
Dept: ADMINISTRATIVE | Facility: CLINIC | Age: 78
End: 2023-09-15
Payer: MEDICARE

## 2023-09-15 NOTE — TELEPHONE ENCOUNTER
----- Message from Marcie Brown sent at 9/15/2023  8:31 AM CDT -----  Contact: pt  Type: Needs Medical Advice    Who Called: pt    Best Call Back Number:[phone]    Additional Information: Requesting a call back regarding pharm told pt that the rx nirmatrelvir-ritonavir 150-100 mg DsPk was the incorrect medication and would not give rx to pt.  Pharm said the rx is for pt who only have kidney failure.     Pt needing correct covid rx to be called in please     Pt also said there are issues with the summery from her visit and needs office to call to get chart corrected.     [id number] - [place], [place] - [address]  [address] [place] [address]  Phone: [phone] Fax: [phone]        Please Advise- Thank you

## 2023-09-15 NOTE — TELEPHONE ENCOUNTER
----- Message from Debbie Hodge sent at 9/15/2023  1:16 PM CDT -----  Contact: self  Type:  Needs Medical Advice    Who Called: self    Would the patient rather a call back or a response via [facility]? call  Best Call Back Number: [phone] (home)     Additional Information: pt would like nurse to give her a call asap concerning her covid medications. Pt sts no one has called back yet. Phar wouldn't give her the medication so she needs an alternative medication she can take for covid. Pt is really frustrated. Please advise and thank you.

## 2023-09-15 NOTE — TELEPHONE ENCOUNTER
pharm told pt that the rx nirmatrelvir-ritonavir 150-100 mg DsPk was the incorrect medication and would not give rx to pt.  Pharm said the rx is for pt who only have kidney failure

## 2023-09-15 NOTE — TELEPHONE ENCOUNTER
Spoke with Lay at [facility] she is filling the RX now. Also, spoke to Mrs. Cohn. She is upset because the pharmacy told her she was in kidney failure. I attempted to regain patient relations an she felt more at ease upon the end of our call.

## 2023-09-15 NOTE — TELEPHONE ENCOUNTER
She has chronic kidney disease with a GFR 46 per our chart. She has had this for at least 3 years. This is the correct medication. Please let patient AND pharmacy know so they can treat her covid.

## 2023-09-15 NOTE — TELEPHONE ENCOUNTER
"Pt informed message to MD. Awaiting response.  pharm told pt that the rx nirmatrelvir-ritonavir 150-100 mg DsPk was the incorrect medication and would not give rx to pt.  Pharm said the rx is for pt who only have kidney failure    Pt states, " I have never been told I have Kidney failure"      Pt voiced understanding       "

## 2023-09-15 NOTE — TELEPHONE ENCOUNTER
Call x 2 no answer.    Reason for Disposition   Second attempt to contact caller AND no contact made. Phone number verified.    Protocols used: No Contact or Duplicate Contact Call-A-OH

## 2023-09-17 ENCOUNTER — NURSE TRIAGE (OUTPATIENT)
Dept: ADMINISTRATIVE | Facility: CLINIC | Age: 78
End: 2023-09-17
Payer: MEDICARE

## 2023-09-18 NOTE — TELEPHONE ENCOUNTER
If does not tolerate- just continue supportive care for Covid. Stay hydrated. Rest. OTC cold and flu medications as tolerated.

## 2023-10-15 ENCOUNTER — HOSPITAL ENCOUNTER (EMERGENCY)
Facility: HOSPITAL | Age: 78
Discharge: HOME | End: 2023-10-15
Attending: EMERGENCY MEDICINE
Payer: MEDICARE

## 2023-10-15 VITALS
BODY MASS INDEX: 20.62 KG/M2 | HEART RATE: 70 BPM | WEIGHT: 105 LBS | SYSTOLIC BLOOD PRESSURE: 150 MMHG | DIASTOLIC BLOOD PRESSURE: 60 MMHG | RESPIRATION RATE: 20 BRPM | TEMPERATURE: 98 F | OXYGEN SATURATION: 100 % | HEIGHT: 60 IN

## 2023-10-15 DIAGNOSIS — R06.00 DYSPNEA, UNSPECIFIED TYPE: Primary | ICD-10-CM

## 2023-10-15 DIAGNOSIS — R06.02 SHORTNESS OF BREATH: ICD-10-CM

## 2023-10-15 DIAGNOSIS — F41.9 ANXIETY: ICD-10-CM

## 2023-10-15 LAB
ALBUMIN SERPL BCP-MCNC: 4.1 G/DL (ref 3.5–5.2)
ALP SERPL-CCNC: 62 U/L (ref 55–135)
ALT SERPL W/O P-5'-P-CCNC: 11 U/L (ref 10–44)
ANION GAP SERPL CALC-SCNC: 21 MMOL/L (ref 8–16)
AST SERPL-CCNC: 19 U/L (ref 10–40)
BASOPHILS NFR BLD: 0 % (ref 0–1.9)
BILIRUB SERPL-MCNC: 0.6 MG/DL (ref 0.1–1)
BNP SERPL-MCNC: 87 PG/ML (ref 0–99)
BUN SERPL-MCNC: 16 MG/DL (ref 8–23)
CALCIUM SERPL-MCNC: 9.6 MG/DL (ref 8.7–10.5)
CHLORIDE SERPL-SCNC: 101 MMOL/L (ref 95–110)
CO2 SERPL-SCNC: 17 MMOL/L (ref 23–29)
CREAT SERPL-MCNC: 0.9 MG/DL (ref 0.5–1.4)
DIFFERENTIAL METHOD: ABNORMAL
EOSINOPHIL NFR BLD: 3 % (ref 0–8)
ERYTHROCYTE [DISTWIDTH] IN BLOOD BY AUTOMATED COUNT: 15.1 % (ref 11.5–14.5)
EST. GFR  (NO RACE VARIABLE): >60 ML/MIN/1.73 M^2
GLUCOSE SERPL-MCNC: 104 MG/DL (ref 70–110)
HCT VFR BLD AUTO: 41.8 % (ref 37–48.5)
HCV AB SERPL QL IA: NORMAL
HGB BLD-MCNC: 13.7 G/DL (ref 12–16)
HIV 1+2 AB+HIV1 P24 AG SERPL QL IA: NORMAL
IMM GRANULOCYTES # BLD AUTO: ABNORMAL K/UL
IMM GRANULOCYTES NFR BLD AUTO: ABNORMAL %
INFLUENZA A, MOLECULAR: NEGATIVE
INFLUENZA B, MOLECULAR: NEGATIVE
LYMPHOCYTES NFR BLD: 25 % (ref 18–48)
MCH RBC QN AUTO: 29.7 PG (ref 27–31)
MCHC RBC AUTO-ENTMCNC: 32.8 G/DL (ref 32–36)
MCV RBC AUTO: 91 FL (ref 82–98)
MONOCYTES NFR BLD: 20 % (ref 4–15)
NEUTROPHILS NFR BLD: 52 % (ref 38–73)
NRBC BLD-RTO: ABNORMAL /100 WBC
PLATELET # BLD AUTO: 294 K/UL (ref 150–450)
PLATELET BLD QL SMEAR: ABNORMAL
PMV BLD AUTO: 11.3 FL (ref 9.2–12.9)
POTASSIUM SERPL-SCNC: 4.3 MMOL/L (ref 3.5–5.1)
PROT SERPL-MCNC: 7.3 G/DL (ref 6–8.4)
RBC # BLD AUTO: 4.61 M/UL (ref 4–5.4)
SODIUM SERPL-SCNC: 139 MMOL/L (ref 136–145)
SPECIMEN SOURCE: NORMAL
TROPONIN I SERPL DL<=0.01 NG/ML-MCNC: <0.006 NG/ML (ref 0–0.03)
WBC # BLD AUTO: 8 K/UL (ref 3.9–12.7)

## 2023-10-15 PROCEDURE — 99285 EMERGENCY DEPT VISIT HI MDM: CPT | Mod: 25

## 2023-10-15 PROCEDURE — 71045 X-RAY EXAM CHEST 1 VIEW: CPT | Mod: 26,,, | Performed by: RADIOLOGY

## 2023-10-15 PROCEDURE — 86803 HEPATITIS C AB TEST: CPT | Performed by: EMERGENCY MEDICINE

## 2023-10-15 PROCEDURE — 83880 ASSAY OF NATRIURETIC PEPTIDE: CPT | Performed by: EMERGENCY MEDICINE

## 2023-10-15 PROCEDURE — 84484 ASSAY OF TROPONIN QUANT: CPT | Performed by: EMERGENCY MEDICINE

## 2023-10-15 PROCEDURE — 71045 XR CHEST 1 VIEW: ICD-10-PCS | Mod: 26,,, | Performed by: RADIOLOGY

## 2023-10-15 PROCEDURE — 96374 THER/PROPH/DIAG INJ IV PUSH: CPT

## 2023-10-15 PROCEDURE — 80053 COMPREHEN METABOLIC PANEL: CPT | Performed by: EMERGENCY MEDICINE

## 2023-10-15 PROCEDURE — 71045 X-RAY EXAM CHEST 1 VIEW: CPT | Mod: TC

## 2023-10-15 PROCEDURE — 63600175 PHARM REV CODE 636 W HCPCS: Performed by: EMERGENCY MEDICINE

## 2023-10-15 PROCEDURE — 93005 ELECTROCARDIOGRAM TRACING: CPT

## 2023-10-15 PROCEDURE — 93010 ELECTROCARDIOGRAM REPORT: CPT | Mod: ,,, | Performed by: INTERNAL MEDICINE

## 2023-10-15 PROCEDURE — 87389 HIV-1 AG W/HIV-1&-2 AB AG IA: CPT | Performed by: EMERGENCY MEDICINE

## 2023-10-15 PROCEDURE — 93010 EKG 12-LEAD: ICD-10-PCS | Mod: ,,, | Performed by: INTERNAL MEDICINE

## 2023-10-15 PROCEDURE — 87502 INFLUENZA DNA AMP PROBE: CPT | Performed by: EMERGENCY MEDICINE

## 2023-10-15 RX ORDER — LORAZEPAM 2 MG/ML
0.25 INJECTION INTRAMUSCULAR
Status: COMPLETED | OUTPATIENT
Start: 2023-10-15 | End: 2023-10-15

## 2023-10-15 RX ORDER — LORAZEPAM 0.5 MG/1
0.5 TABLET ORAL EVERY 8 HOURS PRN
Qty: 15 TABLET | Refills: 0 | Status: SHIPPED | OUTPATIENT
Start: 2023-10-15 | End: 2023-12-15 | Stop reason: SDUPTHER

## 2023-10-15 RX ADMIN — LORAZEPAM 0.25 MG: 2 INJECTION INTRAMUSCULAR; INTRAVENOUS at 01:10

## 2023-10-15 NOTE — DISCHARGE INSTRUCTIONS
Continue previously prescribed medications and treatments, and take Ativan as needed for feelings of anxiety.  Follow-up with your primary care doctor tomorrow.  Return here as needed or if worse in any way.

## 2023-10-15 NOTE — ED PROVIDER NOTES
Encounter Date: 10/15/2023       History     Chief Complaint   Patient presents with    Shortness of Breath     Ems states Patient complaining of SOB, went to urgent care first but was sent away.  SP02 97%, EtCO2 16, 40 respirations.       78-year-old female here from home via EMS for evaluation and treatment of shortness of breath.   Patient states symptoms started yesterday.  History of recent COVID-19 infection, about 2 weeks ago, but patient states that she was over the symptoms from COVID when the current symptoms started.  She states she feels as if she has mucus in the back of her throat which prevents her from breathing.  She is very anxious.   Per EMS, the patient went to an urgent care clinic 1st, but she was told that they could not help her there and she was told to go to the nearest emergency department.  Patient's O2 saturations on room air are 97%.  Afebrile.      Review of patient's allergies indicates:   Allergen Reactions    Codeine Other (See Comments)     Hallucinations    Crestor [rosuvastatin]     Doxycycline Diarrhea    Lipitor [atorvastatin]     Statins-hmg-coa reductase inhibitors      Past Medical History:   Diagnosis Date    Arthritis     Chronic diarrhea     Diverticulitis 4/23/2021    Hypertension     Hypothyroid     Hypothyroidism, postablative     Squamous cell carcinoma 01/2018    SCCIS anterior scalp, imiquimod     Past Surgical History:   Procedure Laterality Date    BACK SURGERY  1973    BLADDER SUSPENSION  1987    BREAST BIOPSY      COLONOSCOPY  2011    procedure aborted. unable to complete    COLONOSCOPY N/A 5/21/2019    Procedure: sigmoid flex;  Surgeon: Ishaan Mayfield MD;  Location: [facility];  Service: Endoscopy;  Laterality: N/A;    HYSTERECTOMY  1985    bso    OOPHORECTOMY      SPINE SURGERY       Family History   Problem Relation Age of Onset    Colon cancer Mother     Thyroid disease Mother     Cancer Mother     Diabetes Mellitus Mother     Kidney disease Mother      Hypertension Mother     Heart disease Mother     Lung cancer Father     Rheum arthritis Father     Cancer Father     Breast cancer Sister     Hypertension Sister     Hyperlipidemia Sister     Lung cancer Brother     Heart failure Brother     Stroke Brother     Cancer Brother     COPD Brother     Heart disease Brother     Hyperlipidemia Brother     Breast cancer Sister     Osteoarthritis Sister     Hypertension Sister     Hyperlipidemia Sister     Rheum arthritis Daughter     Diabetes Mellitus Maternal Aunt     Rheum arthritis Paternal Aunt     Melanoma Neg Hx     Psoriasis Neg Hx     Lupus Neg Hx     Eczema Neg Hx     Inflammatory bowel disease Neg Hx     Chronic back pain Neg Hx     Asthma Neg Hx     Alcohol abuse Neg Hx      Social History     Tobacco Use    Smoking status: Former     Current packs/day: 0.00     Types: Cigarettes     Quit date: 1970     Years since quittin.8    Smokeless tobacco: Former   Substance Use Topics    Alcohol use: Yes     Alcohol/week: 0.0 standard drinks of alcohol     Comment: RARELY    Drug use: No     Review of Systems   Constitutional: Negative.    HENT:  Negative for congestion, rhinorrhea and sore throat.         Sinus congestion or rhinorrhea.  No sore throat, the state that she feels as if she has mucus in the back of her throat which restricts her breathing.   Eyes: Negative.    Respiratory:  Positive for shortness of breath.    Cardiovascular: Negative.    Gastrointestinal: Negative.    Endocrine: Negative.    Genitourinary: Negative.    Musculoskeletal: Negative.    Skin: Negative.    Allergic/Immunologic: Negative.    Neurological: Negative.    Hematological: Negative.    Psychiatric/Behavioral:  The patient is nervous/anxious.        Physical Exam     Initial Vitals [10/15/23 1247]   BP Pulse Resp Temp SpO2   (!) 182/82 82 (!) 28 97.8 °F (36.6 °C) 100 %      MAP       --         Physical Exam    Nursing note and vitals reviewed.  Constitutional: She appears  well-developed and well-nourished. She is not diaphoretic.   HENT:   Head: Normocephalic and atraumatic.   Nose: Nose normal.   Mouth/Throat: Oropharynx is clear and moist. No oropharyngeal exudate.   Eyes: Conjunctivae and EOM are normal. Pupils are equal, round, and reactive to light. No scleral icterus.   Neck: Neck supple. No thyromegaly present. No tracheal deviation present. No JVD present.   Normal range of motion.  Cardiovascular:  Normal rate, regular rhythm, normal heart sounds and intact distal pulses.           No murmur heard.  Pulmonary/Chest: Breath sounds normal. No stridor. No respiratory distress. She has no wheezes. She has no rhonchi. She has no rales.   Abdominal: Abdomen is soft. Bowel sounds are normal. She exhibits no distension. There is no abdominal tenderness.   Musculoskeletal:         General: No tenderness or edema. Normal range of motion.      Cervical back: Normal range of motion and neck supple.     Lymphadenopathy:     She has no cervical adenopathy.   Neurological: She is alert and oriented to person, place, and time. She has normal strength. No cranial nerve deficit or sensory deficit. GCS score is 15. GCS eye subscore is 4. GCS verbal subscore is 5. GCS motor subscore is 6.   Skin: Skin is warm and dry. Capillary refill takes less than 2 seconds. No rash noted. No erythema.   Psychiatric:   Very anxious.         ED Course   Procedures  Labs Reviewed   CBC W/ AUTO DIFFERENTIAL - Abnormal; Notable for the following components:       Result Value    RDW 15.1 (*)     Mono % 20.0 (*)     All other components within normal limits   COMPREHENSIVE METABOLIC PANEL - Abnormal; Notable for the following components:    CO2 17 (*)     Anion Gap 21 (*)     All other components within normal limits   INFLUENZA A & B BY MOLECULAR   TROPONIN I   B-TYPE NATRIURETIC PEPTIDE   HIV 1 / 2 ANTIBODY   HEPATITIS C ANTIBODY     EKG Readings: (Independently Interpreted)    EKG personally reviewed by me  shows normal sinus rhythm, slightly prolonged QT. 75 beats per minute, CA interval 152, .  No ST elevations or depressions, no T-wave changes, no arrhythmia.       Imaging Results              X-Ray Chest 1 View (Final result)  Result time 10/15/23 13:27:38      Final result by Jessica Patel MD (10/15/23 13:27:38)                   Impression:      Deep inflation may indicate COPD.  No acute abnormality.      Electronically signed by: Jessica Patel  Date:    10/15/2023  Time:    13:27               Narrative:    EXAMINATION:  XR CHEST 1 VIEW    CLINICAL HISTORY:  shortness of breath;    TECHNIQUE:  Single frontal view of the chest was performed.    COMPARISON:  None    FINDINGS:  The cardiomediastinal silhouette is within normal limits. The lungs are deeply inflated and clear. There is no pleural effusion or pneumothorax. Bones are intact.                                    X-Rays:   Independently Interpreted Readings:   Other Readings:    Me shows clear lungs, possible hyperinflation, normal cardiac silhouette, normal skeletal structures    Medications   LORazepam injection 0.25 mg (0.25 mg Intravenous Given 10/15/23 1324)     Medical Decision Making   Differential includes pneumonia, pneumothorax, COPD, asthma, myocardial  ischemia or infarction, viral upper respiratory infection, influenza, pharyngeal edema or abscess, etc.     Patient's labs are normal.  Chest x-ray clear, EKG unremarkable.  Patient was very anxious and hyperventilating initially.  After being given a very small dose of Ativan, the patient is now relaxed, breathing easily, and O2 saturations are normal at  %.  Had a long discussion with the patient and her  about the fact that even though her COVID symptoms have improved significantly, she will probably still produce excess mucus the next several days.  I also told the patient that she does not necessarily need to cough it all up.  Advised to drink more water to  help loosen secretions.  She will follow-up with her PCP within the next few days, and return here as needed or if worse in any way.    Amount and/or Complexity of Data Reviewed  Labs: ordered.  Radiology: ordered.    Risk  Prescription drug management.                               Clinical Impression:   Final diagnoses:  [R06.02] Shortness of breath  [R06.00] Dyspnea, unspecified type (Primary)  [F41.9] Anxiety        ED Disposition Condition    Discharge Stable          ED Prescriptions       Medication Sig Dispense Start Date End Date Auth. Provider    LORazepam (ATIVAN) 0.5 MG tablet Take 1 tablet (0.5 mg total) by mouth every 8 (eight) hours as needed for Anxiety. 15 tablet 10/15/2023 10/22/2023 Leif Fleming MD          Follow-up Information       Follow up With Specialties Details Why Contact Info    Mandy Renee MD Family Medicine Call in 1 day  [address]  [address]  [place] [id number] [phone]      [facility] Emergency Dept Emergency Medicine  As needed, If symptoms worsen [address] [address] [phone]             Leif Fleming MD  10/15/23 1921

## 2023-10-16 ENCOUNTER — TELEPHONE (OUTPATIENT)
Dept: FAMILY MEDICINE | Facility: CLINIC | Age: 78
End: 2023-10-16
Payer: MEDICARE

## 2023-10-16 ENCOUNTER — OFFICE VISIT (OUTPATIENT)
Dept: FAMILY MEDICINE | Facility: CLINIC | Age: 78
End: 2023-10-16
Payer: MEDICARE

## 2023-10-16 VITALS
SYSTOLIC BLOOD PRESSURE: 145 MMHG | DIASTOLIC BLOOD PRESSURE: 60 MMHG | BODY MASS INDEX: 20.62 KG/M2 | HEIGHT: 60 IN | HEART RATE: 75 BPM | WEIGHT: 105 LBS | RESPIRATION RATE: 18 BRPM | OXYGEN SATURATION: 95 %

## 2023-10-16 DIAGNOSIS — J01.40 ACUTE NON-RECURRENT PANSINUSITIS: Primary | ICD-10-CM

## 2023-10-16 PROCEDURE — 99214 OFFICE O/P EST MOD 30 MIN: CPT | Mod: PBBFAC,PN

## 2023-10-16 PROCEDURE — 99999 PR PBB SHADOW E&M-EST. PATIENT-LVL IV: ICD-10-PCS | Mod: PBBFAC,,,

## 2023-10-16 PROCEDURE — 99214 OFFICE O/P EST MOD 30 MIN: CPT | Mod: S$PBB,,,

## 2023-10-16 PROCEDURE — 99999 PR PBB SHADOW E&M-EST. PATIENT-LVL IV: CPT | Mod: PBBFAC,,,

## 2023-10-16 PROCEDURE — 99214 PR OFFICE/OUTPT VISIT, EST, LEVL IV, 30-39 MIN: ICD-10-PCS | Mod: S$PBB,,,

## 2023-10-16 RX ORDER — CHLORPHENIRAMINE MALEATE 4 MG
4 TABLET ORAL EVERY 6 HOURS PRN
Qty: 20 TABLET | Refills: 0 | Status: SHIPPED | OUTPATIENT
Start: 2023-10-16

## 2023-10-16 RX ORDER — GUAIFENESIN 600 MG/1
600 TABLET, EXTENDED RELEASE ORAL 2 TIMES DAILY
Qty: 20 TABLET | Refills: 0 | Status: SHIPPED | OUTPATIENT
Start: 2023-10-16 | End: 2023-10-26

## 2023-10-16 RX ORDER — AZITHROMYCIN 250 MG/1
TABLET, FILM COATED ORAL
Qty: 6 TABLET | Refills: 0 | Status: SHIPPED | OUTPATIENT
Start: 2023-10-16 | End: 2023-10-21

## 2023-10-16 NOTE — PROGRESS NOTES
Subjective:       Patient ID: Suki Macias is a 78 y.o. female.    Chief Complaint: No chief complaint on file.    Patient presents to the clinic with complaint of post nasal drip and feeling as though she cannot breath due to nasal congestion and mucus in her throat.     She was seen in the ER yesterday and sent home with Ativan.  She has a chest x-ray that was negative for any acute findings.     States symptoms started 1 week ago with post nasal drip. She states she has mucus in her throat that she cannot cough up. States cough is productive with yellow sputum. She has been using Flonase and using saline nasal flushes. She has been using Robitussin DM with mild relief. She is very anxious that she cannot breath in and out of her nose.     She is able to swallow without difficulty. Her spouse is present and just gave her an Ativan.     Patient educated on plan of care, verbalized understanding.                Review of Systems   Constitutional:  Negative for activity change, appetite change, chills, diaphoresis and fever.   HENT:  Positive for congestion, postnasal drip and sinus pressure. Negative for ear pain, sneezing and sore throat.    Eyes:  Negative for pain, discharge, redness and itching.   Respiratory:  Negative for apnea, cough, chest tightness, shortness of breath and wheezing.    Cardiovascular:  Negative for chest pain and leg swelling.   Gastrointestinal:  Negative for abdominal distention, abdominal pain, constipation, diarrhea, nausea and vomiting.   Genitourinary:  Negative for difficulty urinating, dysuria, flank pain and frequency.   Skin:  Negative for color change, rash and wound.   Neurological:  Negative for dizziness.   All other systems reviewed and are negative.      Patient Active Problem List   Diagnosis    Hypertension    Hypothyroid    Primary osteoarthritis involving multiple joints    Hypercholesterolemia    Arthritis    Encounter for long-term (current) use of medications     Gastroesophageal reflux disease without esophagitis    Diverticulosis of large intestine without hemorrhage    Hemorrhoids    Grade I hemorrhoids    Hyperglycemia    Hiatal hernia    Hyperparathyroidism    Age-related osteoporosis without current pathological fracture    Aortic atherosclerosis    Chronic kidney disease, stage 3a       Objective:      Physical Exam  Vitals and nursing note reviewed.   Constitutional:       General: She is not in acute distress.     Appearance: Normal appearance. She is well-developed.   HENT:      Head: Normocephalic.      Nose: Nose normal.      Right Turbinates: Swollen.      Left Turbinates: Swollen.      Mouth/Throat:      Pharynx: No pharyngeal swelling, oropharyngeal exudate, posterior oropharyngeal erythema or uvula swelling.      Tonsils: No tonsillar exudate or tonsillar abscesses.   Eyes:      Conjunctiva/sclera: Conjunctivae normal.      Pupils: Pupils are equal, round, and reactive to light.   Cardiovascular:      Rate and Rhythm: Normal rate and regular rhythm.      Heart sounds: Normal heart sounds.   Pulmonary:      Effort: Pulmonary effort is normal. No respiratory distress.      Breath sounds: Normal breath sounds.   Musculoskeletal:      Cervical back: Normal range of motion and neck supple.   Skin:     General: Skin is warm and dry.      Findings: No rash.   Neurological:      Mental Status: She is alert and oriented to person, place, and time.   Psychiatric:         Mood and Affect: Mood is anxious.         Behavior: Behavior normal.         Lab Results   Component Value Date    WBC 8.00 10/15/2023    HGB 13.7 10/15/2023    HCT 41.8 10/15/2023     10/15/2023    CHOL 259 (H) 02/27/2023    TRIG 113 02/27/2023    HDL 79 (H) 02/27/2023    ALT 11 10/15/2023    AST 19 10/15/2023     10/15/2023    K 4.3 10/15/2023     10/15/2023    CREATININE 0.9 10/15/2023    BUN 16 10/15/2023    CO2 17 (L) 10/15/2023    TSH 2.600 02/27/2023    GLUF 96 05/31/2019     HGBA1C 5.8 (H) 02/27/2023     The 10-year ASCVD risk score (Ashwin GUNN, et al., 2019) is: 39.5%    Values used to calculate the score:      Age: 78 years      Sex: Female      Is Non- : No      Diabetic: No      Tobacco smoker: No      Systolic Blood Pressure: 145 mmHg      Is BP treated: Yes      HDL Cholesterol: 79 mg/dL      Total Cholesterol: 259 mg/dL  Visit Vitals  BP (!) 145/60   Pulse 75   Resp 18   Ht 5' (1.524 m)   Wt 47.6 kg (105 lb)   SpO2 95%   BMI 20.51 kg/m²      Assessment:       1. Acute non-recurrent pansinusitis        Plan:       1. Acute non-recurrent pansinusitis  -     azithromycin (Z-MARILIN) 250 MG tablet; Take 2 tablets by mouth on day 1; Take 1 tablet by mouth on days 2-5  Dispense: 6 tablet; Refill: 0  -     chlorpheniramine (CHLOR-TRIMETON) 4 mg tablet; Take 1 tablet (4 mg total) by mouth every 6 (six) hours as needed for Allergies.  Dispense: 20 tablet; Refill: 0  -     guaiFENesin (MUCINEX) 600 mg 12 hr tablet; Take 1 tablet (600 mg total) by mouth 2 (two) times daily. for 10 days  Dispense: 20 tablet; Refill: 0   If no improvement in symptoms will order Prednisone- will try to hold off on this due to osteoporosis   - Continue Flonase and Saline nasal flushes   - Continue Ativan PRN for anxiety   - The diagnosis, treatment plan, instructions for follow-up and reevaluation as well as ED precautions were discussed and understanding was verbalized. All questions or concerns have been addressed.        Follow up if symptoms worsen or fail to improve.      Future Appointments       Date Provider Specialty Appt Notes    12/15/2023 Mandy Renee MD Family Medicine 3MO FO/UP VIRTUAL    2/5/2024  Radiology .    2/29/2024  Lab FASTING LAB    3/15/2024 Mandy Renee MD Family Medicine 3MO FO/UP

## 2023-10-16 NOTE — TELEPHONE ENCOUNTER
----- Message from Lisandra Lozano sent at 10/16/2023  8:25 AM CDT -----  Contact: Self  Type:  Sooner Appointment Request    Caller is requesting a sooner appointment.  Caller declined first available appointment listed below.  Caller will not accept being placed on the waitlist and is requesting a message be sent to doctor.    Name of Caller:  Pt   When is the first available appointment?  NA  Symptoms:  Shortness of breath/HFU   Would the patient rather a call back or a response via [facility]? Call   Best Call Back Number:  [phone]    Additional Information:  Please call to schedule.

## 2023-10-16 NOTE — PATIENT INSTRUCTIONS

## 2023-11-14 ENCOUNTER — OFFICE VISIT (OUTPATIENT)
Dept: OTOLARYNGOLOGY | Facility: CLINIC | Age: 78
End: 2023-11-14
Payer: MEDICARE

## 2023-11-14 VITALS — BODY MASS INDEX: 20.75 KG/M2 | WEIGHT: 105.69 LBS | HEIGHT: 60 IN

## 2023-11-14 DIAGNOSIS — R09.81 NASAL CONGESTION: ICD-10-CM

## 2023-11-14 DIAGNOSIS — R09.82 POSTNASAL DRIP: ICD-10-CM

## 2023-11-14 DIAGNOSIS — R09.89 THROAT CLEARING: Primary | ICD-10-CM

## 2023-11-14 PROCEDURE — 99999 PR PBB SHADOW E&M-EST. PATIENT-LVL III: CPT | Mod: PBBFAC,,, | Performed by: OTOLARYNGOLOGY

## 2023-11-14 PROCEDURE — 99213 OFFICE O/P EST LOW 20 MIN: CPT | Mod: PBBFAC,PN | Performed by: OTOLARYNGOLOGY

## 2023-11-14 PROCEDURE — 99999 PR PBB SHADOW E&M-EST. PATIENT-LVL III: ICD-10-PCS | Mod: PBBFAC,,, | Performed by: OTOLARYNGOLOGY

## 2023-11-14 PROCEDURE — 99203 OFFICE O/P NEW LOW 30 MIN: CPT | Mod: S$PBB,,, | Performed by: OTOLARYNGOLOGY

## 2023-11-14 PROCEDURE — 99203 PR OFFICE/OUTPT VISIT, NEW, LEVL III, 30-44 MIN: ICD-10-PCS | Mod: S$PBB,,, | Performed by: OTOLARYNGOLOGY

## 2023-11-14 RX ORDER — FLUTICASONE PROPIONATE 50 MCG
2 SPRAY, SUSPENSION (ML) NASAL NIGHTLY
Qty: 15.8 ML | Refills: 11 | Status: SHIPPED | OUTPATIENT
Start: 2023-11-14 | End: 2023-12-13

## 2023-11-14 RX ORDER — OMEPRAZOLE 40 MG/1
40 CAPSULE, DELAYED RELEASE ORAL
Qty: 60 CAPSULE | Refills: 3 | Status: SHIPPED | OUTPATIENT
Start: 2023-11-14 | End: 2024-02-27

## 2023-11-14 NOTE — PROGRESS NOTES
Subjective:       Patient ID: Suki Macias is a 78 y.o. female.    Chief Complaint: Other (Pt c/o post nasal drip and her sinuses blocks up )        This patient tells me she had a fairly severe case of COVID in the end of September beginning of October unless it at least three weeks and since that time she is had a feeling of mucus in her throat and persistent throat clearing that is just driving her crazy.  She is also had a little bit of nasal congestion one side of the other might be a bit congested.  She is on a small dose of lisinopril          Objective:      ENT Physical Exam  Constitutional  Appearance: patient appears well-developed, well-nourished and well-groomed,  Communication/Voice: communication appropriate for developmental age; vocal quality normal;  Constitutional comments:   She is clearing her throat persistently throughout the visit  Head and Face  Appearance: head appears normal, face appears normal and face appears atraumatic;  Salivary: glands normal;  Ear  Hearing: intact;  Auricles: right auricle normal; left auricle normal;  Ear Canals: right ear canal normal; left ear canal normal;  Tympanic Membranes: right tympanic membrane normal; left tympanic membrane normal;  Nose  External Nose: nares patent bilaterally; external nose normal;  Internal Nose: nasal mucosa normal; bilateral inferior turbinates normal;  Nose comments:   She has a mild deviated septum but there was no evidence of obvious infection on her nasal exam  Oral Cavity/Oropharynx  Lips: normal;  Teeth: normal;  Gums: gingiva normal;  Tongue: normal;  Oral mucosa: normal;  Hard palate: normal;  Soft palate: normal;  Tonsils: normal;  Base of Tongue: normal;  Posterior pharyngeal wall: normal;  Neck  Neck: neck normal; neck palpation normal;  Thyroid: thyroid normal;  Lymphatic  Palpation: lymph nodes normal;          Assessment:       1. Throat clearing    2. Postnasal drip    3. Nasal congestion         Plan:            So she is only on a tiny dose of lisinopril, 2.5 mg but we need to discontinue that as we sort out the cause of her throat clearing which approaches a cough in the office but not quite     I wanted to treat her for the possibility of laryngopharyngeal reflux as she is having some heartburn flare-up an indirect laryngeal exam showed a little redness of the larynx but it was only a glimpse because of her posteriorly angled epiglottis.    I want to see her back in a month to either adjust her medicine if she is found success or to do perhaps a fiberoptic exam or consider other studies perhaps some sinus radiology if she has not seen good improvement

## 2023-11-16 ENCOUNTER — PATIENT MESSAGE (OUTPATIENT)
Dept: FAMILY MEDICINE | Facility: CLINIC | Age: 78
End: 2023-11-16
Payer: MEDICARE

## 2023-12-12 DIAGNOSIS — M15.9 PRIMARY OSTEOARTHRITIS INVOLVING MULTIPLE JOINTS: ICD-10-CM

## 2023-12-12 NOTE — TELEPHONE ENCOUNTER
Care Due:                  Date            Visit Type   Department     Provider  --------------------------------------------------------------------------------                                EP -                              PRIMARY      [facility] FAMILY  Last Visit: 09-      CARE ([facility])   MEDICINE       Mandy Renee                              ESTABLISHED                              PATIENT -    [facility]  Next Visit: 12-      VIRTUAL      MEDICINE       Mandy Renee                                                            Last  Test          Frequency    Reason                     Performed    Due Date  --------------------------------------------------------------------------------    Lipid Panel.  12 months..  fenofibric...............  02- 02-    [employer] Embedded Care Due Messages. Reference number: [id number].   12/12/2023 8:30:23 AM CST

## 2023-12-13 ENCOUNTER — OFFICE VISIT (OUTPATIENT)
Dept: OTOLARYNGOLOGY | Facility: CLINIC | Age: 78
End: 2023-12-13
Payer: MEDICARE

## 2023-12-13 VITALS — BODY MASS INDEX: 21.07 KG/M2 | WEIGHT: 107.31 LBS | HEIGHT: 60 IN

## 2023-12-13 DIAGNOSIS — R09.81 NASAL CONGESTION: ICD-10-CM

## 2023-12-13 DIAGNOSIS — R09.82 POSTNASAL DRIP: ICD-10-CM

## 2023-12-13 DIAGNOSIS — R09.89 THROAT CLEARING: Primary | ICD-10-CM

## 2023-12-13 PROCEDURE — 99999 PR PBB SHADOW E&M-EST. PATIENT-LVL III: CPT | Mod: PBBFAC,,, | Performed by: OTOLARYNGOLOGY

## 2023-12-13 PROCEDURE — 99213 OFFICE O/P EST LOW 20 MIN: CPT | Mod: PBBFAC,PN | Performed by: OTOLARYNGOLOGY

## 2023-12-13 PROCEDURE — 99999 PR PBB SHADOW E&M-EST. PATIENT-LVL III: ICD-10-PCS | Mod: PBBFAC,,, | Performed by: OTOLARYNGOLOGY

## 2023-12-13 PROCEDURE — 99213 PR OFFICE/OUTPT VISIT, EST, LEVL III, 20-29 MIN: ICD-10-PCS | Mod: S$PBB,,, | Performed by: OTOLARYNGOLOGY

## 2023-12-13 PROCEDURE — 99213 OFFICE O/P EST LOW 20 MIN: CPT | Mod: S$PBB,,, | Performed by: OTOLARYNGOLOGY

## 2023-12-13 RX ORDER — FLUTICASONE PROPIONATE 50 MCG
SPRAY, SUSPENSION (ML) NASAL
Qty: 47.4 ML | Refills: 4 | Status: SHIPPED | OUTPATIENT
Start: 2023-12-13

## 2023-12-13 RX ORDER — TRAMADOL HYDROCHLORIDE 50 MG/1
TABLET ORAL
Qty: 90 TABLET | Refills: 2 | Status: SHIPPED | OUTPATIENT
Start: 2023-12-13 | End: 2024-02-28

## 2023-12-13 NOTE — PROGRESS NOTES
Subjective:       Patient ID: Suki Macias is a 78 y.o. female.    Chief Complaint: Follow-up (pt is here to follow up on throat clearing )      This patient returns telling me that the throat clearing/coughing has resolved and she did fine great benefit from Flonase helping her breathe through the night through her nose she does still have a little mucus feeling in her throat but everything is better     But as a reminder we did more than one thing we began Flonase for nasal congestion I put her on a PPIs for the possibility that reflux was a factor in this persisting cough throat clearing that is seemed to develop after a prominent case of COVID some months ago, and we also discontinued for the moment her very low dose of lisinopril.              Objective:      ENT Physical Exam  Constitutional  Appearance: patient appears well-developed, well-nourished and well-groomed,  Communication/Voice: communication appropriate for developmental age; vocal quality normal;  Head and Face  Appearance: head appears normal, face appears normal and face appears atraumatic;  Salivary: glands normal;  Ear  Hearing: intact;  Auricles: right auricle normal; left auricle normal;  Ear Canals: right ear canal normal; left ear canal normal;  Tympanic Membranes: right tympanic membrane normal; left tympanic membrane normal;  Nose  External Nose: nares patent bilaterally; external nose normal;  Internal Nose: nasal mucosa normal; septum normal; bilateral inferior turbinates normal;  Oral Cavity/Oropharynx  Lips: normal;  Teeth: normal;  Gums: gingiva normal;  Tongue: normal;  Oral mucosa: normal;  Hard palate: normal;  Soft palate: normal;  Tonsils: normal;  Base of Tongue: normal;  Posterior pharyngeal wall: normal;  Neck  Neck: neck normal; neck palpation normal;  Thyroid: thyroid normal;  Lymphatic  Palpation: lymph nodes normal;          Assessment:       1. Throat clearing    2. Postnasal drip    3. Nasal congestion          Plan:          So she is doing much better with the primary reason she came to see me last month, throat clearing and some cough that persisted after COVID some months before.  She has a virtual visit with her primary care provider tomorrow and they can discuss the lisinopril she probably could reinstitute it and then if that is seems to cause the throat clearing to return that maybe a sign that is the problem     I have asked her to stay on the lisinopril for one more month and then discontinue that I am not sure it was a factor or not in her symptoms and I sent in 90 day Flonase prescriptions because she finds it very helpful and asked for that.

## 2023-12-15 ENCOUNTER — OFFICE VISIT (OUTPATIENT)
Dept: FAMILY MEDICINE | Facility: CLINIC | Age: 78
End: 2023-12-15
Payer: MEDICARE

## 2023-12-15 DIAGNOSIS — I10 PRIMARY HYPERTENSION: ICD-10-CM

## 2023-12-15 DIAGNOSIS — E78.00 HYPERCHOLESTEROLEMIA: Primary | ICD-10-CM

## 2023-12-15 DIAGNOSIS — F41.9 ANXIETY: ICD-10-CM

## 2023-12-15 PROCEDURE — 99214 PR OFFICE/OUTPT VISIT, EST, LEVL IV, 30-39 MIN: ICD-10-PCS | Mod: 95,,, | Performed by: FAMILY MEDICINE

## 2023-12-15 PROCEDURE — 99214 OFFICE O/P EST MOD 30 MIN: CPT | Mod: 95,,, | Performed by: FAMILY MEDICINE

## 2023-12-15 RX ORDER — LORAZEPAM 0.5 MG/1
0.5 TABLET ORAL EVERY 12 HOURS PRN
Qty: 10 TABLET | Refills: 0 | Status: SHIPPED | OUTPATIENT
Start: 2023-12-15

## 2023-12-15 RX ORDER — LOSARTAN POTASSIUM 25 MG/1
25 TABLET ORAL DAILY
Qty: 90 TABLET | Refills: 3 | Status: SHIPPED | OUTPATIENT
Start: 2023-12-15 | End: 2024-12-14

## 2023-12-15 NOTE — PROGRESS NOTES
The patient location is: [facility]  The chief complaint leading to consultation is: anxiety, follolwu p    Visit type: audiovisual    Face to Face time with patient 13 minutes    15 minutes of total time spent on the encounter, which includes face to face time and non-face to face time preparing to see the patient (eg, review of tests), Obtaining and/or reviewing separately obtained history, Documenting clinical information in the electronic or other health record, Independently interpreting results (not separately reported) and communicating results to the patient/family/caregiver, or Care coordination (not separately reported).         Each patient to whom he or she provides medical services by telemedicine is:  (1) informed of the relationship between the physician and patient and the respective role of any other health care provider with respect to management of the patient; and (2) notified that he or she may decline to receive medical services by telemedicine and may withdraw from such care at any time.    Notes:  Subjective:       Patient ID: Suki Macias is a 78 y.o. female.    Chief Complaint: No chief complaint on file.      Presents today for follow up.   Was recently sick and had some shortness of breath after.   ER saw and evaluated her and diagnosed her with anxiety related to her illness and gave her alprazolam.   She would really like to have on hand in case she gets anxious in the future. Discussed that we do not need to do this short term but that would could do on occasion.       Review of Systems   Constitutional:  Negative for activity change and unexpected weight change.   HENT:  Negative for hearing loss, rhinorrhea and trouble swallowing.    Eyes:  Negative for discharge and visual disturbance.   Respiratory:  Negative for chest tightness and wheezing.    Cardiovascular:  Positive for palpitations. Negative for chest pain.   Gastrointestinal:  Negative for blood in stool,  constipation, diarrhea and vomiting.   Endocrine: Negative for polydipsia and polyuria.   Genitourinary:  Negative for difficulty urinating, dysuria, hematuria and menstrual problem.   Musculoskeletal:  Positive for arthralgias and joint swelling. Negative for neck pain.   Neurological:  Positive for headaches. Negative for weakness.   Psychiatric/Behavioral:  Negative for confusion and dysphoric mood.          Objective:      Physical Exam  Constitutional:       General: She is not in acute distress.     Appearance: Normal appearance. She is not ill-appearing.   Pulmonary:      Effort: Pulmonary effort is normal. No respiratory distress.   Neurological:      Mental Status: She is alert.   Psychiatric:         Mood and Affect: Mood normal.         Behavior: Behavior normal.         Thought Content: Thought content normal.         Judgment: Judgment normal.         Assessment:       1. Hypercholesterolemia    2. Primary hypertension    3. Anxiety        Plan:       Problem List Items Addressed This Visit          Cardiac/Vascular    Hypertension     Stable. Well controlled. Continue current medications.            Relevant Medications    losartan (COZAAR) 25 MG tablet    Hypercholesterolemia - Primary     Stable. Well controlled. Continue current medications.             Other Visit Diagnoses       Anxiety        Relevant Medications    LORazepam (ATIVAN) 0.5 MG tablet             Prescription drug monitoring program has been reviewed and is consistent with patient's prescription history. There is no evidence of early fills or obtaining controlled rx's from multiple providers.

## 2024-02-05 ENCOUNTER — HOSPITAL ENCOUNTER (OUTPATIENT)
Dept: RADIOLOGY | Facility: HOSPITAL | Age: 79
Discharge: HOME | End: 2024-02-05
Attending: INTERNAL MEDICINE
Payer: MEDICARE

## 2024-02-05 DIAGNOSIS — M81.0 AGE-RELATED OSTEOPOROSIS WITHOUT CURRENT PATHOLOGICAL FRACTURE: ICD-10-CM

## 2024-02-05 PROCEDURE — 77080 DXA BONE DENSITY AXIAL: CPT | Mod: 26,,, | Performed by: RADIOLOGY

## 2024-02-05 PROCEDURE — 77080 DXA BONE DENSITY AXIAL: CPT | Mod: TC

## 2024-02-06 ENCOUNTER — TELEPHONE (OUTPATIENT)
Dept: FAMILY MEDICINE | Facility: CLINIC | Age: 79
End: 2024-02-06
Payer: MEDICARE

## 2024-02-06 NOTE — TELEPHONE ENCOUNTER
----- Message from Kalyn Shelton sent at 2/6/2024  9:35 AM CST -----  Contact: Baylee  Type: Needs Medical Advice    Who Called: Baylee/  [facility] Office  Best Call Back Number: [phone]  Additional  Information: Requesting to get most recent lab results and X-ray report faxed to [facility] END[facility] FAX# [phone]  Please Advise- Thank you

## 2024-02-09 ENCOUNTER — TELEPHONE (OUTPATIENT)
Dept: FAMILY MEDICINE | Facility: CLINIC | Age: 79
End: 2024-02-09
Payer: MEDICARE

## 2024-02-09 NOTE — TELEPHONE ENCOUNTER
----- Message from Kalyn Shelton sent at 2/9/2024  2:11 PM CST -----  Contact: Baylee  Type: Needs Medical Advice    Who Called: Baylee/ [facility]  Best Call Back Number: [phone]  Additional  Information: Requesting to get most recent lad results, x-ray & pathology report faxed to [facility] End[facility] FAX# [phone] ATTN: Baylee  Please Advise- Thank you

## 2024-02-14 ENCOUNTER — TELEPHONE (OUTPATIENT)
Dept: FAMILY MEDICINE | Facility: CLINIC | Age: 79
End: 2024-02-14
Payer: MEDICARE

## 2024-02-14 NOTE — TELEPHONE ENCOUNTER
----- Message from Aidan Yu sent at 2/14/2024  1:54 PM CST -----  Contact: Dr Rubio's office.  Type: Needs Medical Advice  Who Called:  Dr Rubio's office.   Best Call Back Number: [phone]  Additional Information: States missing labs to go with referral Dx. Needs thyroid and Calcium. Please call. [phone]

## 2024-02-27 DIAGNOSIS — M15.9 PRIMARY OSTEOARTHRITIS INVOLVING MULTIPLE JOINTS: ICD-10-CM

## 2024-02-27 RX ORDER — OMEPRAZOLE 40 MG/1
CAPSULE, DELAYED RELEASE ORAL
Qty: 60 CAPSULE | Refills: 3 | Status: SHIPPED | OUTPATIENT
Start: 2024-02-27

## 2024-02-27 NOTE — TELEPHONE ENCOUNTER
Care Due:                  Date            Visit Type   Department     Provider  --------------------------------------------------------------------------------                                ESTABLISHED                              PATIENT -    [facility]  Last Visit: 12-      VIRTUAL      MEDICINE       Mandy Renee                               -                              PRIMARY      [facility]  Next Visit: 03-      CARE ([facility])   MEDICINE       Mandy Renee                                                            Last  Test          Frequency    Reason                     Performed    Due Date  --------------------------------------------------------------------------------    Lipid Panel.  12 months..  fenofibric...............  02- 02-    [employer] Embedded Care Due Messages. Reference number: [id number].   2/27/2024 8:02:37 AM CST

## 2024-02-28 RX ORDER — TRAMADOL HYDROCHLORIDE 50 MG/1
TABLET ORAL
Qty: 90 TABLET | Refills: 0 | Status: SHIPPED | OUTPATIENT
Start: 2024-02-28 | End: 2024-05-20 | Stop reason: SDUPTHER

## 2024-02-29 ENCOUNTER — LAB VISIT (OUTPATIENT)
Dept: LAB | Facility: HOSPITAL | Age: 79
End: 2024-02-29
Attending: FAMILY MEDICINE
Payer: MEDICARE

## 2024-02-29 DIAGNOSIS — E89.0 POSTABLATIVE HYPOTHYROIDISM: ICD-10-CM

## 2024-02-29 DIAGNOSIS — I10 PRIMARY HYPERTENSION: ICD-10-CM

## 2024-02-29 DIAGNOSIS — R73.03 PREDIABETES: ICD-10-CM

## 2024-02-29 DIAGNOSIS — E78.00 HYPERCHOLESTEROLEMIA: ICD-10-CM

## 2024-02-29 LAB
ALBUMIN SERPL BCP-MCNC: 3.5 G/DL (ref 3.5–5.2)
ALP SERPL-CCNC: 41 U/L (ref 55–135)
ALT SERPL W/O P-5'-P-CCNC: 6 U/L (ref 10–44)
ANION GAP SERPL CALC-SCNC: 10 MMOL/L (ref 8–16)
AST SERPL-CCNC: 15 U/L (ref 10–40)
BASOPHILS # BLD AUTO: 0.03 K/UL (ref 0–0.2)
BASOPHILS NFR BLD: 0.5 % (ref 0–1.9)
BILIRUB SERPL-MCNC: 0.6 MG/DL (ref 0.1–1)
BUN SERPL-MCNC: 33 MG/DL (ref 8–23)
CALCIUM SERPL-MCNC: 8.8 MG/DL (ref 8.7–10.5)
CHLORIDE SERPL-SCNC: 105 MMOL/L (ref 95–110)
CHOLEST SERPL-MCNC: 214 MG/DL (ref 120–199)
CHOLEST/HDLC SERPL: 3.7 {RATIO} (ref 2–5)
CO2 SERPL-SCNC: 27 MMOL/L (ref 23–29)
CREAT SERPL-MCNC: 1.1 MG/DL (ref 0.5–1.4)
DIFFERENTIAL METHOD BLD: ABNORMAL
EOSINOPHIL # BLD AUTO: 0.1 K/UL (ref 0–0.5)
EOSINOPHIL NFR BLD: 2.4 % (ref 0–8)
ERYTHROCYTE [DISTWIDTH] IN BLOOD BY AUTOMATED COUNT: 14.7 % (ref 11.5–14.5)
EST. GFR  (NO RACE VARIABLE): 50.8 ML/MIN/1.73 M^2
ESTIMATED AVG GLUCOSE: 131 MG/DL (ref 68–131)
GLUCOSE SERPL-MCNC: 93 MG/DL (ref 70–110)
HBA1C MFR BLD: 6.2 % (ref 4–5.6)
HCT VFR BLD AUTO: 37.8 % (ref 37–48.5)
HDLC SERPL-MCNC: 58 MG/DL (ref 40–75)
HDLC SERPL: 27.1 % (ref 20–50)
HGB BLD-MCNC: 12.3 G/DL (ref 12–16)
IMM GRANULOCYTES # BLD AUTO: 0.03 K/UL (ref 0–0.04)
IMM GRANULOCYTES NFR BLD AUTO: 0.5 % (ref 0–0.5)
LDLC SERPL CALC-MCNC: 144.6 MG/DL (ref 63–159)
LYMPHOCYTES # BLD AUTO: 2.1 K/UL (ref 1–4.8)
LYMPHOCYTES NFR BLD: 36.5 % (ref 18–48)
MCH RBC QN AUTO: 29.5 PG (ref 27–31)
MCHC RBC AUTO-ENTMCNC: 32.5 G/DL (ref 32–36)
MCV RBC AUTO: 91 FL (ref 82–98)
MONOCYTES # BLD AUTO: 0.6 K/UL (ref 0.3–1)
MONOCYTES NFR BLD: 10.9 % (ref 4–15)
NEUTROPHILS # BLD AUTO: 2.8 K/UL (ref 1.8–7.7)
NEUTROPHILS NFR BLD: 49.2 % (ref 38–73)
NONHDLC SERPL-MCNC: 156 MG/DL
NRBC BLD-RTO: 0 /100 WBC
PLATELET # BLD AUTO: 520 K/UL (ref 150–450)
PMV BLD AUTO: 10.5 FL (ref 9.2–12.9)
POTASSIUM SERPL-SCNC: 4.2 MMOL/L (ref 3.5–5.1)
PROT SERPL-MCNC: 6.3 G/DL (ref 6–8.4)
RBC # BLD AUTO: 4.17 M/UL (ref 4–5.4)
SODIUM SERPL-SCNC: 142 MMOL/L (ref 136–145)
TRIGL SERPL-MCNC: 57 MG/DL (ref 30–150)
TSH SERPL DL<=0.005 MIU/L-ACNC: 2.47 UIU/ML (ref 0.4–4)
WBC # BLD AUTO: 5.78 K/UL (ref 3.9–12.7)

## 2024-02-29 PROCEDURE — 84443 ASSAY THYROID STIM HORMONE: CPT | Performed by: FAMILY MEDICINE

## 2024-02-29 PROCEDURE — 80061 LIPID PANEL: CPT | Performed by: FAMILY MEDICINE

## 2024-02-29 PROCEDURE — 85025 COMPLETE CBC W/AUTO DIFF WBC: CPT | Performed by: FAMILY MEDICINE

## 2024-02-29 PROCEDURE — 83036 HEMOGLOBIN GLYCOSYLATED A1C: CPT | Performed by: FAMILY MEDICINE

## 2024-02-29 PROCEDURE — 80053 COMPREHEN METABOLIC PANEL: CPT | Performed by: FAMILY MEDICINE

## 2024-02-29 PROCEDURE — 36415 COLL VENOUS BLD VENIPUNCTURE: CPT | Performed by: FAMILY MEDICINE

## 2024-03-08 ENCOUNTER — INFUSION (OUTPATIENT)
Dept: INFUSION THERAPY | Facility: HOSPITAL | Age: 79
End: 2024-03-08
Attending: FAMILY MEDICINE
Payer: MEDICARE

## 2024-03-08 VITALS
SYSTOLIC BLOOD PRESSURE: 163 MMHG | RESPIRATION RATE: 18 BRPM | HEIGHT: 60 IN | DIASTOLIC BLOOD PRESSURE: 83 MMHG | HEART RATE: 74 BPM | WEIGHT: 109.13 LBS | OXYGEN SATURATION: 99 % | TEMPERATURE: 98 F | BODY MASS INDEX: 21.42 KG/M2

## 2024-03-08 DIAGNOSIS — M81.0 AGE-RELATED OSTEOPOROSIS WITHOUT CURRENT PATHOLOGICAL FRACTURE: Primary | ICD-10-CM

## 2024-03-08 PROCEDURE — 63600175 PHARM REV CODE 636 W HCPCS: Mod: JZ,JG | Performed by: FAMILY MEDICINE

## 2024-03-08 PROCEDURE — 96372 THER/PROPH/DIAG INJ SC/IM: CPT

## 2024-03-08 RX ADMIN — DENOSUMAB 60 MG: 60 INJECTION SUBCUTANEOUS at 10:03

## 2024-03-08 NOTE — NURSING
Pt had a fall about three weeks ago during the night. She hit her head and her eye during the fall but has not been to see a doctor about it. She believes it was the beginning of when she had the flu and states that there was no indication that she was about to fall. She says she has been fine since, aside from having the flu, and has continued with her normal activities such as playing golf. Pt is receiving a Prolia injection today for osteoporosis.      Ewdige Finn RN

## 2024-03-08 NOTE — PLAN OF CARE
Problem: Fall Injury Risk  Goal: Absence of Fall and Fall-Related Injury  Outcome: Ongoing, Progressing  Intervention: Identify and Manage Contributors  Flowsheets (Taken 3/8/2024 1000)  Self-Care Promotion: independence encouraged  Medication Review/Management: medications reviewed  Intervention: Promote Injury-Free Environment  Flowsheets (Taken 3/8/2024 1000)  Safety Promotion/Fall Prevention: medications reviewed

## 2024-03-15 ENCOUNTER — OFFICE VISIT (OUTPATIENT)
Dept: FAMILY MEDICINE | Facility: CLINIC | Age: 79
End: 2024-03-15
Payer: MEDICARE

## 2024-03-15 VITALS
RESPIRATION RATE: 18 BRPM | BODY MASS INDEX: 21.44 KG/M2 | SYSTOLIC BLOOD PRESSURE: 130 MMHG | OXYGEN SATURATION: 88 % | WEIGHT: 109.19 LBS | HEART RATE: 62 BPM | HEIGHT: 60 IN | DIASTOLIC BLOOD PRESSURE: 84 MMHG

## 2024-03-15 DIAGNOSIS — M81.0 AGE-RELATED OSTEOPOROSIS WITHOUT CURRENT PATHOLOGICAL FRACTURE: Primary | ICD-10-CM

## 2024-03-15 DIAGNOSIS — E89.0 POSTABLATIVE HYPOTHYROIDISM: ICD-10-CM

## 2024-03-15 DIAGNOSIS — M25.541 ARTHRALGIA OF BOTH HANDS: ICD-10-CM

## 2024-03-15 DIAGNOSIS — M25.542 ARTHRALGIA OF BOTH HANDS: ICD-10-CM

## 2024-03-15 DIAGNOSIS — D75.839 THROMBOCYTOSIS: ICD-10-CM

## 2024-03-15 PROCEDURE — 99214 OFFICE O/P EST MOD 30 MIN: CPT | Mod: PBBFAC,PN | Performed by: FAMILY MEDICINE

## 2024-03-15 PROCEDURE — 99213 OFFICE O/P EST LOW 20 MIN: CPT | Mod: S$PBB,,, | Performed by: FAMILY MEDICINE

## 2024-03-15 PROCEDURE — 99999 PR PBB SHADOW E&M-EST. PATIENT-LVL IV: CPT | Mod: PBBFAC,,, | Performed by: FAMILY MEDICINE

## 2024-03-15 RX ORDER — CELECOXIB 200 MG/1
CAPSULE ORAL
Qty: 180 CAPSULE | Refills: 3 | Status: SHIPPED | OUTPATIENT
Start: 2024-03-15

## 2024-03-15 NOTE — PROGRESS NOTES
Subjective:       Patient ID: Suki Macias is a 79 y.o. female.    Chief Complaint: Follow-up (Dizziness, muscle cramps)    Ms. Macias presents today for follow up.     She is here today to discuss several things.     She has noticed that she has some decreased calcium levels. She has also had a new globus sensation. She would like to see a new endocrinologist Dr. Rubio.     She has osteoporosis, hypothyroidism and a history of hyperparathyroidism s/p parathyroid adenoma removal.       Review of Systems   Constitutional:  Negative for activity change, appetite change, fatigue and fever.   Respiratory:  Negative for shortness of breath.    Gastrointestinal:  Negative for abdominal pain.   Integumentary:  Negative for rash.         Objective:      Physical Exam  Vitals and nursing note reviewed.   Constitutional:       General: She is not in acute distress.     Appearance: She is not ill-appearing.   Cardiovascular:      Rate and Rhythm: Normal rate and regular rhythm.      Heart sounds: No murmur heard.  Pulmonary:      Effort: Pulmonary effort is normal.      Breath sounds: Normal breath sounds. No wheezing.   Skin:     General: Skin is warm and dry.      Findings: No rash.   Neurological:      Mental Status: She is alert.   Psychiatric:         Mood and Affect: Mood normal.         Behavior: Behavior normal.         Assessment:       1. Age-related osteoporosis without current pathological fracture    2. Postablative hypothyroidism    3. Thrombocytosis        Plan:       Problem List Items Addressed This Visit          Endocrine    Hypothyroid    Relevant Orders    Ambulatory referral/consult to Endocrinology    Age-related osteoporosis without current pathological fracture - Primary    Relevant Orders    Ambulatory referral/consult to Endocrinology     Other Visit Diagnoses       Thrombocytosis        Relevant Orders    CBC Without Differential          Rechecking CBC  Will send her to endocrine to follow  up some of her chronic conditions and concerns.

## 2024-03-27 ENCOUNTER — LAB VISIT (OUTPATIENT)
Dept: LAB | Facility: HOSPITAL | Age: 79
End: 2024-03-27
Attending: FAMILY MEDICINE
Payer: MEDICARE

## 2024-03-27 DIAGNOSIS — D75.839 THROMBOCYTOSIS: ICD-10-CM

## 2024-03-27 LAB
ERYTHROCYTE [DISTWIDTH] IN BLOOD BY AUTOMATED COUNT: 15.9 % (ref 11.5–14.5)
HCT VFR BLD AUTO: 38.8 % (ref 37–48.5)
HGB BLD-MCNC: 12.4 G/DL (ref 12–16)
MCH RBC QN AUTO: 29.3 PG (ref 27–31)
MCHC RBC AUTO-ENTMCNC: 32 G/DL (ref 32–36)
MCV RBC AUTO: 92 FL (ref 82–98)
PLATELET # BLD AUTO: 381 K/UL (ref 150–450)
PMV BLD AUTO: 10.7 FL (ref 9.2–12.9)
RBC # BLD AUTO: 4.23 M/UL (ref 4–5.4)
WBC # BLD AUTO: 8.99 K/UL (ref 3.9–12.7)

## 2024-03-27 PROCEDURE — 36415 COLL VENOUS BLD VENIPUNCTURE: CPT | Performed by: FAMILY MEDICINE

## 2024-03-27 PROCEDURE — 85027 COMPLETE CBC AUTOMATED: CPT | Performed by: FAMILY MEDICINE

## 2024-05-20 DIAGNOSIS — M15.9 PRIMARY OSTEOARTHRITIS INVOLVING MULTIPLE JOINTS: ICD-10-CM

## 2024-05-20 RX ORDER — TRAMADOL HYDROCHLORIDE 50 MG/1
TABLET ORAL
Qty: 90 TABLET | Refills: 0 | Status: CANCELLED | OUTPATIENT
Start: 2024-05-20

## 2024-05-20 RX ORDER — TRAMADOL HYDROCHLORIDE 50 MG/1
50 TABLET ORAL EVERY 8 HOURS PRN
Qty: 90 TABLET | Refills: 0 | Status: SHIPPED | OUTPATIENT
Start: 2024-05-20

## 2024-05-20 RX ORDER — LEVOTHYROXINE SODIUM 88 UG/1
88 TABLET ORAL
Qty: 90 TABLET | Refills: 3 | Status: SHIPPED | OUTPATIENT
Start: 2024-05-20

## 2024-05-20 NOTE — TELEPHONE ENCOUNTER
"I have reviewed and agree with the assessment below. "Refill Routing Note" indicates that the Refill Center has already assessed for refill, and found it outside of scope. Thank you.    "

## 2024-05-20 NOTE — TELEPHONE ENCOUNTER
Refill Routing Note   Medication(s) are not appropriate for processing by [facility] for the following reason(s):        Outside of protocol  No active prescription written by provider: Not yet signed by current PCP    [facility] action(s):  Defer  Route      Medication Therapy Plan:         Appointments  past 12m or future 3m with PCP    Date Provider   Last Visit   3/15/2024 Mandy Renee MD   Next Visit   Visit date not found Mandy Renee MD   ED visits in past 90 days: 0        Note composed:10:36 AM 05/20/2024

## 2024-05-20 NOTE — TELEPHONE ENCOUNTER
No care due was identified.  Health [facility] Embedded Care Due Messages. Reference number: [id number].   5/20/2024 10:12:27 AM CDT

## 2024-05-20 NOTE — TELEPHONE ENCOUNTER
----- Message from Ioana Bardales sent at 5/20/2024 10:21 AM CDT -----  Type:  RX Refill Request    Who Called: PT  Refill or New Rx:Refill  RX Name and Strength: Synthroid  Preferred Pharmacy with phone number:[facility] [phone]  Would the patient rather a call back or a response via [facility]? call  Best Call Back Number: [phone]  Additional Information: guerda

## 2024-06-05 ENCOUNTER — OFFICE VISIT (OUTPATIENT)
Dept: DERMATOLOGY | Facility: CLINIC | Age: 79
End: 2024-06-05
Payer: MEDICARE

## 2024-06-05 VITALS — HEIGHT: 60 IN | BODY MASS INDEX: 21.42 KG/M2 | WEIGHT: 109.13 LBS

## 2024-06-05 DIAGNOSIS — D22.9 MULTIPLE BENIGN NEVI: ICD-10-CM

## 2024-06-05 DIAGNOSIS — Z85.828 ENCOUNTER FOR FOLLOW-UP SURVEILLANCE OF SKIN CANCER: Primary | ICD-10-CM

## 2024-06-05 DIAGNOSIS — L81.4 SOLAR LENTIGO: ICD-10-CM

## 2024-06-05 DIAGNOSIS — L57.8 OTHER SKIN CHANGES DUE TO CHRONIC EXPOSURE TO NONIONIZING RADIATION: ICD-10-CM

## 2024-06-05 DIAGNOSIS — Z08 ENCOUNTER FOR FOLLOW-UP SURVEILLANCE OF SKIN CANCER: Primary | ICD-10-CM

## 2024-06-05 DIAGNOSIS — L82.1 SEBORRHEIC KERATOSES: ICD-10-CM

## 2024-06-05 DIAGNOSIS — D69.2 SOLAR PURPURA: ICD-10-CM

## 2024-06-05 PROCEDURE — 99213 OFFICE O/P EST LOW 20 MIN: CPT | Mod: AQ,S$GLB,, | Performed by: DERMATOLOGY

## 2024-06-05 NOTE — PROGRESS NOTES
Subjective:      Patient ID:  Suki Macias is a 79 y.o. 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 , Disp: , Rfl:   ·  denosumab (PROLIA) 60 mg/mL Syrg, every 6 (six) months., Disp: , Rfl:   ·  fenofibric acid (FIBRICOR) 135 mg CpDR, TAKE 1 CAPSULE ONCE DAILY WITH EVENING MEAL FOR CHOLESTEROL, Disp: 90 capsule, Rfl: 3  ·  fluticasone propionate (FLONASE) 50 mcg/actuation nasal spray, 2 sprays into each side every night before bed. Please give 3x 15.8 ml bottles for 90 days, Disp: 47.4 mL, Rfl: 4  ·  LORazepam (ATIVAN) 0.5 MG tablet, Take 1 tablet (0.5 mg total) by mouth every 12 (twelve) hours as needed (panic attack)., Disp: 10 tablet, Rfl: 0  ·  losartan (COZAAR) 25 MG tablet, Take 1 tablet (25 mg total) by mouth once daily., Disp: 90 tablet, Rfl: 3  ·  magnesium 30 mg Tab, Take by mouth once., Disp: , Rfl:   ·  omeprazole (PRILOSEC) 40 MG capsule, TAKE 1 CAPSULE BY MOUTH TWICE DAILY BEFORE MEALS THANK YOU!, Disp: 60 capsule, Rfl: 3  ·  SYNTHROID 88 mcg tablet, Take 1 tablet (88 mcg total) by mouth before breakfast., Disp: 90  tablet, Rfl: 3  ·  traMADoL (ULTRAM) 50 mg tablet, Take 1 tablet (50 mg total) by mouth every 8 (eight) hours as needed for Pain., Disp: 90 tablet, Rfl: 0  ·  chlorpheniramine (CHLOR-TRIMETON) 4 mg tablet, Take 1 tablet (4 mg total) by mouth every 6 (six) hours as needed for Allergies., Disp: 20 tablet, Rfl: 0            Review of Systems   Constitutional:  Negative for fever, chills, fatigue and malaise.   Respiratory:  Negative for cough and shortness of breath.    Gastrointestinal:  Negative for nausea and vomiting.   Skin:  Positive for dry skin, activity-related sunscreen use and wears hat. Negative for itching, rash and daily sunscreen use.   Hematologic/Lymphatic: Bruises/bleeds easily.       Objective:   Physical Exam   Constitutional: She appears well-developed and well-nourished. No distress.   Neurological: She is alert and oriented to person, place, and time. She is not disoriented.   Psychiatric: She has a normal mood and affect.   Skin:   Areas Examined (abnormalities noted in diagram):   Scalp / Hair Palpated and Inspected  Head / Face Inspection Performed  Neck Inspection Performed  Chest / Axilla Inspection Performed  Abdomen Inspection Performed  Genitals / Buttocks / Groin Inspection Performed  Back Inspection Performed  RUE Inspected  LUE Inspection Performed  RLE Inspected  LLE Inspection Performed  Nails and Digits Inspection Performed                     Diagram Legend     Erythematous scaling macule/papule c/w actinic keratosis       Vascular papule c/w angioma      Pigmented verrucoid papule/plaque c/w seborrheic keratosis      Yellow umbilicated papule c/w sebaceous hyperplasia      Irregularly shaped tan macule c/w lentigo     1-2 mm smooth white papules consistent with Milia      Movable subcutaneous cyst with punctum c/w epidermal inclusion cyst      Subcutaneous movable cyst c/w pilar cyst      Firm pink to brown papule c/w dermatofibroma      Pedunculated fleshy papule(s) c/w skin  tag(s)      Evenly pigmented macule c/w junctional nevus     Mildly variegated pigmented, slightly irregular-bordered macule c/w mildly atypical nevus      Flesh colored to evenly pigmented papule c/w intradermal nevus       Pink pearly papule/plaque c/w basal cell carcinoma      Erythematous hyperkeratotic cursted plaque c/w SCC      Surgical scar with no sign of skin cancer recurrence      Open and closed comedones      Inflammatory papules and pustules      Verrucoid papule consistent consistent with wart     Erythematous eczematous patches and plaques     Dystrophic onycholytic nail with subungual debris c/w onychomycosis     Umbilicated papule    Erythematous-base heme-crusted tan verrucoid plaque consistent with inflamed seborrheic keratosis     Erythematous Silvery Scaling Plaque c/w Psoriasis     See annotation      Assessment / Plan:        Encounter for follow-up surveillance of skin cancer  Area of previous SCCIS (scalp) examined. Site well healed with no signs of recurrence.    Total body skin examination performed today including at least 12 points as noted in physical examination. No lesions suspicious for malignancy noted.    Solar purpura  Common finding in aging sun damaged skin; ASA contributes    Seborrheic keratoses  These are benign inherited growths without a malignant potential. Reassurance given to patient. No treatment is necessary.     Solar lentigo  This is a benign hyperpigmented sun induced lesion. Daily sun protection will reduce the number of new lesions. Treatment of these benign lesions are considered cosmetic.    Multiple benign nevi  Careful dermoscopy evaluation of nevi performed with none identified as needing biopsy today  Monitor for new mole or moles that are becoming bigger, darker, irritated, or developing irregular borders.     Other skin changes due to chronic exposure to nonionizing radiation  Patient instructed in importance in daily broad spectrum sun protection of at  least spf 30. Mineral sunscreen ingredients preferred (Zinc +/- Titanium) and can be found OTC.   Recommend Elta MD for daily use on face and neck.  Patient encouraged to wear hat for all outdoor exposure.   Also discussed sun avoidance and use of protective clothing.             Follow up in about 1 year (around 6/5/2025), or if symptoms worsen or fail to improve.

## 2024-06-19 RX ORDER — OMEPRAZOLE 40 MG/1
40 CAPSULE, DELAYED RELEASE ORAL EVERY MORNING
Qty: 30 CAPSULE | Refills: 11 | Status: SHIPPED | OUTPATIENT
Start: 2024-06-19 | End: 2025-06-19

## 2024-06-19 RX ORDER — FLUTICASONE PROPIONATE 50 MCG
SPRAY, SUSPENSION (ML) NASAL
Qty: 47.4 ML | Refills: 4 | Status: SHIPPED | OUTPATIENT
Start: 2024-06-19

## 2024-06-21 DIAGNOSIS — E78.2 HYPERCHOLESTEROLEMIA WITH HYPERTRIGLYCERIDEMIA: ICD-10-CM

## 2024-06-21 RX ORDER — FENOFIBRIC ACID 135 MG/1
135 CAPSULE, DELAYED RELEASE ORAL
Qty: 90 CAPSULE | Refills: 2 | Status: SHIPPED | OUTPATIENT
Start: 2024-06-21

## 2024-06-21 NOTE — TELEPHONE ENCOUNTER
No care due was identified.  Health [facility] Embedded Care Due Messages. Reference number: [id number].   6/21/2024 10:57:28 AM CDT

## 2024-06-21 NOTE — TELEPHONE ENCOUNTER
Refill Routing Note   Medication(s) are not appropriate for processing by [facility] for the following reason(s):        Responsible provider unclear    [facility] action(s):  Defer      Medication Therapy Plan: Last ordered: 6/21/23 by Hailey Lyman MD. Recent OV but no current order under PCP.      Appointments  past 12m or future 3m with PCP    Date Provider   Last Visit   3/15/2024 Mandy Renee MD   Next Visit   Visit date not found Mandy Renee MD   ED visits in past 90 days: 0        Note composed:2:22 PM 06/21/2024

## 2024-07-30 DIAGNOSIS — Z00.00 ENCOUNTER FOR MEDICARE ANNUAL WELLNESS EXAM: ICD-10-CM

## 2024-08-14 DIAGNOSIS — M25.542 ARTHRALGIA OF BOTH HANDS: ICD-10-CM

## 2024-08-14 DIAGNOSIS — I10 PRIMARY HYPERTENSION: ICD-10-CM

## 2024-08-14 DIAGNOSIS — M15.9 PRIMARY OSTEOARTHRITIS INVOLVING MULTIPLE JOINTS: ICD-10-CM

## 2024-08-14 DIAGNOSIS — M25.541 ARTHRALGIA OF BOTH HANDS: ICD-10-CM

## 2024-08-14 RX ORDER — LEVOTHYROXINE SODIUM 88 UG/1
88 TABLET ORAL
Qty: 90 TABLET | Refills: 1 | Status: SHIPPED | OUTPATIENT
Start: 2024-08-14

## 2024-08-14 RX ORDER — LOSARTAN POTASSIUM 25 MG/1
25 TABLET ORAL DAILY
Qty: 90 TABLET | Refills: 1 | Status: SHIPPED | OUTPATIENT
Start: 2024-08-14

## 2024-08-14 RX ORDER — CELECOXIB 200 MG/1
CAPSULE ORAL
Qty: 180 CAPSULE | Refills: 3 | Status: SHIPPED | OUTPATIENT
Start: 2024-08-14

## 2024-08-14 RX ORDER — TRAMADOL HYDROCHLORIDE 50 MG/1
50 TABLET ORAL EVERY 8 HOURS PRN
Qty: 90 TABLET | Refills: 0 | Status: SHIPPED | OUTPATIENT
Start: 2024-08-14

## 2024-08-14 NOTE — TELEPHONE ENCOUNTER
Refill Routing Note   Medication(s) are not appropriate for processing by [facility] for the following reason(s):        Outside of protocol    [facility] action(s):  Approve  Route               Appointments  past 12m or future 3m with PCP    Date Provider   Last Visit   3/15/2024 Mandy Renee MD   Next Visit   8/15/2024 Mandy Renee MD   ED visits in past 90 days: 0        Note composed:12:56 PM 08/14/2024

## 2024-08-14 NOTE — TELEPHONE ENCOUNTER
No care due was identified.  [facility] Embedded Care Due Messages. Reference number: [id number].   8/14/2024 7:58:37 AM CDT

## 2024-08-15 ENCOUNTER — OFFICE VISIT (OUTPATIENT)
Dept: FAMILY MEDICINE | Facility: CLINIC | Age: 79
End: 2024-08-15
Payer: MEDICARE

## 2024-08-15 DIAGNOSIS — M81.0 AGE-RELATED OSTEOPOROSIS WITHOUT CURRENT PATHOLOGICAL FRACTURE: Primary | ICD-10-CM

## 2024-08-15 DIAGNOSIS — M54.2 CERVICALGIA: ICD-10-CM

## 2024-08-15 DIAGNOSIS — N18.31 CHRONIC KIDNEY DISEASE, STAGE 3A: ICD-10-CM

## 2024-08-15 PROCEDURE — 99213 OFFICE O/P EST LOW 20 MIN: CPT | Mod: 95,,, | Performed by: FAMILY MEDICINE

## 2024-08-15 RX ORDER — METHYLPREDNISOLONE 4 MG/1
TABLET ORAL
Qty: 21 EACH | Refills: 0 | Status: SHIPPED | OUTPATIENT
Start: 2024-08-15 | End: 2024-09-05

## 2024-08-15 NOTE — PROGRESS NOTES
The patient location is: [place]  The chief complaint leading to consultation is: neck pain    Visit type: audiovisual    Face to Face time with patient: 10 minutes  11 minutes of total time spent on the encounter, which includes face to face time and non-face to face time preparing to see the patient (eg, review of tests), Obtaining and/or reviewing separately obtained history, Documenting clinical information in the electronic or other health record, Independently interpreting results (not separately reported) and communicating results to the patient/family/caregiver, or Care coordination (not separately reported).         Each patient to whom he or she provides medical services by telemedicine is:  (1) informed of the relationship between the physician and patient and the respective role of any other health care provider with respect to management of the patient; and (2) notified that he or she may decline to receive medical services by telemedicine and may withdraw from such care at any time.    Notes:  Subjective:       Patient ID: Suki Macias is a 79 y.o. female.    Chief Complaint: No chief complaint on file.      Needs labs and calcium scheduled. Has her prolia on the 9th.     She picked up something heavy a few weeks ago and she is having significant pain in her neck. She is wondering what she needs to do. The pain goes from her neck right down to her shoulder blade. Constant dull throbbing ache.       Review of Systems   Constitutional:  Positive for activity change. Negative for unexpected weight change.   HENT:  Negative for hearing loss, rhinorrhea and trouble swallowing.    Eyes:  Negative for discharge and visual disturbance.   Respiratory:  Negative for chest tightness and wheezing.    Cardiovascular:  Negative for chest pain and palpitations.   Gastrointestinal:  Negative for blood in stool, constipation, diarrhea and vomiting.   Endocrine: Negative for polydipsia and polyuria.   Genitourinary:   Negative for difficulty urinating, dysuria, hematuria and menstrual problem.   Musculoskeletal:  Positive for arthralgias and neck pain. Negative for joint swelling.   Neurological:  Negative for weakness and headaches.   Psychiatric/Behavioral:  Negative for confusion and dysphoric mood.          Objective:      Physical Exam  Constitutional:       General: She is not in acute distress.     Appearance: Normal appearance. She is not ill-appearing.   Pulmonary:      Effort: Pulmonary effort is normal. No respiratory distress.   Musculoskeletal:        Arms:       Comments: Location of pain demarcated in purple   Neurological:      Mental Status: She is alert.   Psychiatric:         Mood and Affect: Mood normal.         Behavior: Behavior normal.         Thought Content: Thought content normal.         Judgment: Judgment normal.         Assessment:       1. Age-related osteoporosis without current pathological fracture    2. Chronic kidney disease, stage 3a    3. Cervicalgia        Plan:       Problem List Items Addressed This Visit          Renal/    Chronic kidney disease, stage 3a    Relevant Orders    Comprehensive metabolic panel       Endocrine    Age-related osteoporosis without current pathological fracture - Primary    Relevant Orders    Comprehensive metabolic panel     Other Visit Diagnoses       Cervicalgia        Relevant Medications    methylPREDNISolone (MEDROL DOSEPACK) 4 mg tablet           Will treat with anti-inflammatory medrol-   If no improvement can try PT Pain is not midline over spine- lower suspicion of bony injury but with osteoporosis history

## 2024-08-21 RX ORDER — OMEPRAZOLE 40 MG/1
40 CAPSULE, DELAYED RELEASE ORAL EVERY MORNING
Qty: 30 CAPSULE | Refills: 11 | Status: SHIPPED | OUTPATIENT
Start: 2024-08-21 | End: 2025-08-21

## 2024-08-26 ENCOUNTER — LAB VISIT (OUTPATIENT)
Dept: LAB | Facility: HOSPITAL | Age: 79
End: 2024-08-26
Attending: FAMILY MEDICINE
Payer: MEDICARE

## 2024-08-26 DIAGNOSIS — M81.0 AGE-RELATED OSTEOPOROSIS WITHOUT CURRENT PATHOLOGICAL FRACTURE: ICD-10-CM

## 2024-08-26 DIAGNOSIS — N18.31 CHRONIC KIDNEY DISEASE, STAGE 3A: ICD-10-CM

## 2024-08-26 LAB
ALBUMIN SERPL BCP-MCNC: 3.8 G/DL (ref 3.5–5.2)
ALP SERPL-CCNC: 46 U/L (ref 55–135)
ALT SERPL W/O P-5'-P-CCNC: 8 U/L (ref 10–44)
ANION GAP SERPL CALC-SCNC: 12 MMOL/L (ref 8–16)
AST SERPL-CCNC: 16 U/L (ref 10–40)
BILIRUB SERPL-MCNC: 0.5 MG/DL (ref 0.1–1)
BUN SERPL-MCNC: 37 MG/DL (ref 8–23)
CALCIUM SERPL-MCNC: 9.3 MG/DL (ref 8.7–10.5)
CHLORIDE SERPL-SCNC: 102 MMOL/L (ref 95–110)
CO2 SERPL-SCNC: 25 MMOL/L (ref 23–29)
CREAT SERPL-MCNC: 1.5 MG/DL (ref 0.5–1.4)
EST. GFR  (NO RACE VARIABLE): 35 ML/MIN/1.73 M^2
GLUCOSE SERPL-MCNC: 101 MG/DL (ref 70–110)
POTASSIUM SERPL-SCNC: 4.8 MMOL/L (ref 3.5–5.1)
PROT SERPL-MCNC: 6.6 G/DL (ref 6–8.4)
SODIUM SERPL-SCNC: 139 MMOL/L (ref 136–145)

## 2024-08-26 PROCEDURE — 36415 COLL VENOUS BLD VENIPUNCTURE: CPT | Performed by: FAMILY MEDICINE

## 2024-08-26 PROCEDURE — 80053 COMPREHEN METABOLIC PANEL: CPT | Performed by: FAMILY MEDICINE

## 2024-08-28 ENCOUNTER — TELEPHONE (OUTPATIENT)
Dept: FAMILY MEDICINE | Facility: CLINIC | Age: 79
End: 2024-08-28
Payer: MEDICARE

## 2024-08-28 NOTE — TELEPHONE ENCOUNTER
----- Message from Nella Chavez LPN sent at 8/27/2024  9:37 AM CDT -----  Regarding: FW: Need signed orders for Prolia    ----- Message -----  From: Valentin Cisneros  Sent: 8/27/2024   9:09 AM CDT  To: Valentin Cisneros; Thelma FELDMAN Staff  Subject: Need signed orders for Prolia                      Patient has the next Prolia appointment due and scheduled for 9/9/24, previously ordered by her previous PCP, Dr Rambo Mayfield.   She had already completed the lab work and dexa scan.      Please advise if Dr Renee would now be continuing the Prolia for her, and if she can sign the order in the therapy plan.     Thank you,  Valentin [facility]

## 2024-09-09 ENCOUNTER — INFUSION (OUTPATIENT)
Dept: INFUSION THERAPY | Facility: HOSPITAL | Age: 79
End: 2024-09-09
Attending: FAMILY MEDICINE
Payer: MEDICARE

## 2024-09-09 VITALS
WEIGHT: 103.38 LBS | BODY MASS INDEX: 19.52 KG/M2 | SYSTOLIC BLOOD PRESSURE: 168 MMHG | OXYGEN SATURATION: 97 % | HEART RATE: 87 BPM | HEIGHT: 61 IN | TEMPERATURE: 97 F | RESPIRATION RATE: 16 BRPM | DIASTOLIC BLOOD PRESSURE: 89 MMHG

## 2024-09-09 DIAGNOSIS — M81.0 AGE-RELATED OSTEOPOROSIS WITHOUT CURRENT PATHOLOGICAL FRACTURE: Primary | ICD-10-CM

## 2024-09-09 PROCEDURE — 63600175 PHARM REV CODE 636 W HCPCS: Mod: JZ,JG | Performed by: FAMILY MEDICINE

## 2024-09-09 PROCEDURE — 96372 THER/PROPH/DIAG INJ SC/IM: CPT

## 2024-09-09 RX ADMIN — DENOSUMAB 60 MG: 60 INJECTION SUBCUTANEOUS at 11:09

## 2024-09-09 NOTE — PLAN OF CARE
Problem: Fall Injury Risk  Goal: Absence of Fall and Fall-Related Injury  Outcome: Progressing  Intervention: Identify and Manage Contributors  Flowsheets (Taken 9/9/2024 1102)  Medication Review/Management: medications reviewed  Intervention: Promote Injury-Free Environment  Flowsheets (Taken 9/9/2024 1102)  Safety Promotion/Fall Prevention: assistive device/personal item within reach

## 2024-09-18 DIAGNOSIS — M15.9 PRIMARY OSTEOARTHRITIS INVOLVING MULTIPLE JOINTS: ICD-10-CM

## 2024-09-18 RX ORDER — TRAMADOL HYDROCHLORIDE 50 MG/1
50 TABLET ORAL EVERY 8 HOURS PRN
Qty: 90 TABLET | Refills: 0 | Status: SHIPPED | OUTPATIENT
Start: 2024-09-18

## 2024-09-18 NOTE — TELEPHONE ENCOUNTER
No care due was identified.  [facility] Embedded Care Due Messages. Reference number: [id number].   9/18/2024 12:06:04 PM CDT

## 2024-10-04 ENCOUNTER — LAB VISIT (OUTPATIENT)
Dept: LAB | Facility: HOSPITAL | Age: 79
End: 2024-10-04
Attending: FAMILY MEDICINE
Payer: MEDICARE

## 2024-10-04 DIAGNOSIS — N18.31 CHRONIC KIDNEY DISEASE, STAGE 3A: ICD-10-CM

## 2024-10-04 LAB
ALBUMIN SERPL BCP-MCNC: 3.9 G/DL (ref 3.5–5.2)
ANION GAP SERPL CALC-SCNC: 10 MMOL/L (ref 8–16)
BUN SERPL-MCNC: 34 MG/DL (ref 8–23)
CALCIUM SERPL-MCNC: 9.2 MG/DL (ref 8.7–10.5)
CHLORIDE SERPL-SCNC: 103 MMOL/L (ref 95–110)
CO2 SERPL-SCNC: 26 MMOL/L (ref 23–29)
CREAT SERPL-MCNC: 1.2 MG/DL (ref 0.5–1.4)
EST. GFR  (NO RACE VARIABLE): 45.8 ML/MIN/1.73 M^2
GLUCOSE SERPL-MCNC: 94 MG/DL (ref 70–110)
PHOSPHATE SERPL-MCNC: 2.9 MG/DL (ref 2.7–4.5)
POTASSIUM SERPL-SCNC: 4.6 MMOL/L (ref 3.5–5.1)
SODIUM SERPL-SCNC: 139 MMOL/L (ref 136–145)

## 2024-10-04 PROCEDURE — 36415 COLL VENOUS BLD VENIPUNCTURE: CPT | Performed by: FAMILY MEDICINE

## 2024-10-04 PROCEDURE — 80069 RENAL FUNCTION PANEL: CPT | Performed by: FAMILY MEDICINE

## 2024-12-11 DIAGNOSIS — M15.0 PRIMARY OSTEOARTHRITIS INVOLVING MULTIPLE JOINTS: ICD-10-CM

## 2024-12-11 NOTE — TELEPHONE ENCOUNTER
Care Due:                  Date            Visit Type   Department     Provider  --------------------------------------------------------------------------------                                ESTABLISHED                              PATIENT -    [facility]  Last Visit: 08-      [facility]      MEDICINE       Mandy Renee  Next Visit: None Scheduled  None         None Found                                                            Last  Test          Frequency    Reason                     Performed    Due Date  --------------------------------------------------------------------------------    Lipid Panel.  12 months..  fenofibric...............  02- 02-    TSH.........  12 months..  SYNTHROID................  02- 02-    [employer] Embedded Care Due Messages. Reference number: [id number].   12/11/2024 4:15:10 PM CST

## 2024-12-13 RX ORDER — TRAMADOL HYDROCHLORIDE 50 MG/1
50 TABLET ORAL EVERY 8 HOURS PRN
Qty: 90 TABLET | Refills: 0 | Status: SHIPPED | OUTPATIENT
Start: 2024-12-13

## 2025-01-27 DIAGNOSIS — M25.541 ARTHRALGIA OF BOTH HANDS: ICD-10-CM

## 2025-01-27 DIAGNOSIS — M15.0 PRIMARY OSTEOARTHRITIS INVOLVING MULTIPLE JOINTS: ICD-10-CM

## 2025-01-27 DIAGNOSIS — M25.542 ARTHRALGIA OF BOTH HANDS: ICD-10-CM

## 2025-01-27 DIAGNOSIS — I10 PRIMARY HYPERTENSION: ICD-10-CM

## 2025-01-27 DIAGNOSIS — E78.2 HYPERCHOLESTEROLEMIA WITH HYPERTRIGLYCERIDEMIA: ICD-10-CM

## 2025-01-27 NOTE — TELEPHONE ENCOUNTER
No care due was identified.  Health [facility] Embedded Care Due Messages. Reference number: [id number].   1/27/2025 1:02:10 PM CST

## 2025-01-27 NOTE — TELEPHONE ENCOUNTER
Care Due:                  Date            Visit Type   Department     Provider  --------------------------------------------------------------------------------                                ESTABLISHED                              PATIENT -    [facility] FAMILY  Last Visit: 08-      [employer]      MEDICINE       Mandy Renee  Next Visit: None Scheduled  None         None Found                                                            Last  Test          Frequency    Reason                     Performed    Due Date  --------------------------------------------------------------------------------    CBC.........  12 months..  celecoxib, fenofibric....  03- 03-    [facility] Embedded Care Due Messages. Reference number: [id number].   1/27/2025 1:01:49 PM CST

## 2025-01-28 DIAGNOSIS — I10 PRIMARY HYPERTENSION: ICD-10-CM

## 2025-01-28 RX ORDER — LOSARTAN POTASSIUM 25 MG/1
25 TABLET ORAL DAILY
Qty: 90 TABLET | Refills: 1 | Status: SHIPPED | OUTPATIENT
Start: 2025-01-28

## 2025-01-28 RX ORDER — LEVOTHYROXINE SODIUM 88 UG/1
88 TABLET ORAL
Qty: 90 TABLET | Refills: 0 | Status: SHIPPED | OUTPATIENT
Start: 2025-01-28

## 2025-01-28 RX ORDER — LOSARTAN POTASSIUM 25 MG/1
TABLET ORAL
Qty: 90 TABLET | Refills: 1 | OUTPATIENT
Start: 2025-01-28

## 2025-01-28 RX ORDER — FENOFIBRIC ACID 135 MG/1
135 CAPSULE, DELAYED RELEASE ORAL
Qty: 90 CAPSULE | Refills: 2 | Status: SHIPPED | OUTPATIENT
Start: 2025-01-28

## 2025-01-28 RX ORDER — TRAMADOL HYDROCHLORIDE 50 MG/1
50 TABLET ORAL EVERY 8 HOURS PRN
Qty: 90 TABLET | Refills: 0 | Status: SHIPPED | OUTPATIENT
Start: 2025-01-28

## 2025-01-28 RX ORDER — LEVOTHYROXINE SODIUM 88 UG/1
TABLET ORAL
Qty: 90 TABLET | Refills: 1 | OUTPATIENT
Start: 2025-01-28

## 2025-01-28 RX ORDER — CELECOXIB 200 MG/1
CAPSULE ORAL
Qty: 180 CAPSULE | Refills: 3 | Status: SHIPPED | OUTPATIENT
Start: 2025-01-28

## 2025-01-28 NOTE — TELEPHONE ENCOUNTER
Refill Routing Note   Medication(s) are not appropriate for processing by [facility] for the following reason(s):        Outside of protocol  Required vitals abnormal    [facility] action(s):  Defer  Route  Approve             Appointments  past 12m or future 3m with PCP    Date Provider   Last Visit   8/15/2024 Mandy Renee MD   Next Visit   Visit date not found Mandy Renee MD   ED visits in past 90 days: 0        Note composed:2:05 AM 01/28/2025

## 2025-01-28 NOTE — TELEPHONE ENCOUNTER
Refill Routing Note   Medication(s) are not appropriate for processing by [facility] for the following reason(s):        No active prescription written by provider    [facility] action(s):  Defer               Appointments  past 12m or future 3m with PCP    Date Provider   Last Visit   8/15/2024 Mandy Renee MD   Next Visit   Visit date not found Mandy Renee MD   ED visits in past 90 days: 0        Note composed:2:06 AM 01/28/2025

## 2025-01-28 NOTE — TELEPHONE ENCOUNTER
No care due was identified.  [facility] Embedded Care Due Messages. Reference number: [id number].   1/28/2025 8:02:53 AM CST

## 2025-01-29 NOTE — TELEPHONE ENCOUNTER
Refill Decision Note   Suki Macias  is requesting a refill authorization.  Brief Assessment and Rationale for Refill:  Quick Discontinue     Medication Therapy Plan: Status: Receipt confirmed by pharmacy (1/28/2025  8:24 AM CST)      Comments:     Note composed:6:50 PM 01/28/2025

## 2025-02-19 NOTE — TELEPHONE ENCOUNTER
Called re HS program, Patient would like message sent to Dr. Bartlett re her inability to take Paxlovid. States she did take one dose with a meal, but did vomit approx 2 hours later and continued to retch all night. She will not take again. States she does have a hx of not being able to take a lot of medications due to a sensitive stomach. Triage done- dispo call back from provider tomorrow. Patient states she is actually feeling better. Discussed OTC methods for congestion. Verb understanding.   Reason for Disposition   [1] HIGH RISK patient (e.g., weak immune system, age > 64 years, obesity with BMI 30 or higher, pregnant, chronic lung disease or other chronic medical condition) AND [2] COVID symptoms (e.g., cough, fever)  (Exceptions: Already seen by PCP and no new or worsening symptoms.)    Additional Information   Negative: SEVERE difficulty breathing (e.g., struggling for each breath, speaks in single words)   Negative: Difficult to awaken or acting confused (e.g., disoriented, slurred speech)   Negative: Bluish (or gray) lips or face now   Negative: Shock suspected (e.g., cold/pale/clammy skin, too weak to stand, low BP, rapid pulse)   Negative: Sounds like a life-threatening emergency to the triager   Negative: SEVERE or constant chest pain or pressure  (Exception: Mild central chest pain, present only when coughing.)   Negative: MODERATE difficulty breathing (e.g., speaks in phrases, SOB even at rest, pulse 100-120)   Negative: [1] Headache AND [2] stiff neck (can't touch chin to chest)   Negative: Oxygen level (e.g., pulse oximetry) 90 percent or lower   Negative: Chest pain or pressure  (Exception: MILD central chest pain, present only when coughing.)   Negative: [1] Drinking very little AND [2] dehydration suspected (e.g., no urine > 12 hours, very dry mouth, very lightheaded)   Negative: Patient sounds very sick or weak to the triager   Negative: MILD difficulty breathing (e.g., minimal/no SOB at rest,  SOB with walking, pulse <100)   Negative: Fever > 103 F (39.4 C)   Negative: [1] Fever > 101 F (38.3 C) AND [2] age > 60 years   Negative: [1] Fever > 100.0 F (37.8 C) AND [2] bedridden (e.g., CVA, chronic illness, recovering from surgery)   Negative: Oxygen level (e.g., pulse oximetry) 91 to 94 percent    Protocols used: Coronavirus (COVID-19) Diagnosed or [id number]     If your Intravenous (IV) site becomes red, swollen or painful, call your doctor.

## 2025-02-21 DIAGNOSIS — Z00.00 ENCOUNTER FOR MEDICARE ANNUAL WELLNESS EXAM: ICD-10-CM

## 2025-03-06 ENCOUNTER — TELEPHONE (OUTPATIENT)
Dept: INFUSION THERAPY | Facility: HOSPITAL | Age: 80
End: 2025-03-06

## 2025-03-06 NOTE — TELEPHONE ENCOUNTER
Called pt and left message on voicemail regarding needing Calcium level done prior to Prolia appt on 3/10/25. Call back number left for any questions

## 2025-04-01 ENCOUNTER — TELEPHONE (OUTPATIENT)
Dept: FAMILY MEDICINE | Facility: CLINIC | Age: 80
End: 2025-04-01
Payer: MEDICARE

## 2025-04-01 NOTE — TELEPHONE ENCOUNTER
Spoke with patient, states no longer with [facility] due to having trouble scheduling with Lab and not able to talk with a person.  Changed to Dr. Mayfield with [facility].

## 2025-08-25 RX ORDER — OMEPRAZOLE 40 MG/1
CAPSULE, DELAYED RELEASE ORAL
Qty: 30 CAPSULE | Refills: 11 | Status: SHIPPED | OUTPATIENT
Start: 2025-08-25